# Patient Record
Sex: FEMALE | Race: ASIAN | NOT HISPANIC OR LATINO | URBAN - METROPOLITAN AREA
[De-identification: names, ages, dates, MRNs, and addresses within clinical notes are randomized per-mention and may not be internally consistent; named-entity substitution may affect disease eponyms.]

---

## 2022-03-09 ENCOUNTER — INPATIENT (INPATIENT)
Facility: HOSPITAL | Age: 67
LOS: 8 days | Discharge: HOME | End: 2022-03-18
Attending: INTERNAL MEDICINE | Admitting: INTERNAL MEDICINE
Payer: MEDICAID

## 2022-03-09 VITALS
DIASTOLIC BLOOD PRESSURE: 51 MMHG | RESPIRATION RATE: 16 BRPM | OXYGEN SATURATION: 100 % | HEART RATE: 61 BPM | TEMPERATURE: 98 F | SYSTOLIC BLOOD PRESSURE: 138 MMHG

## 2022-03-09 DIAGNOSIS — N17.9 ACUTE KIDNEY FAILURE, UNSPECIFIED: ICD-10-CM

## 2022-03-09 LAB
ALBUMIN SERPL ELPH-MCNC: 4.1 G/DL — SIGNIFICANT CHANGE UP (ref 3.3–5)
ALP SERPL-CCNC: 132 U/L — HIGH (ref 40–120)
ALT FLD-CCNC: 93 U/L — HIGH (ref 4–33)
ANION GAP SERPL CALC-SCNC: 16 MMOL/L — HIGH (ref 7–14)
APPEARANCE UR: CLEAR — SIGNIFICANT CHANGE UP
APTT BLD: 28 SEC — SIGNIFICANT CHANGE UP (ref 27–36.3)
AST SERPL-CCNC: 52 U/L — HIGH (ref 4–32)
BACTERIA # UR AUTO: ABNORMAL
BASE EXCESS BLDV CALC-SCNC: -2.5 MMOL/L — LOW (ref -2–3)
BASOPHILS # BLD AUTO: 0.01 K/UL — SIGNIFICANT CHANGE UP (ref 0–0.2)
BASOPHILS NFR BLD AUTO: 0.2 % — SIGNIFICANT CHANGE UP (ref 0–2)
BILIRUB SERPL-MCNC: 0.3 MG/DL — SIGNIFICANT CHANGE UP (ref 0.2–1.2)
BILIRUB UR-MCNC: NEGATIVE — SIGNIFICANT CHANGE UP
BLD GP AB SCN SERPL QL: NEGATIVE — SIGNIFICANT CHANGE UP
BLOOD GAS VENOUS COMPREHENSIVE RESULT: SIGNIFICANT CHANGE UP
BUN SERPL-MCNC: 79 MG/DL — HIGH (ref 7–23)
CALCIUM SERPL-MCNC: 8.5 MG/DL — SIGNIFICANT CHANGE UP (ref 8.4–10.5)
CHLORIDE BLDV-SCNC: 105 MMOL/L — SIGNIFICANT CHANGE UP (ref 96–108)
CHLORIDE SERPL-SCNC: 104 MMOL/L — SIGNIFICANT CHANGE UP (ref 98–107)
CO2 BLDV-SCNC: 24.5 MMOL/L — SIGNIFICANT CHANGE UP (ref 22–26)
CO2 SERPL-SCNC: 20 MMOL/L — LOW (ref 22–31)
COLOR SPEC: SIGNIFICANT CHANGE UP
CREAT SERPL-MCNC: 3.82 MG/DL — HIGH (ref 0.5–1.3)
DIFF PNL FLD: NEGATIVE — SIGNIFICANT CHANGE UP
EGFR: 12 ML/MIN/1.73M2 — LOW
EOSINOPHIL # BLD AUTO: 0.05 K/UL — SIGNIFICANT CHANGE UP (ref 0–0.5)
EOSINOPHIL NFR BLD AUTO: 0.8 % — SIGNIFICANT CHANGE UP (ref 0–6)
EPI CELLS # UR: 1 /HPF — SIGNIFICANT CHANGE UP (ref 0–5)
GAS PNL BLDV: 139 MMOL/L — SIGNIFICANT CHANGE UP (ref 136–145)
GLUCOSE BLDC GLUCOMTR-MCNC: 120 MG/DL — HIGH (ref 70–99)
GLUCOSE BLDV-MCNC: 143 MG/DL — HIGH (ref 70–99)
GLUCOSE SERPL-MCNC: 151 MG/DL — HIGH (ref 70–99)
GLUCOSE UR QL: NEGATIVE — SIGNIFICANT CHANGE UP
HCO3 BLDV-SCNC: 23 MMOL/L — SIGNIFICANT CHANGE UP (ref 22–29)
HCT VFR BLD CALC: 28.5 % — LOW (ref 34.5–45)
HCT VFR BLDA CALC: 27 % — LOW (ref 34.5–46.5)
HGB BLD CALC-MCNC: 9 G/DL — LOW (ref 11.5–15.5)
HGB BLD-MCNC: 8.7 G/DL — LOW (ref 11.5–15.5)
HYALINE CASTS # UR AUTO: 0 /LPF — SIGNIFICANT CHANGE UP (ref 0–7)
IANC: 5.33 K/UL — SIGNIFICANT CHANGE UP (ref 1.5–8.5)
IMM GRANULOCYTES NFR BLD AUTO: 1.4 % — SIGNIFICANT CHANGE UP (ref 0–1.5)
INR BLD: 1.21 RATIO — HIGH (ref 0.88–1.16)
KETONES UR-MCNC: NEGATIVE — SIGNIFICANT CHANGE UP
LACTATE BLDV-MCNC: 1 MMOL/L — SIGNIFICANT CHANGE UP (ref 0.5–2)
LEUKOCYTE ESTERASE UR-ACNC: ABNORMAL
LYMPHOCYTES # BLD AUTO: 0.8 K/UL — LOW (ref 1–3.3)
LYMPHOCYTES # BLD AUTO: 12 % — LOW (ref 13–44)
MAGNESIUM SERPL-MCNC: 3.8 MG/DL — HIGH (ref 1.6–2.6)
MCHC RBC-ENTMCNC: 28.4 PG — SIGNIFICANT CHANGE UP (ref 27–34)
MCHC RBC-ENTMCNC: 30.5 GM/DL — LOW (ref 32–36)
MCV RBC AUTO: 93.1 FL — SIGNIFICANT CHANGE UP (ref 80–100)
MONOCYTES # BLD AUTO: 0.37 K/UL — SIGNIFICANT CHANGE UP (ref 0–0.9)
MONOCYTES NFR BLD AUTO: 5.6 % — SIGNIFICANT CHANGE UP (ref 2–14)
NEUTROPHILS # BLD AUTO: 5.33 K/UL — SIGNIFICANT CHANGE UP (ref 1.8–7.4)
NEUTROPHILS NFR BLD AUTO: 80 % — HIGH (ref 43–77)
NITRITE UR-MCNC: NEGATIVE — SIGNIFICANT CHANGE UP
NRBC # BLD: 0 /100 WBCS — SIGNIFICANT CHANGE UP
NRBC # FLD: 0 K/UL — SIGNIFICANT CHANGE UP
PCO2 BLDV: 43 MMHG — HIGH (ref 39–42)
PH BLDV: 7.34 — SIGNIFICANT CHANGE UP (ref 7.32–7.43)
PH UR: 6.5 — SIGNIFICANT CHANGE UP (ref 5–8)
PHOSPHATE SERPL-MCNC: 6.2 MG/DL — HIGH (ref 2.5–4.5)
PLATELET # BLD AUTO: 187 K/UL — SIGNIFICANT CHANGE UP (ref 150–400)
PO2 BLDV: 36 MMHG — SIGNIFICANT CHANGE UP
POTASSIUM BLDV-SCNC: 5 MMOL/L — SIGNIFICANT CHANGE UP (ref 3.5–5.1)
POTASSIUM SERPL-MCNC: 5 MMOL/L — SIGNIFICANT CHANGE UP (ref 3.5–5.3)
POTASSIUM SERPL-SCNC: 5 MMOL/L — SIGNIFICANT CHANGE UP (ref 3.5–5.3)
PROT SERPL-MCNC: 8.1 G/DL — SIGNIFICANT CHANGE UP (ref 6–8.3)
PROT UR-MCNC: ABNORMAL
PROTHROM AB SERPL-ACNC: 14.1 SEC — HIGH (ref 10.5–13.4)
RBC # BLD: 3.06 M/UL — LOW (ref 3.8–5.2)
RBC # FLD: 14.1 % — SIGNIFICANT CHANGE UP (ref 10.3–14.5)
RBC CASTS # UR COMP ASSIST: 1 /HPF — SIGNIFICANT CHANGE UP (ref 0–4)
RH IG SCN BLD-IMP: POSITIVE — SIGNIFICANT CHANGE UP
SAO2 % BLDV: 56.2 % — SIGNIFICANT CHANGE UP
SARS-COV-2 RNA SPEC QL NAA+PROBE: SIGNIFICANT CHANGE UP
SODIUM SERPL-SCNC: 140 MMOL/L — SIGNIFICANT CHANGE UP (ref 135–145)
SP GR SPEC: 1.01 — SIGNIFICANT CHANGE UP (ref 1–1.05)
UROBILINOGEN FLD QL: SIGNIFICANT CHANGE UP
WBC # BLD: 6.65 K/UL — SIGNIFICANT CHANGE UP (ref 3.8–10.5)
WBC # FLD AUTO: 6.65 K/UL — SIGNIFICANT CHANGE UP (ref 3.8–10.5)
WBC UR QL: 5 /HPF — SIGNIFICANT CHANGE UP (ref 0–5)

## 2022-03-09 PROCEDURE — 99285 EMERGENCY DEPT VISIT HI MDM: CPT

## 2022-03-09 PROCEDURE — 76770 US EXAM ABDO BACK WALL COMP: CPT | Mod: 26

## 2022-03-09 PROCEDURE — 71045 X-RAY EXAM CHEST 1 VIEW: CPT | Mod: 26

## 2022-03-09 PROCEDURE — 99223 1ST HOSP IP/OBS HIGH 75: CPT

## 2022-03-09 RX ORDER — GLUCAGON INJECTION, SOLUTION 0.5 MG/.1ML
1 INJECTION, SOLUTION SUBCUTANEOUS ONCE
Refills: 0 | Status: DISCONTINUED | OUTPATIENT
Start: 2022-03-09 | End: 2022-03-18

## 2022-03-09 RX ORDER — DEXTROSE 50 % IN WATER 50 %
25 SYRINGE (ML) INTRAVENOUS ONCE
Refills: 0 | Status: DISCONTINUED | OUTPATIENT
Start: 2022-03-09 | End: 2022-03-18

## 2022-03-09 RX ORDER — INSULIN LISPRO 100/ML
VIAL (ML) SUBCUTANEOUS
Refills: 0 | Status: DISCONTINUED | OUTPATIENT
Start: 2022-03-09 | End: 2022-03-18

## 2022-03-09 RX ORDER — ACETAMINOPHEN 500 MG
650 TABLET ORAL EVERY 6 HOURS
Refills: 0 | Status: DISCONTINUED | OUTPATIENT
Start: 2022-03-09 | End: 2022-03-18

## 2022-03-09 RX ORDER — SODIUM CHLORIDE 9 MG/ML
1000 INJECTION, SOLUTION INTRAVENOUS
Refills: 0 | Status: DISCONTINUED | OUTPATIENT
Start: 2022-03-09 | End: 2022-03-18

## 2022-03-09 RX ORDER — SODIUM BICARBONATE 1 MEQ/ML
650 SYRINGE (ML) INTRAVENOUS THREE TIMES A DAY
Refills: 0 | Status: DISCONTINUED | OUTPATIENT
Start: 2022-03-09 | End: 2022-03-18

## 2022-03-09 RX ORDER — FOLIC ACID 0.8 MG
1 TABLET ORAL DAILY
Refills: 0 | Status: DISCONTINUED | OUTPATIENT
Start: 2022-03-09 | End: 2022-03-18

## 2022-03-09 RX ORDER — HEPARIN SODIUM 5000 [USP'U]/ML
5000 INJECTION INTRAVENOUS; SUBCUTANEOUS THREE TIMES A DAY
Refills: 0 | Status: DISCONTINUED | OUTPATIENT
Start: 2022-03-09 | End: 2022-03-17

## 2022-03-09 RX ORDER — ASPIRIN/CALCIUM CARB/MAGNESIUM 324 MG
81 TABLET ORAL DAILY
Refills: 0 | Status: DISCONTINUED | OUTPATIENT
Start: 2022-03-09 | End: 2022-03-18

## 2022-03-09 RX ORDER — HYDRALAZINE HCL 50 MG
10 TABLET ORAL THREE TIMES A DAY
Refills: 0 | Status: DISCONTINUED | OUTPATIENT
Start: 2022-03-09 | End: 2022-03-18

## 2022-03-09 RX ORDER — INSULIN GLARGINE 100 [IU]/ML
10 INJECTION, SOLUTION SUBCUTANEOUS AT BEDTIME
Refills: 0 | Status: DISCONTINUED | OUTPATIENT
Start: 2022-03-09 | End: 2022-03-18

## 2022-03-09 RX ORDER — DEXTROSE 50 % IN WATER 50 %
12.5 SYRINGE (ML) INTRAVENOUS ONCE
Refills: 0 | Status: DISCONTINUED | OUTPATIENT
Start: 2022-03-09 | End: 2022-03-18

## 2022-03-09 RX ORDER — INSULIN LISPRO 100/ML
VIAL (ML) SUBCUTANEOUS AT BEDTIME
Refills: 0 | Status: DISCONTINUED | OUTPATIENT
Start: 2022-03-09 | End: 2022-03-18

## 2022-03-09 RX ORDER — FUROSEMIDE 40 MG
40 TABLET ORAL DAILY
Refills: 0 | Status: DISCONTINUED | OUTPATIENT
Start: 2022-03-09 | End: 2022-03-10

## 2022-03-09 RX ORDER — DEXTROSE 50 % IN WATER 50 %
15 SYRINGE (ML) INTRAVENOUS ONCE
Refills: 0 | Status: DISCONTINUED | OUTPATIENT
Start: 2022-03-09 | End: 2022-03-18

## 2022-03-09 RX ORDER — CARVEDILOL PHOSPHATE 80 MG/1
6.25 CAPSULE, EXTENDED RELEASE ORAL EVERY 12 HOURS
Refills: 0 | Status: DISCONTINUED | OUTPATIENT
Start: 2022-03-09 | End: 2022-03-18

## 2022-03-09 RX ORDER — ATORVASTATIN CALCIUM 80 MG/1
20 TABLET, FILM COATED ORAL AT BEDTIME
Refills: 0 | Status: DISCONTINUED | OUTPATIENT
Start: 2022-03-09 | End: 2022-03-18

## 2022-03-09 RX ORDER — PREGABALIN 225 MG/1
1000 CAPSULE ORAL DAILY
Refills: 0 | Status: DISCONTINUED | OUTPATIENT
Start: 2022-03-09 | End: 2022-03-18

## 2022-03-09 RX ORDER — GABAPENTIN 400 MG/1
300 CAPSULE ORAL DAILY
Refills: 0 | Status: DISCONTINUED | OUTPATIENT
Start: 2022-03-09 | End: 2022-03-18

## 2022-03-09 RX ORDER — LEVOTHYROXINE SODIUM 125 MCG
75 TABLET ORAL DAILY
Refills: 0 | Status: DISCONTINUED | OUTPATIENT
Start: 2022-03-09 | End: 2022-03-18

## 2022-03-09 RX ORDER — NIFEDIPINE 30 MG
90 TABLET, EXTENDED RELEASE 24 HR ORAL DAILY
Refills: 0 | Status: DISCONTINUED | OUTPATIENT
Start: 2022-03-09 | End: 2022-03-18

## 2022-03-09 NOTE — H&P ADULT - NSHPPHYSICALEXAM_GEN_ALL_CORE
Vital Signs Last 24 Hrs  T(C): 36.7 (09 Mar 2022 22:14), Max: 36.7 (09 Mar 2022 16:27)  T(F): 98.1 (09 Mar 2022 22:14), Max: 98.1 (09 Mar 2022 22:14)  HR: 58 (09 Mar 2022 22:14) (57 - 61)  BP: 142/61 (09 Mar 2022 22:14) (138/51 - 156/62)  BP(mean): --  RR: 18 (09 Mar 2022 22:14) (16 - 18)  SpO2: 97% (09 Mar 2022 22:14) (97% - 100%)    PHYSICAL EXAM:  GENERAL: No Acute Distress  EYES: conjunctiva and sclera clear  ENMT: Moist mucous membranes   NECK: Supple  PULMONARY: Clear to auscultation bilaterally  CARDIAC: Regular rate and rhythm; No murmurs, rubs, or gallops  GASTROINTESTINAL: Abdomen soft, Nontender, Nondistended; Bowel sounds normal  EXTREMITIES:   No clubbing, cyanosis, or pedal edema  PSYCH: Normal Affect, Normal Behavior  NEUROLOGY:   - Mental status A&O x 3,  SKIN: No rashes or lesions  MUSCULOSKELETAL: No joint swelling

## 2022-03-09 NOTE — ED ADULT NURSE NOTE - NSIMPLEMENTINTERV_GEN_ALL_ED
Implemented All Fall Risk Interventions:  [place] to call system. Call bell, personal items and telephone within reach. Instruct patient to call for assistance. Room bathroom lighting operational. Non-slip footwear when patient is off stretcher. Physically safe environment: no spills, clutter or unnecessary equipment. Stretcher in lowest position, wheels locked, appropriate side rails in place. Provide visual cue, wrist band, yellow gown, etc. Monitor gait and stability. Monitor for mental status changes and reorient to person, place, and time. Review medications for side effects contributing to fall risk. Reinforce activity limits and safety measures with patient and family.

## 2022-03-09 NOTE — H&P ADULT - PROBLEM SELECTOR PLAN 1
hyperkalemia resolved  - trend creatinine  - reviewed renal US  - f/u nephrology recs  - will check urine protein/cr ratio to eval for nephrotic syndrome as cause of facial swelling  - continue bicarb  - hold lasix for now

## 2022-03-09 NOTE — ED ADULT NURSE NOTE - OBJECTIVE STATEMENT
Pt A+OX3, Faroese speaking, requesting daughter to translate, told by MD today to go to the ER for elevated potassium level that was drawn this past Friday.  C/O intermittent CP and SOB.  Also sent for NISH.  EKG done.  CM placed.  Labs obtained and sent as ordered.  #18g SL R arm placed.  PA at bedside.

## 2022-03-09 NOTE — H&P ADULT - PROBLEM SELECTOR PLAN 2
Burning, non-exertional.  Appears c/w GERD, low suspicion for angina  - will give trial of Protonix, Maalox

## 2022-03-09 NOTE — ED ADULT TRIAGE NOTE - CHIEF COMPLAINT QUOTE
pt primarily Khmer speaking (daughter translating) sent in by PCP for elevated potassium and NISH. Pt endorsing SOB and epigastric pain. Denies fever, urinary symptoms, CP. PMH HTN, DM. pt primarily Telugu speaking (daughter translating) sent in by PCP for elevated potassium and NISH. Pt endorsing SOB and epigastric pain. Denies fever, urinary symptoms, CP. PMH HTN, DM. fs glu 147

## 2022-03-09 NOTE — ED PROVIDER NOTE - CLINICAL SUMMARY MEDICAL DECISION MAKING FREE TEXT BOX
65 y/o female sent from nephro for worsening CKD and hyperkalemia  -concern for worsening CKD/NISH and hyperkalemia  -cbc cmp vbg pre-ops  -cxr  -[facility]

## 2022-03-09 NOTE — ED PROVIDER NOTE - OBJECTIVE STATEMENT
65 y/o female hx HLD HTN CKD presents to ER sent by PMD/nephro for worsening CKD and hyperkalemia. Pt. saw her nephro - Dr. Mcghee last week for check up - was called today and told that she has elevated cr. and K+ and told to come to ER for futher evaluation. Pt. c/o some mild chest pressure and sob x last few weeks and some mild weakness as well. Pt. dneies fever chills abdominal pain numbness tingling loc. 67 y/o female hx HLD HTN CKD presents to ER sent by PMD/nephro for worsening CKD and hyperkalemia. Pt. saw her nephro - Dr. Mcghee last week for check up - was called today and told that she has elevated cr. and K+ and told to come to ER for further evaluation. Pt. c/o some mild chest pressure and sob x last few weeks and some mild weakness as well. Pt. dneies fever chills abdominal pain numbness tingling loc.

## 2022-03-09 NOTE — ED PROVIDER NOTE - PROGRESS NOTE DETAILS
Patient signed out to me - no hydro on US. US comments on gallbladder wall edema. Pt denies any abdominal pain, nausea. No recent f/c. On exam no ruq tenderness, Damico negative. No need for further imaging at this time. Wall edema not clinically significant. [facility] hospitalist paged for admission. eBbeto Eldridge, AYESHA PGY3

## 2022-03-09 NOTE — H&P ADULT - NSHPLABSRESULTS_GEN_ALL_CORE
140  |  104  |  79<H>  ----------------------------<  151<H>  5.0   |  20<L>  |  3.82<H>    Ca    8.5      09 Mar 2022 17:45  Phos  6.2       Mg     3.80         TPro  8.1  /  Alb  4.1  /  TBili  0.3  /  DBili  x   /  AST  52<H>  /  ALT  93<H>  /  AlkPhos  132<H>                              8.7    6.65  )-----------( 187      ( 09 Mar 2022 17:45 )             28.5               PT/INR - ( 09 Mar 2022 17:45 )   PT: 14.1 sec;   INR: 1.21 ratio         PTT - ( 09 Mar 2022 17:45 )  PTT:28.0 sec    Urinalysis Basic - ( 09 Mar 2022 18:16 )    Color: Light Yellow / Appearance: Clear / S.010 / pH: x  Gluc: x / Ketone: Negative  / Bili: Negative / Urobili: <2 mg/dL   Blood: x / Protein: 100 mg/dL / Nitrite: Negative   Leuk Esterase: Small / RBC: 1 /HPF / WBC 5 /HPF   Sq Epi: x / Non Sq Epi: 1 /HPF / Bacteria: Moderate    < from: US Kidney and Bladder (22 @ 19:03) >    No hydronephrosis.    Gallbladder wall edema.    < end of copied text >    EKG tracing reviewed: AMANDA

## 2022-03-09 NOTE — H&P ADULT - ASSESSMENT
67 y/o F with HTN, HLD, DM, CKD, hypothyroidism presents from nephrology office for worsening creatinine and hyperkalemia on labs.

## 2022-03-09 NOTE — ED PROVIDER NOTE - ATTENDING CONTRIBUTION TO CARE
Agree with above- pt with worsening creatinine, sent in by nephro for eval and admission. Pt overall well appearing, plan for labs and renal US.

## 2022-03-09 NOTE — H&P ADULT - HISTORY OF PRESENT ILLNESS
67 y/o F with HTN, HLD, DM, CKD, hypothyroidism presents from nephrology office for worsening creatinine and hyperkalemia on labs.  Pt and daughter report that pt has had facial swelling, and dyspnea for the past few days.  Pt also has had mild burning midline chest pain for a few days that is non exertional.   She also reports some upper back pain that is not related to chest pain.  Pt reports no palpitations, leg swelling, fever, chills cough or other new symptoms.

## 2022-03-09 NOTE — H&P ADULT - NSHPREVIEWOFSYSTEMS_GEN_ALL_CORE
Review of Systems:   CONSTITUTIONAL: No fever or chills  EYES: No eye pain, visual disturbances, or discharge  ENMT:  No difficulty hearing, no throat pain  NECK: No pain or stiffness  RESPIRATORY: No cough, + shortness of breath  CARDIOVASCULAR: mild chest pain, no palpitations, dizziness, or leg swelling  GASTROINTESTINAL: No abdominal pain, nausea, vomiting or diarrhea  GENITOURINARY: No dysuria, or hematuria  NEUROLOGICAL: No headaches, weakness, or numbness  SKIN: No rashes, or lesions   LYMPH NODES: No enlarged glands  ENDOCRINE: No heat or cold intolerance  MUSCULOSKELETAL: No joint pain or swelling  PSYCHIATRIC: No depression or anxiety  HEME/LYMPH: No easy bruising, or bleeding  ALLERGY AND IMMUNOLOGIC: No hives or eczema

## 2022-03-09 NOTE — ED ADULT NURSE NOTE - CHIEF COMPLAINT QUOTE
pt primarily Icelandic speaking (daughter translating) sent in by PCP for elevated potassium and NISH. Pt endorsing SOB and epigastric pain. Denies fever, urinary symptoms, CP. PMH HTN, DM. fs glu 147

## 2022-03-10 DIAGNOSIS — E11.9 TYPE 2 DIABETES MELLITUS WITHOUT COMPLICATIONS: ICD-10-CM

## 2022-03-10 DIAGNOSIS — E03.9 HYPOTHYROIDISM, UNSPECIFIED: ICD-10-CM

## 2022-03-10 DIAGNOSIS — N17.9 ACUTE KIDNEY FAILURE, UNSPECIFIED: ICD-10-CM

## 2022-03-10 DIAGNOSIS — I10 ESSENTIAL (PRIMARY) HYPERTENSION: ICD-10-CM

## 2022-03-10 DIAGNOSIS — R07.9 CHEST PAIN, UNSPECIFIED: ICD-10-CM

## 2022-03-10 LAB
ANION GAP SERPL CALC-SCNC: 15 MMOL/L — HIGH (ref 7–14)
BUN SERPL-MCNC: 76 MG/DL — HIGH (ref 7–23)
CALCIUM SERPL-MCNC: 7.9 MG/DL — LOW (ref 8.4–10.5)
CHLORIDE SERPL-SCNC: 103 MMOL/L — SIGNIFICANT CHANGE UP (ref 98–107)
CO2 SERPL-SCNC: 20 MMOL/L — LOW (ref 22–31)
CREAT SERPL-MCNC: 3.76 MG/DL — HIGH (ref 0.5–1.3)
EGFR: 13 ML/MIN/1.73M2 — LOW
GLUCOSE BLDC GLUCOMTR-MCNC: 102 MG/DL — HIGH (ref 70–99)
GLUCOSE BLDC GLUCOMTR-MCNC: 139 MG/DL — HIGH (ref 70–99)
GLUCOSE BLDC GLUCOMTR-MCNC: 148 MG/DL — HIGH (ref 70–99)
GLUCOSE BLDC GLUCOMTR-MCNC: 163 MG/DL — HIGH (ref 70–99)
GLUCOSE BLDC GLUCOMTR-MCNC: 179 MG/DL — HIGH (ref 70–99)
GLUCOSE BLDC GLUCOMTR-MCNC: 79 MG/DL — SIGNIFICANT CHANGE UP (ref 70–99)
GLUCOSE SERPL-MCNC: 160 MG/DL — HIGH (ref 70–99)
MAGNESIUM SERPL-MCNC: 3.5 MG/DL — HIGH (ref 1.6–2.6)
PHOSPHATE SERPL-MCNC: 6.3 MG/DL — HIGH (ref 2.5–4.5)
POTASSIUM SERPL-MCNC: 4.7 MMOL/L — SIGNIFICANT CHANGE UP (ref 3.5–5.3)
POTASSIUM SERPL-SCNC: 4.7 MMOL/L — SIGNIFICANT CHANGE UP (ref 3.5–5.3)
SODIUM SERPL-SCNC: 138 MMOL/L — SIGNIFICANT CHANGE UP (ref 135–145)

## 2022-03-10 RX ORDER — CALCIUM ACETATE 667 MG
667 TABLET ORAL
Refills: 0 | Status: DISCONTINUED | OUTPATIENT
Start: 2022-03-10 | End: 2022-03-15

## 2022-03-10 RX ORDER — PANTOPRAZOLE SODIUM 20 MG/1
40 TABLET, DELAYED RELEASE ORAL
Refills: 0 | Status: DISCONTINUED | OUTPATIENT
Start: 2022-03-10 | End: 2022-03-18

## 2022-03-10 RX ORDER — INSULIN GLARGINE 100 [IU]/ML
10 INJECTION, SOLUTION SUBCUTANEOUS ONCE
Refills: 0 | Status: COMPLETED | OUTPATIENT
Start: 2022-03-10 | End: 2022-03-10

## 2022-03-10 RX ADMIN — ATORVASTATIN CALCIUM 20 MILLIGRAM(S): 80 TABLET, FILM COATED ORAL at 21:29

## 2022-03-10 RX ADMIN — Medication 1 MILLIGRAM(S): at 11:51

## 2022-03-10 RX ADMIN — Medication 667 MILLIGRAM(S): at 16:53

## 2022-03-10 RX ADMIN — Medication 1: at 17:49

## 2022-03-10 RX ADMIN — Medication 81 MILLIGRAM(S): at 11:51

## 2022-03-10 RX ADMIN — GABAPENTIN 300 MILLIGRAM(S): 400 CAPSULE ORAL at 11:51

## 2022-03-10 RX ADMIN — HEPARIN SODIUM 5000 UNIT(S): 5000 INJECTION INTRAVENOUS; SUBCUTANEOUS at 05:25

## 2022-03-10 RX ADMIN — PREGABALIN 1000 MICROGRAM(S): 225 CAPSULE ORAL at 11:52

## 2022-03-10 RX ADMIN — Medication 650 MILLIGRAM(S): at 16:17

## 2022-03-10 RX ADMIN — HEPARIN SODIUM 5000 UNIT(S): 5000 INJECTION INTRAVENOUS; SUBCUTANEOUS at 11:52

## 2022-03-10 RX ADMIN — Medication 90 MILLIGRAM(S): at 08:50

## 2022-03-10 RX ADMIN — HEPARIN SODIUM 5000 UNIT(S): 5000 INJECTION INTRAVENOUS; SUBCUTANEOUS at 21:29

## 2022-03-10 RX ADMIN — INSULIN GLARGINE 10 UNIT(S): 100 INJECTION, SOLUTION SUBCUTANEOUS at 22:37

## 2022-03-10 RX ADMIN — Medication 75 MICROGRAM(S): at 05:22

## 2022-03-10 RX ADMIN — Medication 650 MILLIGRAM(S): at 05:22

## 2022-03-10 RX ADMIN — Medication 10 MILLIGRAM(S): at 21:29

## 2022-03-10 RX ADMIN — Medication 650 MILLIGRAM(S): at 21:29

## 2022-03-10 RX ADMIN — Medication 10 MILLIGRAM(S): at 11:52

## 2022-03-10 RX ADMIN — Medication 10 MILLIGRAM(S): at 05:22

## 2022-03-10 RX ADMIN — PANTOPRAZOLE SODIUM 40 MILLIGRAM(S): 20 TABLET, DELAYED RELEASE ORAL at 05:22

## 2022-03-10 RX ADMIN — INSULIN GLARGINE 10 UNIT(S): 100 INJECTION, SOLUTION SUBCUTANEOUS at 04:09

## 2022-03-10 NOTE — PATIENT PROFILE ADULT - FALL HARM RISK - RISK INTERVENTIONS

## 2022-03-10 NOTE — PROGRESS NOTE ADULT - SUBJECTIVE AND OBJECTIVE BOX
Sitting on chair  Breathing comfortably    Vital Signs Last 24 Hrs  T(C): 36.7 (10 Mar 2022 08:45), Max: 36.7 (09 Mar 2022 16:27)  T(F): 98 (10 Mar 2022 08:45), Max: 98.1 (09 Mar 2022 22:14)  HR: 62 (10 Mar 2022 08:45) (57 - 62)  BP: 138/71 (10 Mar 2022 08:45) (131/65 - 156/62)  BP(mean): --  RR: 18 (10 Mar 2022 08:45) (16 - 18)  SpO2: 97% (10 Mar 2022 08:45) (96% - 100%)    I&O's Summary    Gen: NAD  Head: NCAT, EOMI  Lungs: rales rt base > lt base; no wheezes  Heart: RRR, nl S1/S2, no murmurs  Abd: soft, NTND, NABS  Neuro: A+O x 3  Exts: no LE edema b/l    LABS:                        8.7    6.65  )-----------( 187      ( 09 Mar 2022 17:45 )             28.5     03-09    140  |  104  |  79<H>  ----------------------------<  151<H>  5.0   |  20<L>  |  3.82<H>    Ca    8.5      09 Mar 2022 17:45  Phos  6.2     03-09  Mg     3.80     03-09    TPro  8.1  /  Alb  4.1  /  TBili  0.3  /  DBili  x   /  AST  52<H>  /  ALT  93<H>  /  AlkPhos  132<H>  03-09    PT/INR - ( 09 Mar 2022 17:45 )   PT: 14.1 sec;   INR: 1.21 ratio         PTT - ( 09 Mar 2022 17:45 )  PTT:28.0 sec  CAPILLARY BLOOD GLUCOSE      POCT Blood Glucose.: 102 mg/dL (10 Mar 2022 08:28)  POCT Blood Glucose.: 148 mg/dL (10 Mar 2022 03:31)  POCT Blood Glucose.: 79 mg/dL (10 Mar 2022 02:43)  POCT Blood Glucose.: 120 mg/dL (09 Mar 2022 20:57)  POCT Blood Glucose.: 147 mg/dL (09 Mar 2022 16:53)        Urinalysis Basic - ( 09 Mar 2022 18:16 )    Color: Light Yellow / Appearance: Clear / S.010 / pH: x  Gluc: x / Ketone: Negative  / Bili: Negative / Urobili: <2 mg/dL   Blood: x / Protein: 100 mg/dL / Nitrite: Negative   Leuk Esterase: Small / RBC: 1 /HPF / WBC 5 /HPF   Sq Epi: x / Non Sq Epi: 1 /HPF / Bacteria: Moderate        RADIOLOGY & ADDITIONAL TESTS:    Imaging Personally Reviewed:  [x] YES  [ ] NO    Will obtain old records:  [ ] YES  [x] NO

## 2022-03-10 NOTE — CONSULT NOTE ADULT - SUBJECTIVE AND OBJECTIVE BOX
Gary Morrison MD  Interventional Cardiology / Endovascular Specialist  [place] Office : [address] [place] [address] [place] [address]  Tel:   [place] Office : [address] [facility] [place] [address]  Tel: [phone]      HISTORY OF PRESENTING ILLNESS:   ID # [id number]  65 y/o F with HTN, HLD, DM, CKD, hypothyroidism presents from nephrology office for worsening creatinine and hyperkalemia on labs.  Pt and daughter report that pt has had facial swelling, and dyspnea for the past few days.  Pt also has had mild burning midline chest pain for a few days that is non exertional. She also reports some upper back pain that is not related to chest pain.  Pt reports no palpitations, leg swelling, fever, chills cough or other new symptoms. Currently no CP. SOB improving.   	  MEDICATIONS:  aspirin enteric coated 81 milliGRAM(s) Oral daily  carvedilol 6.25 milliGRAM(s) Oral every 12 hours  heparin   Injectable 5000 Unit(s) SubCutaneous three times a day  hydrALAZINE 10 milliGRAM(s) Oral three times a day  NIFEdipine XL 90 milliGRAM(s) Oral daily        acetaminophen     Tablet .. 650 milliGRAM(s) Oral every 6 hours PRN  gabapentin 300 milliGRAM(s) Oral daily    pantoprazole    Tablet 40 milliGRAM(s) Oral before breakfast    atorvastatin 20 milliGRAM(s) Oral at bedtime  dextrose 40% Gel 15 Gram(s) Oral once  dextrose 50% Injectable 25 Gram(s) IV Push once  dextrose 50% Injectable 12.5 Gram(s) IV Push once  dextrose 50% Injectable 25 Gram(s) IV Push once  glucagon  Injectable 1 milliGRAM(s) IntraMuscular once  insulin glargine Injectable (LANTUS) 10 Unit(s) SubCutaneous at bedtime  insulin lispro (ADMELOG) corrective regimen sliding scale   SubCutaneous three times a day before meals  insulin lispro (ADMELOG) corrective regimen sliding scale   SubCutaneous at bedtime  levothyroxine 75 MICROGram(s) Oral daily    calcium acetate 667 milliGRAM(s) Oral three times a day with meals  cyanocobalamin 1000 MICROGram(s) Oral daily  dextrose 5%. 1000 milliLiter(s) IV Continuous <Continuous>  dextrose 5%. 1000 milliLiter(s) IV Continuous <Continuous>  folic acid 1 milliGRAM(s) Oral daily  sodium bicarbonate 650 milliGRAM(s) Oral three times a day      PAST MEDICAL/SURGICAL HISTORY  PAST MEDICAL & SURGICAL HISTORY:  HTN (hypertension)    HLD (hyperlipidemia)        SOCIAL HISTORY: Substance Use (street drugs): ( x ) never used  (  ) other:    FAMILY HISTORY:  No pertinent family history in first degree relatives        REVIEW OF SYSTEMS:  CONSTITUTIONAL: No fever, weight loss, or fatigue  EYES: No eye pain, visual disturbances, or discharge  ENMT:  No difficulty hearing, tinnitus, vertigo; No sinus or throat pain  BREASTS: No pain, masses, or nipple discharge  GASTROINTESTINAL: No abdominal or epigastric pain. No nausea, vomiting, or hematemesis; No diarrhea or constipation. No melena or hematochezia.  GENITOURINARY: No dysuria, frequency, hematuria, or incontinence  NEUROLOGICAL: No headaches, memory loss, loss of strength, numbness, or tremors  ENDOCRINE: No heat or cold intolerance; No hair loss  MUSCULOSKELETAL: No joint pain or swelling; No muscle, back, or extremity pain  PSYCHIATRIC: No depression, anxiety, mood swings, or difficulty sleeping  HEME/LYMPH: No easy bruising, or bleeding gums  All others negative    PHYSICAL EXAM:  T(C): 36.7 (03-10-22 @ 11:50), Max: 36.7 (03-09-22 @ 16:27)  HR: 70 (03-10-22 @ 11:50) (57 - 70)  BP: 128/65 (03-10-22 @ 11:50) (128/65 - 156/62)  RR: 18 (03-10-22 @ 11:50) (16 - 18)  SpO2: 97% (03-10-22 @ 11:50) (96% - 100%)  Wt(kg): --  I&O's Summary        GENERAL: NAD  EYES: EOMI, PERRLA, conjunctiva and sclera clear  ENMT: No tonsillar erythema, exudates, or enlargement  Cardiovascular: Normal S1 S2, No JVD, No murmurs, No edema  Respiratory: Lungs clear to auscultation	  Gastrointestinal:  Soft, Non-tender, + BS	  Extremities: mild LE edema                                       8.7    6.65  )-----------( 187      ( 09 Mar 2022 17:45 )             28.5     03-09    140  |  104  |  79<H>  ----------------------------<  151<H>  5.0   |  20<L>  |  3.82<H>    Ca    8.5      09 Mar 2022 17:45  Phos  6.2     03-09  Mg     3.80     03-09    TPro  8.1  /  Alb  4.1  /  TBili  0.3  /  DBili  x   /  AST  52<H>  /  ALT  93<H>  /  AlkPhos  132<H>  03-09    proBNP:   Lipid Profile:   HgA1c:   TSH:     Consultant(s) Notes Reviewed:  [x ] YES  [ ] NO    Care Discussed with Consultants/Other Providers [ x] YES  [ ] NO    Imaging Personally Reviewed independently:  [x] YES  [ ] NO    All labs, radiologic studies, vitals, orders and medications list reviewed. Patient is seen and examined at bedside. Case discussed with medical team.              
[facility]   MD ANA KAPADIA MD RUORU WONG, PA    TEL:  FROM 9 AM to 5 PM--OFFICE: [phone]    FROM 5 PM- 9 AM PLEASE CALL ANSWERING SERVICE AT [phone]    -- INITIAL RENAL CONSULT NOTE --- Date Of service 03-10-22 @ 12:38  --------------------------------------------------------------------------------  HPI:    65 y/o F with HTN, HLD, DM, CKD, hypothyroidism presents from nephrology office for worsening creatinine and hyperkalemia on labs.  Pt and daughter report that pt has had facial swelling, and dyspnea for the past few days.  Pt also has had mild burning midline chest pain for a few days that is non exertional.          PAST HISTORY  --------------------------------------------------------------------------------  PAST MEDICAL & SURGICAL HISTORY:  HTN (hypertension)    HLD (hyperlipidemia)      FAMILY HISTORY:  No pertinent family history in first degree relatives      PAST SOCIAL HISTORY:    ALLERGIES & MEDICATIONS  --------------------------------------------------------------------------------  Allergies    No Known Allergies    Intolerances      Standing Inpatient Medications  aspirin enteric coated 81 milliGRAM(s) Oral daily  atorvastatin 20 milliGRAM(s) Oral at bedtime  carvedilol 6.25 milliGRAM(s) Oral every 12 hours  cyanocobalamin 1000 MICROGram(s) Oral daily  dextrose 40% Gel 15 Gram(s) Oral once  dextrose 5%. 1000 milliLiter(s) IV Continuous <Continuous>  dextrose 5%. 1000 milliLiter(s) IV Continuous <Continuous>  dextrose 50% Injectable 25 Gram(s) IV Push once  dextrose 50% Injectable 12.5 Gram(s) IV Push once  dextrose 50% Injectable 25 Gram(s) IV Push once  folic acid 1 milliGRAM(s) Oral daily  gabapentin 300 milliGRAM(s) Oral daily  glucagon  Injectable 1 milliGRAM(s) IntraMuscular once  heparin   Injectable 5000 Unit(s) SubCutaneous three times a day  hydrALAZINE 10 milliGRAM(s) Oral three times a day  insulin glargine Injectable (LANTUS) 10 Unit(s) SubCutaneous at bedtime  insulin lispro (ADMELOG) corrective regimen sliding scale   SubCutaneous three times a day before meals  insulin lispro (ADMELOG) corrective regimen sliding scale   SubCutaneous at bedtime  levothyroxine 75 MICROGram(s) Oral daily  NIFEdipine XL 90 milliGRAM(s) Oral daily  pantoprazole    Tablet 40 milliGRAM(s) Oral before breakfast  sodium bicarbonate 650 milliGRAM(s) Oral three times a day    PRN Inpatient Medications  acetaminophen     Tablet .. 650 milliGRAM(s) Oral every 6 hours PRN      REVIEW OF SYSTEMS  --------------------------------------------------------------------------------  Gen: No fevers/chills  Skin: No rashes  Head/Eyes/Ears: Normal hearing,  Normal vision   Respiratory: + dyspnea, cough  CV: No chest pain  GI: No abdominal pain, diarrhea, constipation, nausea, vomiting  : No dysuria, hematuria  MSK: + edema  Heme: No easy bruising or bleeding  Psych: No significant depression    All other systems were reviewed and are negative, except as noted.    VITALS/PHYSICAL EXAM  --------------------------------------------------------------------------------  T(C): 36.7 (03-10-22 @ 11:50), Max: 36.7 (03-09-22 @ 16:27)  HR: 70 (03-10-22 @ 11:50) (57 - 70)  BP: 128/65 (03-10-22 @ 11:50) (128/65 - 156/62)  RR: 18 (03-10-22 @ 11:50) (16 - 18)  SpO2: 97% (03-10-22 @ 11:50) (96% - 100%)  Wt(kg): --        Physical Exam:  	Gen: NAD  	HEENT: MMM  	Pulm: Crackles  B/L  	CV: S1S2  	Abd: Soft, +BS   	Ext: + LE edema B/L  	Neuro: Awake, alert  	Skin: Warm and dry  	Vascular access: No HD catheter           : no  sharath  LABS/STUDIES  --------------------------------------------------------------------------------              8.7    6.65  >-----------<  187      [03-09-22 @ 17:45]              28.5     140  |  104  |  79  ----------------------------<  151      [03-09-22 @ 17:45]  5.0   |  20  |  3.82        Ca     8.5     [03-09-22 @ 17:45]      Mg     3.80     [03-09-22 @ 17:45]      Phos  6.2     [03-09-22 @ 17:45]    TPro  8.1  /  Alb  4.1  /  TBili  0.3  /  DBili  x   /  AST  52  /  ALT  93  /  AlkPhos  132  [03-09-22 @ 17:45]    PT/INR: PT 14.1 , INR 1.21       [03-09-22 @ 17:45]  PTT: 28.0       [03-09-22 @ 17:45]      Creatinine Trend:  SCr 3.82 [03-09 @ 17:45]    Urinalysis - [03-09-22 @ 18:16]      Color Light Yellow / Appearance Clear / SG 1.010 / pH 6.5      Gluc Negative / Ketone Negative  / Bili Negative / Urobili <2 mg/dL       Blood Negative / Protein 100 mg/dL / Leuk Est Small / Nitrite Negative      RBC 1 / WBC 5 / Hyaline 0 / Gran  / Sq Epi  / Non Sq Epi 1 / Bacteria Moderate

## 2022-03-10 NOTE — CONSULT NOTE ADULT - ASSESSMENT
65 y/o F with HTN, HLD, DM, CKD, hypothyroidism presents from nephrology office for worsening creatinine and hyperkalemia on labs.  Pt and daughter report that pt has had facial swelling, and dyspnea for the past few days.  Pt also has had mild burning midline chest pain for a few days that is non exertional.      NISH on CKD  NISH possibly cardio renal   Diuretics per cardiology  MOnitor BMP   CHeck URien lytes   Avoid nephrotoxics, NSIADS RCA      FLuid overload  likely CHF  Cardiology evaluation   MOnitor daily weights     Acidosis  on oral bicarb  monitor serum co2    Hyperphosphatemia  start binders  low PO4 diet  
65 y/o F with HTN, HLD, DM, CKD, hypothyroidism presents from nephrology office for worsening creatinine and hyperkalemia on labs    EKG: NSR no acute changes    1. SOB  -CXR with hazy RLL opacity  -improving  -likely 2/2 CKD. denies CP  -EKG with no acute changes  -obtain echo    2. CKD  -creat worsening   -hyperkalemia on admission now resolved   -f/u renal recs    3. HTN  -controlled  -c/w coreg, hydralazine and nifedipine  -continue to monitor BP    4. DVT prophylaxis  -hep subq

## 2022-03-11 LAB
A1C WITH ESTIMATED AVERAGE GLUCOSE RESULT: 5.7 % — HIGH (ref 4–5.6)
ANION GAP SERPL CALC-SCNC: 13 MMOL/L — SIGNIFICANT CHANGE UP (ref 7–14)
BUN SERPL-MCNC: 71 MG/DL — HIGH (ref 7–23)
CALCIUM SERPL-MCNC: 7.7 MG/DL — LOW (ref 8.4–10.5)
CHLORIDE SERPL-SCNC: 106 MMOL/L — SIGNIFICANT CHANGE UP (ref 98–107)
CO2 SERPL-SCNC: 21 MMOL/L — LOW (ref 22–31)
CREAT ?TM UR-MCNC: 51 MG/DL — SIGNIFICANT CHANGE UP
CREAT SERPL-MCNC: 3.78 MG/DL — HIGH (ref 0.5–1.3)
EGFR: 12 ML/MIN/1.73M2 — LOW
ESTIMATED AVERAGE GLUCOSE: 117 — SIGNIFICANT CHANGE UP
GLUCOSE BLDC GLUCOMTR-MCNC: 124 MG/DL — HIGH (ref 70–99)
GLUCOSE BLDC GLUCOMTR-MCNC: 146 MG/DL — HIGH (ref 70–99)
GLUCOSE BLDC GLUCOMTR-MCNC: 196 MG/DL — HIGH (ref 70–99)
GLUCOSE BLDC GLUCOMTR-MCNC: 97 MG/DL — SIGNIFICANT CHANGE UP (ref 70–99)
GLUCOSE SERPL-MCNC: 80 MG/DL — SIGNIFICANT CHANGE UP (ref 70–99)
HCT VFR BLD CALC: 26.3 % — LOW (ref 34.5–45)
HGB BLD-MCNC: 8.1 G/DL — LOW (ref 11.5–15.5)
MAGNESIUM SERPL-MCNC: 3.3 MG/DL — HIGH (ref 1.6–2.6)
MCHC RBC-ENTMCNC: 28.7 PG — SIGNIFICANT CHANGE UP (ref 27–34)
MCHC RBC-ENTMCNC: 30.8 GM/DL — LOW (ref 32–36)
MCV RBC AUTO: 93.3 FL — SIGNIFICANT CHANGE UP (ref 80–100)
NRBC # BLD: 0 /100 WBCS — SIGNIFICANT CHANGE UP
NRBC # FLD: 0 K/UL — SIGNIFICANT CHANGE UP
NT-PROBNP SERPL-SCNC: 3164 PG/ML — HIGH
OSMOLALITY UR: 371 MOSM/KG — SIGNIFICANT CHANGE UP (ref 50–1200)
PHOSPHATE SERPL-MCNC: 6.5 MG/DL — HIGH (ref 2.5–4.5)
PLATELET # BLD AUTO: 170 K/UL — SIGNIFICANT CHANGE UP (ref 150–400)
POTASSIUM SERPL-MCNC: 4.6 MMOL/L — SIGNIFICANT CHANGE UP (ref 3.5–5.3)
POTASSIUM SERPL-SCNC: 4.6 MMOL/L — SIGNIFICANT CHANGE UP (ref 3.5–5.3)
POTASSIUM UR-SCNC: 22.6 MMOL/L — SIGNIFICANT CHANGE UP
RBC # BLD: 2.82 M/UL — LOW (ref 3.8–5.2)
RBC # FLD: 14.1 % — SIGNIFICANT CHANGE UP (ref 10.3–14.5)
SODIUM SERPL-SCNC: 140 MMOL/L — SIGNIFICANT CHANGE UP (ref 135–145)
SODIUM UR-SCNC: 76 MMOL/L — SIGNIFICANT CHANGE UP
WBC # BLD: 5.83 K/UL — SIGNIFICANT CHANGE UP (ref 3.8–10.5)
WBC # FLD AUTO: 5.83 K/UL — SIGNIFICANT CHANGE UP (ref 3.8–10.5)

## 2022-03-11 PROCEDURE — 93306 TTE W/DOPPLER COMPLETE: CPT | Mod: 26

## 2022-03-11 RX ORDER — FUROSEMIDE 40 MG
40 TABLET ORAL
Refills: 0 | Status: DISCONTINUED | OUTPATIENT
Start: 2022-03-11 | End: 2022-03-15

## 2022-03-11 RX ADMIN — Medication 1: at 17:11

## 2022-03-11 RX ADMIN — HEPARIN SODIUM 5000 UNIT(S): 5000 INJECTION INTRAVENOUS; SUBCUTANEOUS at 22:25

## 2022-03-11 RX ADMIN — GABAPENTIN 300 MILLIGRAM(S): 400 CAPSULE ORAL at 12:58

## 2022-03-11 RX ADMIN — Medication 667 MILLIGRAM(S): at 10:36

## 2022-03-11 RX ADMIN — PREGABALIN 1000 MICROGRAM(S): 225 CAPSULE ORAL at 12:58

## 2022-03-11 RX ADMIN — Medication 10 MILLIGRAM(S): at 12:58

## 2022-03-11 RX ADMIN — PANTOPRAZOLE SODIUM 40 MILLIGRAM(S): 20 TABLET, DELAYED RELEASE ORAL at 05:12

## 2022-03-11 RX ADMIN — CARVEDILOL PHOSPHATE 6.25 MILLIGRAM(S): 80 CAPSULE, EXTENDED RELEASE ORAL at 17:12

## 2022-03-11 RX ADMIN — Medication 81 MILLIGRAM(S): at 12:58

## 2022-03-11 RX ADMIN — Medication 650 MILLIGRAM(S): at 22:25

## 2022-03-11 RX ADMIN — HEPARIN SODIUM 5000 UNIT(S): 5000 INJECTION INTRAVENOUS; SUBCUTANEOUS at 12:59

## 2022-03-11 RX ADMIN — Medication 10 MILLIGRAM(S): at 22:25

## 2022-03-11 RX ADMIN — CARVEDILOL PHOSPHATE 6.25 MILLIGRAM(S): 80 CAPSULE, EXTENDED RELEASE ORAL at 05:13

## 2022-03-11 RX ADMIN — Medication 90 MILLIGRAM(S): at 05:12

## 2022-03-11 RX ADMIN — INSULIN GLARGINE 10 UNIT(S): 100 INJECTION, SOLUTION SUBCUTANEOUS at 22:25

## 2022-03-11 RX ADMIN — Medication 40 MILLIGRAM(S): at 17:10

## 2022-03-11 RX ADMIN — Medication 667 MILLIGRAM(S): at 17:12

## 2022-03-11 RX ADMIN — Medication 667 MILLIGRAM(S): at 12:58

## 2022-03-11 RX ADMIN — Medication 75 MICROGRAM(S): at 05:13

## 2022-03-11 RX ADMIN — HEPARIN SODIUM 5000 UNIT(S): 5000 INJECTION INTRAVENOUS; SUBCUTANEOUS at 05:15

## 2022-03-11 RX ADMIN — Medication 650 MILLIGRAM(S): at 05:13

## 2022-03-11 RX ADMIN — Medication 650 MILLIGRAM(S): at 12:58

## 2022-03-11 RX ADMIN — Medication 1 MILLIGRAM(S): at 12:58

## 2022-03-11 RX ADMIN — ATORVASTATIN CALCIUM 20 MILLIGRAM(S): 80 TABLET, FILM COATED ORAL at 22:25

## 2022-03-11 RX ADMIN — Medication 10 MILLIGRAM(S): at 05:13

## 2022-03-11 NOTE — PROGRESS NOTE ADULT - SUBJECTIVE AND OBJECTIVE BOX
Sitting on chair  Breathing comfortably  Transport here this morning to take her to [facility]    Vital Signs Last 24 Hrs  T(C): 36.8 (11 Mar 2022 05:00), Max: 37 (10 Mar 2022 21:00)  T(F): 98.3 (11 Mar 2022 05:00), Max: 98.6 (10 Mar 2022 21:00)  HR: 68 (11 Mar 2022 05:00) (58 - 70)  BP: 138/68 (11 Mar 2022 05:00) (125/70 - 138/68)  BP(mean): --  RR: 18 (11 Mar 2022 05:00) (18 - 18)  SpO2: 99% (11 Mar 2022 05:00) (97% - 99%)    I&O's Summary      Gen: NAD  Head: NCAT, EOMI  Lungs: rales rt base > lt base; no wheezes  Heart: RRR, nl S1/S2, no murmurs  Abd: soft, NTND, NABS  Neuro: A+O x 3  Exts: no LE edema b/l    LABS:                        8.1    5.83  )-----------( 170      ( 11 Mar 2022 07:51 )             26.3     03-11    140  |  106  |  71<H>  ----------------------------<  80  4.6   |  21<L>  |  3.78<H>    Ca    7.7<L>      11 Mar 2022 07:51  Phos  6.5     03-11  Mg     3.30     03-11    TPro  8.1  /  Alb  4.1  /  TBili  0.3  /  DBili  x   /  AST  52<H>  /  ALT  93<H>  /  AlkPhos  132<H>  03-09    PT/INR - ( 09 Mar 2022 17:45 )   PT: 14.1 sec;   INR: 1.21 ratio         PTT - ( 09 Mar 2022 17:45 )  PTT:28.0 sec  CAPILLARY BLOOD GLUCOSE      POCT Blood Glucose.: 97 mg/dL (11 Mar 2022 08:28)  POCT Blood Glucose.: 163 mg/dL (10 Mar 2022 22:05)  POCT Blood Glucose.: 179 mg/dL (10 Mar 2022 17:38)  POCT Blood Glucose.: 139 mg/dL (10 Mar 2022 12:21)        Urinalysis Basic - ( 09 Mar 2022 18:16 )    Color: Light Yellow / Appearance: Clear / S.010 / pH: x  Gluc: x / Ketone: Negative  / Bili: Negative / Urobili: <2 mg/dL   Blood: x / Protein: 100 mg/dL / Nitrite: Negative   Leuk Esterase: Small / RBC: 1 /HPF / WBC 5 /HPF   Sq Epi: x / Non Sq Epi: 1 /HPF / Bacteria: Moderate        RADIOLOGY & ADDITIONAL TESTS:    Imaging Personally Reviewed:  [x] YES  [ ] NO    Will obtain old records:  [ ] YES  [x] NO

## 2022-03-11 NOTE — PROGRESS NOTE ADULT - SUBJECTIVE AND OBJECTIVE BOX
[facility]   MD ANA KAPADIA MD RUORU WONG, PA    TEL:  FROM 9 AM to 5 PM ---OFFICE: [phone]    FROM 5 PM - 9 AM PLEASE CALL ANSWERING SERVICE: [phone]    RENAL FOLLOW UP NOTE--Date of Service 03-11-22 @ 12:10  --------------------------------------------------------------------------------  HPI:      Pt seen and examined at bedside.       PAST HISTORY  --------------------------------------------------------------------------------  No significant changes to PMH, PSH, FHx, SHx, unless otherwise noted    ALLERGIES & MEDICATIONS  --------------------------------------------------------------------------------  Allergies    No Known Allergies    Intolerances      Standing Inpatient Medications  aspirin enteric coated 81 milliGRAM(s) Oral daily  atorvastatin 20 milliGRAM(s) Oral at bedtime  calcium acetate 667 milliGRAM(s) Oral three times a day with meals  carvedilol 6.25 milliGRAM(s) Oral every 12 hours  cyanocobalamin 1000 MICROGram(s) Oral daily  dextrose 40% Gel 15 Gram(s) Oral once  dextrose 5%. 1000 milliLiter(s) IV Continuous <Continuous>  dextrose 5%. 1000 milliLiter(s) IV Continuous <Continuous>  dextrose 50% Injectable 25 Gram(s) IV Push once  dextrose 50% Injectable 12.5 Gram(s) IV Push once  dextrose 50% Injectable 25 Gram(s) IV Push once  folic acid 1 milliGRAM(s) Oral daily  furosemide   Injectable 40 milliGRAM(s) IV Push two times a day  gabapentin 300 milliGRAM(s) Oral daily  glucagon  Injectable 1 milliGRAM(s) IntraMuscular once  heparin   Injectable 5000 Unit(s) SubCutaneous three times a day  hydrALAZINE 10 milliGRAM(s) Oral three times a day  insulin glargine Injectable (LANTUS) 10 Unit(s) SubCutaneous at bedtime  insulin lispro (ADMELOG) corrective regimen sliding scale   SubCutaneous three times a day before meals  insulin lispro (ADMELOG) corrective regimen sliding scale   SubCutaneous at bedtime  levothyroxine 75 MICROGram(s) Oral daily  NIFEdipine XL 90 milliGRAM(s) Oral daily  pantoprazole    Tablet 40 milliGRAM(s) Oral before breakfast  sodium bicarbonate 650 milliGRAM(s) Oral three times a day    PRN Inpatient Medications  acetaminophen     Tablet .. 650 milliGRAM(s) Oral every 6 hours PRN      REVIEW OF SYSTEMS  --------------------------------------------------------------------------------  General: no fever  CVS: no chest pain  RESP: + sob, no cough  ABD: no abdominal pain  : no dysuria,  MSK: + edema     VITALS/PHYSICAL EXAM  --------------------------------------------------------------------------------  T(C): 36.8 (03-11-22 @ 05:00), Max: 37 (03-10-22 @ 21:00)  HR: 68 (03-11-22 @ 05:00) (58 - 68)  BP: 138/68 (03-11-22 @ 05:00) (125/70 - 138/68)  RR: 18 (03-11-22 @ 05:00) (18 - 18)  SpO2: 99% (03-11-22 @ 05:00) (97% - 99%)  Wt(kg): --  Height (cm): 154.9 (03-10-22 @ 11:50)  Weight (kg): 71.7 (03-10-22 @ 11:50)  BMI (kg/m2): 29.9 (03-10-22 @ 11:50)  BSA (m2): 1.71 (03-10-22 @ 11:50)      Physical Exam:  	Gen: NAD  	HEENT: MMM  	Pulm: crackles  B/L  	CV: S1S2  	Abd: Soft, +BS  	Ext: + LE edema B/L                      Neuro: Awake   	Skin: Warm and Dry   	Vascular access: NO HD catheter            no early  LABS/STUDIES  --------------------------------------------------------------------------------              8.1    5.83  >-----------<  170      [03-11-22 @ 07:51]              26.3     140  |  106  |  71  ----------------------------<  80      [03-11-22 @ 07:51]  4.6   |  21  |  3.78        Ca     7.7     [03-11-22 @ 07:51]      Mg     3.30     [03-11-22 @ 07:51]      Phos  6.5     [03-11-22 @ 07:51]    TPro  8.1  /  Alb  4.1  /  TBili  0.3  /  DBili  x   /  AST  52  /  ALT  93  /  AlkPhos  132  [03-09-22 @ 17:45]    PT/INR: PT 14.1 , INR 1.21       [03-09-22 @ 17:45]  PTT: 28.0       [03-09-22 @ 17:45]      Creatinine Trend:  SCr 3.78 [03-11 @ 07:51]  SCr 3.76 [03-10 @ 21:25]  SCr 3.82 [03-09 @ 17:45]    Urinalysis - [03-09-22 @ 18:16]      Color Light Yellow / Appearance Clear / SG 1.010 / pH 6.5      Gluc Negative / Ketone Negative  / Bili Negative / Urobili <2 mg/dL       Blood Negative / Protein 100 mg/dL / Leuk Est Small / Nitrite Negative      RBC 1 / WBC 5 / Hyaline 0 / Gran  / Sq Epi  / Non Sq Epi 1 / Bacteria Moderate    Urine Creatinine 51      [03-11-22 @ 05:52]  Urine Sodium 76      [03-11-22 @ 05:52]  Urine Potassium 22.6      [03-11-22 @ 05:52]  Urine Osmolality 371      [03-11-22 @ 05:52]

## 2022-03-11 NOTE — PROGRESS NOTE ADULT - SUBJECTIVE AND OBJECTIVE BOX
Gary Morrison MD  Interventional Cardiology / Endovascular Specialist  [place] Office : [address] [place] [address] [place] [address]  Tel:   [place] Office : [address] [facility] [place] [address]  Tel: [phone]      Pt lying in bed in NAD denies CP, some SOB in exertion     MEDICATIONS:  aspirin enteric coated 81 milliGRAM(s) Oral daily  carvedilol 6.25 milliGRAM(s) Oral every 12 hours  furosemide   Injectable 40 milliGRAM(s) IV Push two times a day  heparin   Injectable 5000 Unit(s) SubCutaneous three times a day  hydrALAZINE 10 milliGRAM(s) Oral three times a day  NIFEdipine XL 90 milliGRAM(s) Oral daily        acetaminophen     Tablet .. 650 milliGRAM(s) Oral every 6 hours PRN  gabapentin 300 milliGRAM(s) Oral daily    pantoprazole    Tablet 40 milliGRAM(s) Oral before breakfast    atorvastatin 20 milliGRAM(s) Oral at bedtime  dextrose 40% Gel 15 Gram(s) Oral once  dextrose 50% Injectable 25 Gram(s) IV Push once  dextrose 50% Injectable 12.5 Gram(s) IV Push once  dextrose 50% Injectable 25 Gram(s) IV Push once  glucagon  Injectable 1 milliGRAM(s) IntraMuscular once  insulin glargine Injectable (LANTUS) 10 Unit(s) SubCutaneous at bedtime  insulin lispro (ADMELOG) corrective regimen sliding scale   SubCutaneous three times a day before meals  insulin lispro (ADMELOG) corrective regimen sliding scale   SubCutaneous at bedtime  levothyroxine 75 MICROGram(s) Oral daily    calcium acetate 667 milliGRAM(s) Oral three times a day with meals  cyanocobalamin 1000 MICROGram(s) Oral daily  dextrose 5%. 1000 milliLiter(s) IV Continuous <Continuous>  dextrose 5%. 1000 milliLiter(s) IV Continuous <Continuous>  folic acid 1 milliGRAM(s) Oral daily  sodium bicarbonate 650 milliGRAM(s) Oral three times a day      PAST MEDICAL/SURGICAL HISTORY  PAST MEDICAL & SURGICAL HISTORY:  HTN (hypertension)    HLD (hyperlipidemia)        SOCIAL HISTORY: Substance Use (street drugs): ( x ) never used  (  ) other:    FAMILY HISTORY:  No pertinent family history in first degree relatives        REVIEW OF SYSTEMS:  CONSTITUTIONAL: No fever, weight loss, or fatigue  EYES: No eye pain, visual disturbances, or discharge  ENMT:  No difficulty hearing, tinnitus, vertigo; No sinus or throat pain  BREASTS: No pain, masses, or nipple discharge  GASTROINTESTINAL: No abdominal or epigastric pain. No nausea, vomiting, or hematemesis; No diarrhea or constipation. No melena or hematochezia.  GENITOURINARY: No dysuria, frequency, hematuria, or incontinence  NEUROLOGICAL: No headaches, memory loss, loss of strength, numbness, or tremors  ENDOCRINE: No heat or cold intolerance; No hair loss  MUSCULOSKELETAL: No joint pain or swelling; No muscle, back, or extremity pain  PSYCHIATRIC: No depression, anxiety, mood swings, or difficulty sleeping  HEME/LYMPH: No easy bruising, or bleeding gums  All others negative    PHYSICAL EXAM:  T(C): 36.7 (03-11-22 @ 12:55), Max: 37 (03-10-22 @ 21:00)  HR: 62 (03-11-22 @ 12:55) (58 - 68)  BP: 119/55 (03-11-22 @ 12:55) (119/55 - 138/68)  RR: 18 (03-11-22 @ 12:55) (18 - 18)  SpO2: 98% (03-11-22 @ 12:55) (97% - 99%)  Wt(kg): --  I&O's Summary      GENERAL: NAD  EYES: EOMI, PERRLA, conjunctiva and sclera clear  ENMT: No tonsillar erythema, exudates, or enlargement  Cardiovascular: Normal S1 S2, No JVD, No murmurs, No edema  Respiratory: b/l basal creps 	  Gastrointestinal:  Soft, Non-tender, + BS	  Extremities: mild LE edema                                 8.1    5.83  )-----------( 170      ( 11 Mar 2022 07:51 )             26.3     03-11    140  |  106  |  71<H>  ----------------------------<  80  4.6   |  21<L>  |  3.78<H>    Ca    7.7<L>      11 Mar 2022 07:51  Phos  6.5     03-11  Mg     3.30     03-11    TPro  8.1  /  Alb  4.1  /  TBili  0.3  /  DBili  x   /  AST  52<H>  /  ALT  93<H>  /  AlkPhos  132<H>  03-09    proBNP: Serum Pro-Brain Natriuretic Peptide: 3164 pg/mL (03-11 @ 07:51)    Lipid Profile:   HgA1c:   TSH:     Consultant(s) Notes Reviewed:  [x ] YES  [ ] NO    Care Discussed with Consultants/Other Providers [ x] YES  [ ] NO    Imaging Personally Reviewed independently:  [x] YES  [ ] NO    All labs, radiologic studies, vitals, orders and medications list reviewed. Patient is seen and examined at bedside. Case discussed with medical team.

## 2022-03-12 LAB
-  AMIKACIN: SIGNIFICANT CHANGE UP
-  AMOXICILLIN/CLAVULANIC ACID: SIGNIFICANT CHANGE UP
-  AMPICILLIN/SULBACTAM: SIGNIFICANT CHANGE UP
-  AMPICILLIN: SIGNIFICANT CHANGE UP
-  AZTREONAM: SIGNIFICANT CHANGE UP
-  CEFAZOLIN: SIGNIFICANT CHANGE UP
-  CEFEPIME: SIGNIFICANT CHANGE UP
-  CEFTRIAXONE: SIGNIFICANT CHANGE UP
-  CIPROFLOXACIN: SIGNIFICANT CHANGE UP
-  ERTAPENEM: SIGNIFICANT CHANGE UP
-  GENTAMICIN: SIGNIFICANT CHANGE UP
-  IMIPENEM: SIGNIFICANT CHANGE UP
-  LEVOFLOXACIN: SIGNIFICANT CHANGE UP
-  MEROPENEM: SIGNIFICANT CHANGE UP
-  NITROFURANTOIN: SIGNIFICANT CHANGE UP
-  PIPERACILLIN/TAZOBACTAM: SIGNIFICANT CHANGE UP
-  TIGECYCLINE: SIGNIFICANT CHANGE UP
-  TOBRAMYCIN: SIGNIFICANT CHANGE UP
-  TRIMETHOPRIM/SULFAMETHOXAZOLE: SIGNIFICANT CHANGE UP
ANION GAP SERPL CALC-SCNC: 17 MMOL/L — HIGH (ref 7–14)
BUN SERPL-MCNC: 78 MG/DL — HIGH (ref 7–23)
CALCIUM SERPL-MCNC: 8.1 MG/DL — LOW (ref 8.4–10.5)
CHLORIDE SERPL-SCNC: 105 MMOL/L — SIGNIFICANT CHANGE UP (ref 98–107)
CO2 SERPL-SCNC: 19 MMOL/L — LOW (ref 22–31)
CREAT SERPL-MCNC: 3.97 MG/DL — HIGH (ref 0.5–1.3)
CULTURE RESULTS: SIGNIFICANT CHANGE UP
EGFR: 12 ML/MIN/1.73M2 — LOW
GLUCOSE BLDC GLUCOMTR-MCNC: 125 MG/DL — HIGH (ref 70–99)
GLUCOSE BLDC GLUCOMTR-MCNC: 140 MG/DL — HIGH (ref 70–99)
GLUCOSE BLDC GLUCOMTR-MCNC: 143 MG/DL — HIGH (ref 70–99)
GLUCOSE BLDC GLUCOMTR-MCNC: 75 MG/DL — SIGNIFICANT CHANGE UP (ref 70–99)
GLUCOSE SERPL-MCNC: 91 MG/DL — SIGNIFICANT CHANGE UP (ref 70–99)
HCT VFR BLD CALC: 27.4 % — LOW (ref 34.5–45)
HGB BLD-MCNC: 8.8 G/DL — LOW (ref 11.5–15.5)
MAGNESIUM SERPL-MCNC: 3.1 MG/DL — HIGH (ref 1.6–2.6)
MCHC RBC-ENTMCNC: 29.4 PG — SIGNIFICANT CHANGE UP (ref 27–34)
MCHC RBC-ENTMCNC: 32.1 GM/DL — SIGNIFICANT CHANGE UP (ref 32–36)
MCV RBC AUTO: 91.6 FL — SIGNIFICANT CHANGE UP (ref 80–100)
METHOD TYPE: SIGNIFICANT CHANGE UP
NRBC # BLD: 0 /100 WBCS — SIGNIFICANT CHANGE UP
NRBC # FLD: 0 K/UL — SIGNIFICANT CHANGE UP
ORGANISM # SPEC MICROSCOPIC CNT: SIGNIFICANT CHANGE UP
ORGANISM # SPEC MICROSCOPIC CNT: SIGNIFICANT CHANGE UP
PHOSPHATE SERPL-MCNC: 6.3 MG/DL — HIGH (ref 2.5–4.5)
PLATELET # BLD AUTO: 172 K/UL — SIGNIFICANT CHANGE UP (ref 150–400)
POTASSIUM SERPL-MCNC: 4.3 MMOL/L — SIGNIFICANT CHANGE UP (ref 3.5–5.3)
POTASSIUM SERPL-SCNC: 4.3 MMOL/L — SIGNIFICANT CHANGE UP (ref 3.5–5.3)
RBC # BLD: 2.99 M/UL — LOW (ref 3.8–5.2)
RBC # FLD: 14.3 % — SIGNIFICANT CHANGE UP (ref 10.3–14.5)
SODIUM SERPL-SCNC: 141 MMOL/L — SIGNIFICANT CHANGE UP (ref 135–145)
SPECIMEN SOURCE: SIGNIFICANT CHANGE UP
WBC # BLD: 6.77 K/UL — SIGNIFICANT CHANGE UP (ref 3.8–10.5)
WBC # FLD AUTO: 6.77 K/UL — SIGNIFICANT CHANGE UP (ref 3.8–10.5)

## 2022-03-12 RX ADMIN — Medication 650 MILLIGRAM(S): at 13:06

## 2022-03-12 RX ADMIN — Medication 1 MILLIGRAM(S): at 13:06

## 2022-03-12 RX ADMIN — Medication 650 MILLIGRAM(S): at 04:47

## 2022-03-12 RX ADMIN — Medication 40 MILLIGRAM(S): at 17:08

## 2022-03-12 RX ADMIN — ATORVASTATIN CALCIUM 20 MILLIGRAM(S): 80 TABLET, FILM COATED ORAL at 22:19

## 2022-03-12 RX ADMIN — PREGABALIN 1000 MICROGRAM(S): 225 CAPSULE ORAL at 13:06

## 2022-03-12 RX ADMIN — CARVEDILOL PHOSPHATE 6.25 MILLIGRAM(S): 80 CAPSULE, EXTENDED RELEASE ORAL at 17:08

## 2022-03-12 RX ADMIN — INSULIN GLARGINE 10 UNIT(S): 100 INJECTION, SOLUTION SUBCUTANEOUS at 22:17

## 2022-03-12 RX ADMIN — PANTOPRAZOLE SODIUM 40 MILLIGRAM(S): 20 TABLET, DELAYED RELEASE ORAL at 04:47

## 2022-03-12 RX ADMIN — Medication 650 MILLIGRAM(S): at 22:19

## 2022-03-12 RX ADMIN — Medication 667 MILLIGRAM(S): at 17:08

## 2022-03-12 RX ADMIN — Medication 90 MILLIGRAM(S): at 04:46

## 2022-03-12 RX ADMIN — HEPARIN SODIUM 5000 UNIT(S): 5000 INJECTION INTRAVENOUS; SUBCUTANEOUS at 22:17

## 2022-03-12 RX ADMIN — HEPARIN SODIUM 5000 UNIT(S): 5000 INJECTION INTRAVENOUS; SUBCUTANEOUS at 13:06

## 2022-03-12 RX ADMIN — Medication 40 MILLIGRAM(S): at 04:46

## 2022-03-12 RX ADMIN — Medication 10 MILLIGRAM(S): at 13:06

## 2022-03-12 RX ADMIN — HEPARIN SODIUM 5000 UNIT(S): 5000 INJECTION INTRAVENOUS; SUBCUTANEOUS at 04:46

## 2022-03-12 RX ADMIN — Medication 667 MILLIGRAM(S): at 09:10

## 2022-03-12 RX ADMIN — Medication 10 MILLIGRAM(S): at 04:47

## 2022-03-12 RX ADMIN — Medication 10 MILLIGRAM(S): at 22:19

## 2022-03-12 RX ADMIN — Medication 667 MILLIGRAM(S): at 13:05

## 2022-03-12 RX ADMIN — Medication 75 MICROGRAM(S): at 04:47

## 2022-03-12 RX ADMIN — GABAPENTIN 300 MILLIGRAM(S): 400 CAPSULE ORAL at 13:06

## 2022-03-12 RX ADMIN — Medication 81 MILLIGRAM(S): at 13:06

## 2022-03-12 RX ADMIN — CARVEDILOL PHOSPHATE 6.25 MILLIGRAM(S): 80 CAPSULE, EXTENDED RELEASE ORAL at 04:46

## 2022-03-12 NOTE — PROGRESS NOTE ADULT - SUBJECTIVE AND OBJECTIVE BOX
Gary Morrison MD  Interventional Cardiology / Endovascular Specialist  [place] Office : [address] [place] [address] [place] [address]  Tel:   [place] Office : [address] [facility] [place] [address]  Tel: [phone]    Pt lying in bed in NAD denies CP, some SOB in exertion   	  MEDICATIONS:  aspirin enteric coated 81 milliGRAM(s) Oral daily  carvedilol 6.25 milliGRAM(s) Oral every 12 hours  furosemide   Injectable 40 milliGRAM(s) IV Push two times a day  heparin   Injectable 5000 Unit(s) SubCutaneous three times a day  hydrALAZINE 10 milliGRAM(s) Oral three times a day  NIFEdipine XL 90 milliGRAM(s) Oral daily        acetaminophen     Tablet .. 650 milliGRAM(s) Oral every 6 hours PRN  gabapentin 300 milliGRAM(s) Oral daily    pantoprazole    Tablet 40 milliGRAM(s) Oral before breakfast    atorvastatin 20 milliGRAM(s) Oral at bedtime  dextrose 40% Gel 15 Gram(s) Oral once  dextrose 50% Injectable 25 Gram(s) IV Push once  dextrose 50% Injectable 12.5 Gram(s) IV Push once  dextrose 50% Injectable 25 Gram(s) IV Push once  glucagon  Injectable 1 milliGRAM(s) IntraMuscular once  insulin glargine Injectable (LANTUS) 10 Unit(s) SubCutaneous at bedtime  insulin lispro (ADMELOG) corrective regimen sliding scale   SubCutaneous three times a day before meals  insulin lispro (ADMELOG) corrective regimen sliding scale   SubCutaneous at bedtime  levothyroxine 75 MICROGram(s) Oral daily    calcium acetate 667 milliGRAM(s) Oral three times a day with meals  cyanocobalamin 1000 MICROGram(s) Oral daily  dextrose 5%. 1000 milliLiter(s) IV Continuous <Continuous>  dextrose 5%. 1000 milliLiter(s) IV Continuous <Continuous>  folic acid 1 milliGRAM(s) Oral daily  sodium bicarbonate 650 milliGRAM(s) Oral three times a day      PAST MEDICAL/SURGICAL HISTORY  PAST MEDICAL & SURGICAL HISTORY:  HTN (hypertension)    HLD (hyperlipidemia)        SOCIAL HISTORY: Substance Use (street drugs): ( x ) never used  (  ) other:    FAMILY HISTORY:  No pertinent family history in first degree relatives        REVIEW OF SYSTEMS:  CONSTITUTIONAL: No fever, weight loss, or fatigue  EYES: No eye pain, visual disturbances, or discharge  ENMT:  No difficulty hearing, tinnitus, vertigo; No sinus or throat pain  BREASTS: No pain, masses, or nipple discharge  GASTROINTESTINAL: No abdominal or epigastric pain. No nausea, vomiting, or hematemesis; No diarrhea or constipation. No melena or hematochezia.  GENITOURINARY: No dysuria, frequency, hematuria, or incontinence  NEUROLOGICAL: No headaches, memory loss, loss of strength, numbness, or tremors  ENDOCRINE: No heat or cold intolerance; No hair loss  MUSCULOSKELETAL: No joint pain or swelling; No muscle, back, or extremity pain  PSYCHIATRIC: No depression, anxiety, mood swings, or difficulty sleeping  HEME/LYMPH: No easy bruising, or bleeding gums  All others negative    PHYSICAL EXAM:  T(C): 36.7 (03-12-22 @ 04:45), Max: 36.8 (03-11-22 @ 22:15)  HR: 63 (03-12-22 @ 04:45) (62 - 70)  BP: 158/58 (03-12-22 @ 04:45) (119/55 - 158/58)  RR: 18 (03-12-22 @ 04:45) (18 - 18)  SpO2: 97% (03-12-22 @ 04:45) (93% - 98%)  Wt(kg): --  I&O's Summary    GENERAL: NAD  EYES: EOMI, PERRLA, conjunctiva and sclera clear  ENMT: No tonsillar erythema, exudates, or enlargement  Cardiovascular: Normal S1 S2, No JVD, No murmurs, No edema  Respiratory: b/l basal creps 	  Gastrointestinal:  Soft, Non-tender, + BS	  Extremities: mild LE edema                               8.8    6.77  )-----------( 172      ( 12 Mar 2022 07:42 )             27.4     03-12    141  |  105  |  78<H>  ----------------------------<  91  4.3   |  19<L>  |  3.97<H>    Ca    8.1<L>      12 Mar 2022 07:42  Phos  6.3     03-12  Mg     3.10     03-12      proBNP:   Lipid Profile:   HgA1c:   TSH:     Consultant(s) Notes Reviewed:  [x ] YES  [ ] NO    Care Discussed with Consultants/Other Providers [ x] YES  [ ] NO    Imaging Personally Reviewed independently:  [x] YES  [ ] NO    All labs, radiologic studies, vitals, orders and medications list reviewed. Patient is seen and examined at bedside. Case discussed with medical team.

## 2022-03-12 NOTE — PROGRESS NOTE ADULT - SUBJECTIVE AND OBJECTIVE BOX
LA TRAN  66y  Patient is a 66y old  Female who presents with a chief complaint of abnormal labs (12 Mar 2022 08:33)    HPI:  Followed for NISH on CKD.      HEALTH ISSUES - PROBLEM Dx:  Acute on chronic kidney failure    Essential hypertension    Type 2 diabetes mellitus treated with insulin    Hypothyroidism    Chest pain          MEDICATIONS  (STANDING):  aspirin enteric coated 81 milliGRAM(s) Oral daily  atorvastatin 20 milliGRAM(s) Oral at bedtime  calcium acetate 667 milliGRAM(s) Oral three times a day with meals  carvedilol 6.25 milliGRAM(s) Oral every 12 hours  cyanocobalamin 1000 MICROGram(s) Oral daily  dextrose 40% Gel 15 Gram(s) Oral once  dextrose 5%. 1000 milliLiter(s) (50 mL/Hr) IV Continuous <Continuous>  dextrose 5%. 1000 milliLiter(s) (100 mL/Hr) IV Continuous <Continuous>  dextrose 50% Injectable 25 Gram(s) IV Push once  dextrose 50% Injectable 12.5 Gram(s) IV Push once  dextrose 50% Injectable 25 Gram(s) IV Push once  folic acid 1 milliGRAM(s) Oral daily  furosemide   Injectable 40 milliGRAM(s) IV Push two times a day  gabapentin 300 milliGRAM(s) Oral daily  glucagon  Injectable 1 milliGRAM(s) IntraMuscular once  heparin   Injectable 5000 Unit(s) SubCutaneous three times a day  hydrALAZINE 10 milliGRAM(s) Oral three times a day  insulin glargine Injectable (LANTUS) 10 Unit(s) SubCutaneous at bedtime  insulin lispro (ADMELOG) corrective regimen sliding scale   SubCutaneous three times a day before meals  insulin lispro (ADMELOG) corrective regimen sliding scale   SubCutaneous at bedtime  levothyroxine 75 MICROGram(s) Oral daily  NIFEdipine XL 90 milliGRAM(s) Oral daily  pantoprazole    Tablet 40 milliGRAM(s) Oral before breakfast  sodium bicarbonate 650 milliGRAM(s) Oral three times a day    MEDICATIONS  (PRN):  acetaminophen     Tablet .. 650 milliGRAM(s) Oral every 6 hours PRN Temp greater or equal to 38C (100.4F), Mild Pain (1 - 3), Moderate Pain (4 - 6), Severe Pain (7 - 10)    Vital Signs Last 24 Hrs  T(C): 36.8 (12 Mar 2022 11:58), Max: 36.8 (11 Mar 2022 22:15)  T(F): 98.3 (12 Mar 2022 11:58), Max: 98.3 (12 Mar 2022 11:58)  HR: 65 (12 Mar 2022 13:05) (63 - 70)  BP: 126/54 (12 Mar 2022 13:05) (113/55 - 158/58)  BP(mean): --  RR: 18 (12 Mar 2022 13:05) (18 - 20)  SpO2: 98% (12 Mar 2022 13:05) (97% - 99%)  Daily     Daily     PHYSICAL EXAM:  Constitutional: She  appears comfortable and not distressed. Not diaphoretic.    Neck:  The thyroid is normal. Trachea is midline.     Respiratory: The lungs are clear to auscultation. No dullness and expansion is normal.    Cardiovascular: S1 and S2 are normal. No mummurs, rubs or gallops are present.    Gastrointestinal: The abdomen is soft. No tenderness is present.     Genitourinary: The bladder is not distended. No CVA tenderness is present.    Extremities: No edema is noted. No deformities are present.    Neurological: Cognition is normal. Tone, power and sensation are normal.     Skin: No lesions are seen  or palpated.    Lymph Nodes: No lymphadenopathy is present.    Psychiatric: Mood is appropriate. No hallucinations or flight of ideas are noted.                              8.8    6.77  )-----------( 172      ( 12 Mar 2022 07:42 )             27.4     03-12    141  |  105  |  78<H>  ----------------------------<  91  4.3   |  19<L>  |  3.97<H>    Ca    8.1<L>      12 Mar 2022 07:42  Phos  6.3     03-12  Mg     3.10     03-12    < from: US Kidney and Bladder (03.09.22 @ 19:03) >  Right kidney: 8.7 cm. No renal mass, hydronephrosis or calculi.    Left kidney: 7.5 cm. No renal mass, hydronephrosis or calculi.      < end of copied text >

## 2022-03-12 NOTE — PROGRESS NOTE ADULT - SUBJECTIVE AND OBJECTIVE BOX
TTE 3/11/21 revealed severe pulm HTN, Stage I diastolic dysfunction, nl LV systolic function  Started on lasix 40 mg IV bid 3/11/21    Vital Signs Last 24 Hrs  T(C): 36.7 (12 Mar 2022 04:45), Max: 36.8 (11 Mar 2022 22:15)  T(F): 98.1 (12 Mar 2022 04:45), Max: 98.2 (11 Mar 2022 22:15)  HR: 63 (12 Mar 2022 04:45) (62 - 70)  BP: 158/58 (12 Mar 2022 04:45) (119/55 - 158/58)  BP(mean): --  RR: 18 (12 Mar 2022 04:45) (18 - 18)  SpO2: 97% (12 Mar 2022 04:45) (93% - 98%)    I&O's Summary      Gen: NAD  Head: NCAT, EOMI  Lungs: rales rt base > lt base; no wheezes  Heart: RRR, nl S1/S2, no murmurs  Abd: soft, NTND, NABS  Neuro: A+O x 3  Exts: no LE edema b/l    LABS:                        8.8    6.77  )-----------( 172      ( 12 Mar 2022 07:42 )             27.4     03-11    140  |  106  |  71<H>  ----------------------------<  80  4.6   |  21<L>  |  3.78<H>    Ca    7.7<L>      11 Mar 2022 07:51  Phos  6.5     03-11  Mg     3.30     03-11        CAPILLARY BLOOD GLUCOSE      POCT Blood Glucose.: 75 mg/dL (12 Mar 2022 08:17)  POCT Blood Glucose.: 124 mg/dL (11 Mar 2022 22:02)  POCT Blood Glucose.: 196 mg/dL (11 Mar 2022 17:09)  POCT Blood Glucose.: 146 mg/dL (11 Mar 2022 12:15)  POCT Blood Glucose.: 97 mg/dL (11 Mar 2022 08:28)            RADIOLOGY & ADDITIONAL TESTS:    Imaging Personally Reviewed:  [x] YES  [ ] NO    Will obtain old records:  [ ] YES  [x] NO

## 2022-03-13 LAB
ANION GAP SERPL CALC-SCNC: 15 MMOL/L — HIGH (ref 7–14)
BUN SERPL-MCNC: 75 MG/DL — HIGH (ref 7–23)
CALCIUM SERPL-MCNC: 8.3 MG/DL — LOW (ref 8.4–10.5)
CHLORIDE SERPL-SCNC: 102 MMOL/L — SIGNIFICANT CHANGE UP (ref 98–107)
CO2 SERPL-SCNC: 20 MMOL/L — LOW (ref 22–31)
CREAT SERPL-MCNC: 3.75 MG/DL — HIGH (ref 0.5–1.3)
EGFR: 13 ML/MIN/1.73M2 — LOW
GLUCOSE BLDC GLUCOMTR-MCNC: 108 MG/DL — HIGH (ref 70–99)
GLUCOSE BLDC GLUCOMTR-MCNC: 130 MG/DL — HIGH (ref 70–99)
GLUCOSE BLDC GLUCOMTR-MCNC: 156 MG/DL — HIGH (ref 70–99)
GLUCOSE BLDC GLUCOMTR-MCNC: 171 MG/DL — HIGH (ref 70–99)
GLUCOSE SERPL-MCNC: 108 MG/DL — HIGH (ref 70–99)
HCT VFR BLD CALC: 27.1 % — LOW (ref 34.5–45)
HGB BLD-MCNC: 8.7 G/DL — LOW (ref 11.5–15.5)
MAGNESIUM SERPL-MCNC: 2.7 MG/DL — HIGH (ref 1.6–2.6)
MCHC RBC-ENTMCNC: 28.8 PG — SIGNIFICANT CHANGE UP (ref 27–34)
MCHC RBC-ENTMCNC: 32.1 GM/DL — SIGNIFICANT CHANGE UP (ref 32–36)
MCV RBC AUTO: 89.7 FL — SIGNIFICANT CHANGE UP (ref 80–100)
NRBC # BLD: 0 /100 WBCS — SIGNIFICANT CHANGE UP
NRBC # FLD: 0.02 K/UL — HIGH
PHOSPHATE SERPL-MCNC: 6.3 MG/DL — HIGH (ref 2.5–4.5)
PLATELET # BLD AUTO: 149 K/UL — LOW (ref 150–400)
POTASSIUM SERPL-MCNC: 4.2 MMOL/L — SIGNIFICANT CHANGE UP (ref 3.5–5.3)
POTASSIUM SERPL-SCNC: 4.2 MMOL/L — SIGNIFICANT CHANGE UP (ref 3.5–5.3)
RBC # BLD: 3.02 M/UL — LOW (ref 3.8–5.2)
RBC # FLD: 14.3 % — SIGNIFICANT CHANGE UP (ref 10.3–14.5)
SODIUM SERPL-SCNC: 137 MMOL/L — SIGNIFICANT CHANGE UP (ref 135–145)
WBC # BLD: 5.87 K/UL — SIGNIFICANT CHANGE UP (ref 3.8–10.5)
WBC # FLD AUTO: 5.87 K/UL — SIGNIFICANT CHANGE UP (ref 3.8–10.5)

## 2022-03-13 RX ADMIN — CARVEDILOL PHOSPHATE 6.25 MILLIGRAM(S): 80 CAPSULE, EXTENDED RELEASE ORAL at 05:06

## 2022-03-13 RX ADMIN — PANTOPRAZOLE SODIUM 40 MILLIGRAM(S): 20 TABLET, DELAYED RELEASE ORAL at 05:06

## 2022-03-13 RX ADMIN — Medication 40 MILLIGRAM(S): at 17:48

## 2022-03-13 RX ADMIN — Medication 75 MICROGRAM(S): at 05:06

## 2022-03-13 RX ADMIN — Medication 90 MILLIGRAM(S): at 05:06

## 2022-03-13 RX ADMIN — Medication 81 MILLIGRAM(S): at 13:13

## 2022-03-13 RX ADMIN — Medication 667 MILLIGRAM(S): at 10:31

## 2022-03-13 RX ADMIN — CARVEDILOL PHOSPHATE 6.25 MILLIGRAM(S): 80 CAPSULE, EXTENDED RELEASE ORAL at 17:48

## 2022-03-13 RX ADMIN — Medication 40 MILLIGRAM(S): at 05:07

## 2022-03-13 RX ADMIN — HEPARIN SODIUM 5000 UNIT(S): 5000 INJECTION INTRAVENOUS; SUBCUTANEOUS at 21:16

## 2022-03-13 RX ADMIN — Medication 1: at 13:12

## 2022-03-13 RX ADMIN — Medication 1: at 17:44

## 2022-03-13 RX ADMIN — INSULIN GLARGINE 10 UNIT(S): 100 INJECTION, SOLUTION SUBCUTANEOUS at 21:15

## 2022-03-13 RX ADMIN — Medication 10 MILLIGRAM(S): at 13:13

## 2022-03-13 RX ADMIN — Medication 650 MILLIGRAM(S): at 05:06

## 2022-03-13 RX ADMIN — HEPARIN SODIUM 5000 UNIT(S): 5000 INJECTION INTRAVENOUS; SUBCUTANEOUS at 13:14

## 2022-03-13 RX ADMIN — PREGABALIN 1000 MICROGRAM(S): 225 CAPSULE ORAL at 13:13

## 2022-03-13 RX ADMIN — GABAPENTIN 300 MILLIGRAM(S): 400 CAPSULE ORAL at 13:13

## 2022-03-13 RX ADMIN — Medication 10 MILLIGRAM(S): at 21:17

## 2022-03-13 RX ADMIN — Medication 667 MILLIGRAM(S): at 17:48

## 2022-03-13 RX ADMIN — Medication 650 MILLIGRAM(S): at 13:13

## 2022-03-13 RX ADMIN — Medication 650 MILLIGRAM(S): at 21:17

## 2022-03-13 RX ADMIN — Medication 10 MILLIGRAM(S): at 05:06

## 2022-03-13 RX ADMIN — Medication 1 MILLIGRAM(S): at 13:13

## 2022-03-13 RX ADMIN — Medication 667 MILLIGRAM(S): at 13:24

## 2022-03-13 RX ADMIN — HEPARIN SODIUM 5000 UNIT(S): 5000 INJECTION INTRAVENOUS; SUBCUTANEOUS at 05:06

## 2022-03-13 RX ADMIN — ATORVASTATIN CALCIUM 20 MILLIGRAM(S): 80 TABLET, FILM COATED ORAL at 21:17

## 2022-03-13 NOTE — PROGRESS NOTE ADULT - SUBJECTIVE AND OBJECTIVE BOX
No dizziness   Breathing comfortably @ rest    Vital Signs Last 24 Hrs  T(C): 36.6 (13 Mar 2022 05:08), Max: 36.8 (12 Mar 2022 11:58)  T(F): 97.8 (13 Mar 2022 05:08), Max: 98.3 (12 Mar 2022 11:58)  HR: 60 (13 Mar 2022 05:08) (60 - 65)  BP: 136/60 (13 Mar 2022 05:08) (113/55 - 142/63)  BP(mean): --  RR: 18 (13 Mar 2022 05:08) (18 - 20)  SpO2: 98% (13 Mar 2022 05:08) (98% - 100%)    I&O's Summary      Gen: NAD  Head: NCAT, EOMI  Lungs: rales rt base > lt base; no wheezes  Heart: RRR, nl S1/S2, no murmurs  Abd: soft, NTND, NABS  Neuro: A+O x 3  Exts: no LE edema b/l    LABS:                        8.7    5.87  )-----------( 149      ( 13 Mar 2022 08:14 )             27.1     03-12    141  |  105  |  78<H>  ----------------------------<  91  4.3   |  19<L>  |  3.97<H>    Ca    8.1<L>      12 Mar 2022 07:42  Phos  6.3     03-12  Mg     3.10     03-12        CAPILLARY BLOOD GLUCOSE      POCT Blood Glucose.: 143 mg/dL (12 Mar 2022 21:41)  POCT Blood Glucose.: 125 mg/dL (12 Mar 2022 17:12)  POCT Blood Glucose.: 140 mg/dL (12 Mar 2022 12:15)            RADIOLOGY & ADDITIONAL TESTS:    Imaging Personally Reviewed:  [x] YES  [ ] NO    Will obtain old records:  [ ] YES  [x] NO

## 2022-03-13 NOTE — PROGRESS NOTE ADULT - SUBJECTIVE AND OBJECTIVE BOX
Gary Morrison MD  Interventional Cardiology / Endovascular Specialist  [place] Office : [address] [place] [address] [place] [address]  Tel:   [place] Office : [address] [facility] [place] [address]  Tel: [phone]    Pt lying in bed in NAD denies CP, some SOB in exertion   	  MEDICATIONS:  aspirin enteric coated 81 milliGRAM(s) Oral daily  carvedilol 6.25 milliGRAM(s) Oral every 12 hours  furosemide   Injectable 40 milliGRAM(s) IV Push two times a day  heparin   Injectable 5000 Unit(s) SubCutaneous three times a day  hydrALAZINE 10 milliGRAM(s) Oral three times a day  NIFEdipine XL 90 milliGRAM(s) Oral daily        acetaminophen     Tablet .. 650 milliGRAM(s) Oral every 6 hours PRN  gabapentin 300 milliGRAM(s) Oral daily    pantoprazole    Tablet 40 milliGRAM(s) Oral before breakfast    atorvastatin 20 milliGRAM(s) Oral at bedtime  dextrose 40% Gel 15 Gram(s) Oral once  dextrose 50% Injectable 25 Gram(s) IV Push once  dextrose 50% Injectable 12.5 Gram(s) IV Push once  dextrose 50% Injectable 25 Gram(s) IV Push once  glucagon  Injectable 1 milliGRAM(s) IntraMuscular once  insulin glargine Injectable (LANTUS) 10 Unit(s) SubCutaneous at bedtime  insulin lispro (ADMELOG) corrective regimen sliding scale   SubCutaneous three times a day before meals  insulin lispro (ADMELOG) corrective regimen sliding scale   SubCutaneous at bedtime  levothyroxine 75 MICROGram(s) Oral daily    calcium acetate 667 milliGRAM(s) Oral three times a day with meals  cyanocobalamin 1000 MICROGram(s) Oral daily  dextrose 5%. 1000 milliLiter(s) IV Continuous <Continuous>  dextrose 5%. 1000 milliLiter(s) IV Continuous <Continuous>  folic acid 1 milliGRAM(s) Oral daily  sodium bicarbonate 650 milliGRAM(s) Oral three times a day      PAST MEDICAL/SURGICAL HISTORY  PAST MEDICAL & SURGICAL HISTORY:  HTN (hypertension)    HLD (hyperlipidemia)        SOCIAL HISTORY: Substance Use (street drugs): ( x ) never used  (  ) other:    FAMILY HISTORY:  No pertinent family history in first degree relatives        REVIEW OF SYSTEMS:  CONSTITUTIONAL: No fever, weight loss, or fatigue  EYES: No eye pain, visual disturbances, or discharge  ENMT:  No difficulty hearing, tinnitus, vertigo; No sinus or throat pain  BREASTS: No pain, masses, or nipple discharge  GASTROINTESTINAL: No abdominal or epigastric pain. No nausea, vomiting, or hematemesis; No diarrhea or constipation. No melena or hematochezia.  GENITOURINARY: No dysuria, frequency, hematuria, or incontinence  NEUROLOGICAL: No headaches, memory loss, loss of strength, numbness, or tremors  ENDOCRINE: No heat or cold intolerance; No hair loss  MUSCULOSKELETAL: No joint pain or swelling; No muscle, back, or extremity pain  PSYCHIATRIC: No depression, anxiety, mood swings, or difficulty sleeping  HEME/LYMPH: No easy bruising, or bleeding gums  All others negative    PHYSICAL EXAM:  T(C): 36.1 (03-13-22 @ 13:10), Max: 36.7 (03-12-22 @ 22:20)  HR: 61 (03-13-22 @ 13:10) (60 - 62)  BP: 116/62 (03-13-22 @ 13:10) (116/62 - 142/63)  RR: 18 (03-13-22 @ 13:10) (18 - 18)  SpO2: 98% (03-13-22 @ 13:10) (98% - 100%)  Wt(kg): --  I&O's Summary      GENERAL: NAD  EYES: EOMI, PERRLA, conjunctiva and sclera clear  ENMT: No tonsillar erythema, exudates, or enlargement  Cardiovascular: Normal S1 S2, No JVD, No murmurs, No edema  Respiratory: b/l basal creps 	  Gastrointestinal:  Soft, Non-tender, + BS	  Extremities: mild LE edema                               8.7    5.87  )-----------( 149      ( 13 Mar 2022 08:14 )             27.1     03-13    137  |  102  |  75<H>  ----------------------------<  108<H>  4.2   |  20<L>  |  3.75<H>    Ca    8.3<L>      13 Mar 2022 08:14  Phos  6.3     03-13  Mg     2.70     03-13      proBNP:   Lipid Profile:   HgA1c:   TSH:     Consultant(s) Notes Reviewed:  [x ] YES  [ ] NO    Care Discussed with Consultants/Other Providers [ x] YES  [ ] NO    Imaging Personally Reviewed independently:  [x] YES  [ ] NO    All labs, radiologic studies, vitals, orders and medications list reviewed. Patient is seen and examined at bedside. Case discussed with medical team.

## 2022-03-13 NOTE — PROGRESS NOTE ADULT - SUBJECTIVE AND OBJECTIVE BOX
LA TRAN  66y  Patient is a 66y old  Female who presents with a chief complaint of abnormal labs (13 Mar 2022 15:30)    HPI:  Followed for CKD.      HEALTH ISSUES - PROBLEM Dx:  Acute on chronic kidney failure    Essential hypertension    Type 2 diabetes mellitus treated with insulin    Hypothyroidism    Chest pain          MEDICATIONS  (STANDING):  aspirin enteric coated 81 milliGRAM(s) Oral daily  atorvastatin 20 milliGRAM(s) Oral at bedtime  calcium acetate 667 milliGRAM(s) Oral three times a day with meals  carvedilol 6.25 milliGRAM(s) Oral every 12 hours  cyanocobalamin 1000 MICROGram(s) Oral daily  dextrose 40% Gel 15 Gram(s) Oral once  dextrose 5%. 1000 milliLiter(s) (50 mL/Hr) IV Continuous <Continuous>  dextrose 5%. 1000 milliLiter(s) (100 mL/Hr) IV Continuous <Continuous>  dextrose 50% Injectable 25 Gram(s) IV Push once  dextrose 50% Injectable 12.5 Gram(s) IV Push once  dextrose 50% Injectable 25 Gram(s) IV Push once  folic acid 1 milliGRAM(s) Oral daily  furosemide   Injectable 40 milliGRAM(s) IV Push two times a day  gabapentin 300 milliGRAM(s) Oral daily  glucagon  Injectable 1 milliGRAM(s) IntraMuscular once  heparin   Injectable 5000 Unit(s) SubCutaneous three times a day  hydrALAZINE 10 milliGRAM(s) Oral three times a day  insulin glargine Injectable (LANTUS) 10 Unit(s) SubCutaneous at bedtime  insulin lispro (ADMELOG) corrective regimen sliding scale   SubCutaneous three times a day before meals  insulin lispro (ADMELOG) corrective regimen sliding scale   SubCutaneous at bedtime  levothyroxine 75 MICROGram(s) Oral daily  NIFEdipine XL 90 milliGRAM(s) Oral daily  pantoprazole    Tablet 40 milliGRAM(s) Oral before breakfast  sodium bicarbonate 650 milliGRAM(s) Oral three times a day    MEDICATIONS  (PRN):  acetaminophen     Tablet .. 650 milliGRAM(s) Oral every 6 hours PRN Temp greater or equal to 38C (100.4F), Mild Pain (1 - 3), Moderate Pain (4 - 6), Severe Pain (7 - 10)    Vital Signs Last 24 Hrs  T(C): 36.7 (13 Mar 2022 17:45), Max: 36.7 (12 Mar 2022 22:20)  T(F): 98 (13 Mar 2022 17:45), Max: 98 (12 Mar 2022 22:20)  HR: 65 (13 Mar 2022 17:45) (60 - 65)  BP: 120/59 (13 Mar 2022 17:45) (116/62 - 142/63)  BP(mean): --  RR: 18 (13 Mar 2022 17:45) (18 - 18)  SpO2: 98% (13 Mar 2022 17:45) (98% - 100%)  Daily     Daily     PHYSICAL EXAM:  Constitutional:  She appears comfortable and not distressed. Not diaphoretic.    Neck:  The thyroid is normal. Trachea is midline.     Respiratory: The lungs are clear to auscultation. No dullness and expansion is normal.    Cardiovascular: S1 and S2 are normal. No mummurs, rubs or gallops are present.    Gastrointestinal: The abdomen is soft. No tenderness is present. No masses are present. Bowel sounds are normal.    Genitourinary: The bladder is not distended. No CVA tenderness is present.    Extremities: No edema is noted. No deformities are present.    Neurological: Cognition is normal. Tone, power and sensation are normal. Gait is steady.    Skin: No lesions are seen  or palpated.    Lymph Nodes: No lymphadenopathy is present.                            8.7    5.87  )-----------( 149      ( 13 Mar 2022 08:14 )             27.1     03-13    137  |  102  |  75<H>  ----------------------------<  108<H>  4.2   |  20<L>  |  3.75<H>    Ca    8.3<L>      13 Mar 2022 08:14  Phos  6.3     03-13  Mg     2.70     03-13

## 2022-03-14 LAB
ANION GAP SERPL CALC-SCNC: 15 MMOL/L — HIGH (ref 7–14)
ANION GAP SERPL CALC-SCNC: 18 MMOL/L — HIGH (ref 7–14)
BUN SERPL-MCNC: 84 MG/DL — HIGH (ref 7–23)
BUN SERPL-MCNC: 88 MG/DL — HIGH (ref 7–23)
CALCIUM SERPL-MCNC: 8.2 MG/DL — LOW (ref 8.4–10.5)
CALCIUM SERPL-MCNC: 8.6 MG/DL — SIGNIFICANT CHANGE UP (ref 8.4–10.5)
CHLORIDE SERPL-SCNC: 98 MMOL/L — SIGNIFICANT CHANGE UP (ref 98–107)
CHLORIDE SERPL-SCNC: 99 MMOL/L — SIGNIFICANT CHANGE UP (ref 98–107)
CO2 SERPL-SCNC: 19 MMOL/L — LOW (ref 22–31)
CO2 SERPL-SCNC: 19 MMOL/L — LOW (ref 22–31)
CREAT SERPL-MCNC: 3.88 MG/DL — HIGH (ref 0.5–1.3)
CREAT SERPL-MCNC: 3.9 MG/DL — HIGH (ref 0.5–1.3)
EGFR: 12 ML/MIN/1.73M2 — LOW
EGFR: 12 ML/MIN/1.73M2 — LOW
GLUCOSE BLDC GLUCOMTR-MCNC: 163 MG/DL — HIGH (ref 70–99)
GLUCOSE BLDC GLUCOMTR-MCNC: 168 MG/DL — HIGH (ref 70–99)
GLUCOSE BLDC GLUCOMTR-MCNC: 178 MG/DL — HIGH (ref 70–99)
GLUCOSE BLDC GLUCOMTR-MCNC: 193 MG/DL — HIGH (ref 70–99)
GLUCOSE SERPL-MCNC: 168 MG/DL — HIGH (ref 70–99)
GLUCOSE SERPL-MCNC: 94 MG/DL — SIGNIFICANT CHANGE UP (ref 70–99)
HCT VFR BLD CALC: 26.6 % — LOW (ref 34.5–45)
HGB BLD-MCNC: 8.6 G/DL — LOW (ref 11.5–15.5)
MAGNESIUM SERPL-MCNC: 2.4 MG/DL — SIGNIFICANT CHANGE UP (ref 1.6–2.6)
MAGNESIUM SERPL-MCNC: 2.6 MG/DL — SIGNIFICANT CHANGE UP (ref 1.6–2.6)
MCHC RBC-ENTMCNC: 29.2 PG — SIGNIFICANT CHANGE UP (ref 27–34)
MCHC RBC-ENTMCNC: 32.3 GM/DL — SIGNIFICANT CHANGE UP (ref 32–36)
MCV RBC AUTO: 90.2 FL — SIGNIFICANT CHANGE UP (ref 80–100)
NRBC # BLD: 0 /100 WBCS — SIGNIFICANT CHANGE UP
NRBC # FLD: 0 K/UL — SIGNIFICANT CHANGE UP
PHOSPHATE SERPL-MCNC: 5.7 MG/DL — HIGH (ref 2.5–4.5)
PHOSPHATE SERPL-MCNC: 5.8 MG/DL — HIGH (ref 2.5–4.5)
PLATELET # BLD AUTO: 158 K/UL — SIGNIFICANT CHANGE UP (ref 150–400)
POTASSIUM SERPL-MCNC: 4.3 MMOL/L — SIGNIFICANT CHANGE UP (ref 3.5–5.3)
POTASSIUM SERPL-MCNC: 4.3 MMOL/L — SIGNIFICANT CHANGE UP (ref 3.5–5.3)
POTASSIUM SERPL-SCNC: 4.3 MMOL/L — SIGNIFICANT CHANGE UP (ref 3.5–5.3)
POTASSIUM SERPL-SCNC: 4.3 MMOL/L — SIGNIFICANT CHANGE UP (ref 3.5–5.3)
RBC # BLD: 2.95 M/UL — LOW (ref 3.8–5.2)
RBC # FLD: 14.3 % — SIGNIFICANT CHANGE UP (ref 10.3–14.5)
SODIUM SERPL-SCNC: 132 MMOL/L — LOW (ref 135–145)
SODIUM SERPL-SCNC: 136 MMOL/L — SIGNIFICANT CHANGE UP (ref 135–145)
WBC # BLD: 6.11 K/UL — SIGNIFICANT CHANGE UP (ref 3.8–10.5)
WBC # FLD AUTO: 6.11 K/UL — SIGNIFICANT CHANGE UP (ref 3.8–10.5)

## 2022-03-14 PROCEDURE — 93016 CV STRESS TEST SUPVJ ONLY: CPT | Mod: GC

## 2022-03-14 PROCEDURE — 93018 CV STRESS TEST I&R ONLY: CPT | Mod: GC

## 2022-03-14 PROCEDURE — 78452 HT MUSCLE IMAGE SPECT MULT: CPT | Mod: 26

## 2022-03-14 RX ADMIN — PANTOPRAZOLE SODIUM 40 MILLIGRAM(S): 20 TABLET, DELAYED RELEASE ORAL at 05:46

## 2022-03-14 RX ADMIN — HEPARIN SODIUM 5000 UNIT(S): 5000 INJECTION INTRAVENOUS; SUBCUTANEOUS at 13:29

## 2022-03-14 RX ADMIN — Medication 667 MILLIGRAM(S): at 17:34

## 2022-03-14 RX ADMIN — Medication 10 MILLIGRAM(S): at 05:45

## 2022-03-14 RX ADMIN — Medication 650 MILLIGRAM(S): at 13:29

## 2022-03-14 RX ADMIN — Medication 81 MILLIGRAM(S): at 11:29

## 2022-03-14 RX ADMIN — Medication 650 MILLIGRAM(S): at 05:45

## 2022-03-14 RX ADMIN — PREGABALIN 1000 MICROGRAM(S): 225 CAPSULE ORAL at 11:30

## 2022-03-14 RX ADMIN — Medication 10 MILLIGRAM(S): at 13:29

## 2022-03-14 RX ADMIN — Medication 10 MILLIGRAM(S): at 21:17

## 2022-03-14 RX ADMIN — Medication 40 MILLIGRAM(S): at 17:34

## 2022-03-14 RX ADMIN — HEPARIN SODIUM 5000 UNIT(S): 5000 INJECTION INTRAVENOUS; SUBCUTANEOUS at 05:44

## 2022-03-14 RX ADMIN — CARVEDILOL PHOSPHATE 6.25 MILLIGRAM(S): 80 CAPSULE, EXTENDED RELEASE ORAL at 17:35

## 2022-03-14 RX ADMIN — Medication 75 MICROGRAM(S): at 05:45

## 2022-03-14 RX ADMIN — CARVEDILOL PHOSPHATE 6.25 MILLIGRAM(S): 80 CAPSULE, EXTENDED RELEASE ORAL at 05:45

## 2022-03-14 RX ADMIN — GABAPENTIN 300 MILLIGRAM(S): 400 CAPSULE ORAL at 11:29

## 2022-03-14 RX ADMIN — HEPARIN SODIUM 5000 UNIT(S): 5000 INJECTION INTRAVENOUS; SUBCUTANEOUS at 22:19

## 2022-03-14 RX ADMIN — Medication 667 MILLIGRAM(S): at 12:42

## 2022-03-14 RX ADMIN — Medication 1: at 09:50

## 2022-03-14 RX ADMIN — Medication 40 MILLIGRAM(S): at 05:46

## 2022-03-14 RX ADMIN — ATORVASTATIN CALCIUM 20 MILLIGRAM(S): 80 TABLET, FILM COATED ORAL at 21:17

## 2022-03-14 RX ADMIN — Medication 1 MILLIGRAM(S): at 11:30

## 2022-03-14 RX ADMIN — INSULIN GLARGINE 10 UNIT(S): 100 INJECTION, SOLUTION SUBCUTANEOUS at 22:19

## 2022-03-14 RX ADMIN — Medication 1: at 17:33

## 2022-03-14 RX ADMIN — Medication 90 MILLIGRAM(S): at 05:46

## 2022-03-14 RX ADMIN — Medication 650 MILLIGRAM(S): at 21:17

## 2022-03-14 RX ADMIN — Medication 1: at 12:43

## 2022-03-14 NOTE — PROGRESS NOTE ADULT - SUBJECTIVE AND OBJECTIVE BOX
Family reporting pt felt intermittent CP prior to coming to hospital  Saturating 97-99% on RA    Vital Signs Last 24 Hrs  T(C): 36.6 (14 Mar 2022 05:00), Max: 36.7 (13 Mar 2022 17:45)  T(F): 97.9 (14 Mar 2022 05:00), Max: 98.1 (13 Mar 2022 21:00)  HR: 60 (14 Mar 2022 05:00) (60 - 65)  BP: 138/51 (14 Mar 2022 05:00) (116/62 - 138/51)  BP(mean): --  RR: 18 (14 Mar 2022 05:00) (18 - 18)  SpO2: 99% (14 Mar 2022 05:00) (97% - 99%)    I&O's Summary      Gen: NAD  Head: NCAT, EOMI  Lungs: rales rt base > lt base; no wheezes  Heart: RRR, nl S1/S2, no murmurs  Abd: soft, NTND, NABS  Neuro: A+O x 3  Exts: no LE edema b/l      LABS:                        8.6    6.11  )-----------( 158      ( 14 Mar 2022 08:07 )             26.6     03-14    136  |  99  |  84<H>  ----------------------------<  94  4.3   |  19<L>  |  3.88<H>    Ca    8.2<L>      14 Mar 2022 08:07  Phos  5.8     03-14  Mg     2.60     03-14        CAPILLARY BLOOD GLUCOSE      POCT Blood Glucose.: 193 mg/dL (14 Mar 2022 09:47)  POCT Blood Glucose.: 130 mg/dL (13 Mar 2022 21:03)  POCT Blood Glucose.: 171 mg/dL (13 Mar 2022 17:23)  POCT Blood Glucose.: 156 mg/dL (13 Mar 2022 12:38)            RADIOLOGY & ADDITIONAL TESTS:    Imaging Personally Reviewed:  [x] YES  [ ] NO    Will obtain old records:  [ ] YES  [x] NO

## 2022-03-14 NOTE — PROGRESS NOTE ADULT - SUBJECTIVE AND OBJECTIVE BOX
[facility] [place] [facility]   MD ANA KAPADIA MD RUORU WONG, PA    TEL:  FROM 9 AM to 5 PM ---OFFICE: [phone]    FROM 5 PM - 9 AM PLEASE CALL ANSWERING SERVICE: [phone]    RENAL FOLLOW UP NOTE--Date of Service 03-14-22 @ 12:04  --------------------------------------------------------------------------------  HPI:  Pt seen and examined at bedside.       PAST HISTORY  --------------------------------------------------------------------------------  No significant changes to PMH, PSH, FHx, SHx, unless otherwise noted    ALLERGIES & MEDICATIONS  --------------------------------------------------------------------------------  Allergies    No Known Allergies    Intolerances      Standing Inpatient Medications  aspirin enteric coated 81 milliGRAM(s) Oral daily  atorvastatin 20 milliGRAM(s) Oral at bedtime  calcium acetate 667 milliGRAM(s) Oral three times a day with meals  carvedilol 6.25 milliGRAM(s) Oral every 12 hours  cyanocobalamin 1000 MICROGram(s) Oral daily  dextrose 40% Gel 15 Gram(s) Oral once  dextrose 5%. 1000 milliLiter(s) IV Continuous <Continuous>  dextrose 5%. 1000 milliLiter(s) IV Continuous <Continuous>  dextrose 50% Injectable 25 Gram(s) IV Push once  dextrose 50% Injectable 12.5 Gram(s) IV Push once  dextrose 50% Injectable 25 Gram(s) IV Push once  folic acid 1 milliGRAM(s) Oral daily  furosemide   Injectable 40 milliGRAM(s) IV Push two times a day  gabapentin 300 milliGRAM(s) Oral daily  glucagon  Injectable 1 milliGRAM(s) IntraMuscular once  heparin   Injectable 5000 Unit(s) SubCutaneous three times a day  hydrALAZINE 10 milliGRAM(s) Oral three times a day  insulin glargine Injectable (LANTUS) 10 Unit(s) SubCutaneous at bedtime  insulin lispro (ADMELOG) corrective regimen sliding scale   SubCutaneous three times a day before meals  insulin lispro (ADMELOG) corrective regimen sliding scale   SubCutaneous at bedtime  levothyroxine 75 MICROGram(s) Oral daily  NIFEdipine XL 90 milliGRAM(s) Oral daily  pantoprazole    Tablet 40 milliGRAM(s) Oral before breakfast  sodium bicarbonate 650 milliGRAM(s) Oral three times a day    PRN Inpatient Medications  acetaminophen     Tablet .. 650 milliGRAM(s) Oral every 6 hours PRN      REVIEW OF SYSTEMS  --------------------------------------------------------------------------------  General: no fever  MSK: + edema     VITALS/PHYSICAL EXAM  --------------------------------------------------------------------------------  T(C): 36.6 (03-14-22 @ 05:00), Max: 36.7 (03-13-22 @ 17:45)  HR: 60 (03-14-22 @ 05:00) (60 - 65)  BP: 138/51 (03-14-22 @ 05:00) (116/62 - 138/51)  RR: 18 (03-14-22 @ 05:00) (18 - 18)  SpO2: 99% (03-14-22 @ 05:00) (97% - 99%)  Wt(kg): --        Physical Exam:  	Gen: NAD  	HEENT: MMM  	Pulm: CTA B/L  	CV: S1S2  	Abd: Soft, +BS  	Ext: + LE edema B/L                      Neuro: Awake   	Skin: Warm and Dry   	Vascular access: NO HD catheter            no  early  LABS/STUDIES  --------------------------------------------------------------------------------              8.6    6.11  >-----------<  158      [03-14-22 @ 08:07]              26.6     136  |  99  |  84  ----------------------------<  94      [03-14-22 @ 08:07]  4.3   |  19  |  3.88        Ca     8.2     [03-14-22 @ 08:07]      Mg     2.60     [03-14-22 @ 08:07]      Phos  5.8     [03-14-22 @ 08:07]            Creatinine Trend:  SCr 3.88 [03-14 @ 08:07]  SCr 3.75 [03-13 @ 08:14]  SCr 3.97 [03-12 @ 07:42]  SCr 3.78 [03-11 @ 07:51]  SCr 3.76 [03-10 @ 21:25]    Urinalysis - [03-09-22 @ 18:16]      Color Light Yellow / Appearance Clear / SG 1.010 / pH 6.5      Gluc Negative / Ketone Negative  / Bili Negative / Urobili <2 mg/dL       Blood Negative / Protein 100 mg/dL / Leuk Est Small / Nitrite Negative      RBC 1 / WBC 5 / Hyaline 0 / Gran  / Sq Epi  / Non Sq Epi 1 / Bacteria Moderate    Urine Creatinine 51      [03-11-22 @ 05:52]  Urine Sodium 76      [03-11-22 @ 05:52]  Urine Potassium 22.6      [03-11-22 @ 05:52]  Urine Osmolality 371      [03-11-22 @ 05:52]

## 2022-03-14 NOTE — PROGRESS NOTE ADULT - SUBJECTIVE AND OBJECTIVE BOX
Gary Morrison MD  Interventional Cardiology / Advance Heart Failure and Cardiac Transplant Specialist  [place] Office : [phone] [place] [address] [place]. [address]  Tel:   [place] Office : [address] [facility] [place] [address]  Tel: [phone]      Subjective/Overnight events: Pt is lying in bed comfortable not in distress, no chest pains no SOB no palpitations  	  MEDICATIONS:  aspirin enteric coated 81 milliGRAM(s) Oral daily  carvedilol 6.25 milliGRAM(s) Oral every 12 hours  furosemide   Injectable 40 milliGRAM(s) IV Push two times a day  heparin   Injectable 5000 Unit(s) SubCutaneous three times a day  hydrALAZINE 10 milliGRAM(s) Oral three times a day  NIFEdipine XL 90 milliGRAM(s) Oral daily        acetaminophen     Tablet .. 650 milliGRAM(s) Oral every 6 hours PRN  gabapentin 300 milliGRAM(s) Oral daily    pantoprazole    Tablet 40 milliGRAM(s) Oral before breakfast    atorvastatin 20 milliGRAM(s) Oral at bedtime  dextrose 40% Gel 15 Gram(s) Oral once  dextrose 50% Injectable 12.5 Gram(s) IV Push once  dextrose 50% Injectable 25 Gram(s) IV Push once  dextrose 50% Injectable 25 Gram(s) IV Push once  glucagon  Injectable 1 milliGRAM(s) IntraMuscular once  insulin glargine Injectable (LANTUS) 10 Unit(s) SubCutaneous at bedtime  insulin lispro (ADMELOG) corrective regimen sliding scale   SubCutaneous three times a day before meals  insulin lispro (ADMELOG) corrective regimen sliding scale   SubCutaneous at bedtime  levothyroxine 75 MICROGram(s) Oral daily    calcium acetate 667 milliGRAM(s) Oral three times a day with meals  cyanocobalamin 1000 MICROGram(s) Oral daily  dextrose 5%. 1000 milliLiter(s) IV Continuous <Continuous>  dextrose 5%. 1000 milliLiter(s) IV Continuous <Continuous>  folic acid 1 milliGRAM(s) Oral daily  sodium bicarbonate 650 milliGRAM(s) Oral three times a day      PAST MEDICAL/SURGICAL HISTORY  PAST MEDICAL & SURGICAL HISTORY:  HTN (hypertension)    HLD (hyperlipidemia)        SOCIAL HISTORY: Substance Use (street drugs): ( x ) never used  (  ) other:    FAMILY HISTORY:  No pertinent family history in first degree relatives          PHYSICAL EXAM:  T(C): 36.6 (03-14-22 @ 05:00), Max: 36.7 (03-13-22 @ 17:45)  HR: 60 (03-14-22 @ 05:00) (60 - 65)  BP: 138/51 (03-14-22 @ 05:00) (116/62 - 138/51)  RR: 18 (03-14-22 @ 05:00) (18 - 18)  SpO2: 99% (03-14-22 @ 05:00) (97% - 99%)  Wt(kg): --  I&O's Summary        GENERAL: NAD  EYES: EOMI, PERRLA, conjunctiva and sclera clear  ENMT: No tonsillar erythema, exudates, or enlargement  Cardiovascular: Normal S1 S2, No JVD, No murmurs, No edema  Respiratory: b/l basal creps 	  Gastrointestinal:  Soft, Non-tender, + BS	  Extremities: mild LE edema                                   8.6    6.11  )-----------( 158      ( 14 Mar 2022 08:07 )             26.6     03-14    136  |  99  |  84<H>  ----------------------------<  94  4.3   |  19<L>  |  3.88<H>    Ca    8.2<L>      14 Mar 2022 08:07  Phos  5.8     03-14  Mg     2.60     03-14      proBNP:   Lipid Profile:   HgA1c:   TSH:     Consultant(s) Notes Reviewed:  [x ] YES  [ ] NO    Care Discussed with Consultants/Other Providers [ x] YES  [ ] NO    Imaging Personally Reviewed independently:  [x] YES  [ ] NO    All labs, radiologic studies, vitals, orders and medications list reviewed. Patient is seen and examined at bedside. Case discussed with medical team.

## 2022-03-15 ENCOUNTER — TRANSCRIPTION ENCOUNTER (OUTPATIENT)
Age: 67
End: 2022-03-15

## 2022-03-15 LAB
ALBUMIN SERPL ELPH-MCNC: 3.4 G/DL — SIGNIFICANT CHANGE UP (ref 3.3–5)
ALP SERPL-CCNC: 103 U/L — SIGNIFICANT CHANGE UP (ref 40–120)
ALT FLD-CCNC: 82 U/L — HIGH (ref 4–33)
ANION GAP SERPL CALC-SCNC: 16 MMOL/L — HIGH (ref 7–14)
AST SERPL-CCNC: 81 U/L — HIGH (ref 4–32)
BILIRUB SERPL-MCNC: 0.3 MG/DL — SIGNIFICANT CHANGE UP (ref 0.2–1.2)
BUN SERPL-MCNC: 84 MG/DL — HIGH (ref 7–23)
CALCIUM SERPL-MCNC: 8 MG/DL — LOW (ref 8.4–10.5)
CHLORIDE SERPL-SCNC: 101 MMOL/L — SIGNIFICANT CHANGE UP (ref 98–107)
CO2 SERPL-SCNC: 19 MMOL/L — LOW (ref 22–31)
CREAT SERPL-MCNC: 3.99 MG/DL — HIGH (ref 0.5–1.3)
EGFR: 12 ML/MIN/1.73M2 — LOW
GLUCOSE BLDC GLUCOMTR-MCNC: 124 MG/DL — HIGH (ref 70–99)
GLUCOSE BLDC GLUCOMTR-MCNC: 137 MG/DL — HIGH (ref 70–99)
GLUCOSE BLDC GLUCOMTR-MCNC: 142 MG/DL — HIGH (ref 70–99)
GLUCOSE BLDC GLUCOMTR-MCNC: 170 MG/DL — HIGH (ref 70–99)
GLUCOSE SERPL-MCNC: 131 MG/DL — HIGH (ref 70–99)
HCT VFR BLD CALC: 24.1 % — LOW (ref 34.5–45)
HGB BLD-MCNC: 7.8 G/DL — LOW (ref 11.5–15.5)
MAGNESIUM SERPL-MCNC: 2.3 MG/DL — SIGNIFICANT CHANGE UP (ref 1.6–2.6)
MCHC RBC-ENTMCNC: 29.1 PG — SIGNIFICANT CHANGE UP (ref 27–34)
MCHC RBC-ENTMCNC: 32.4 GM/DL — SIGNIFICANT CHANGE UP (ref 32–36)
MCV RBC AUTO: 89.9 FL — SIGNIFICANT CHANGE UP (ref 80–100)
NRBC # BLD: 0 /100 WBCS — SIGNIFICANT CHANGE UP
NRBC # FLD: 0 K/UL — SIGNIFICANT CHANGE UP
PHOSPHATE SERPL-MCNC: 6.1 MG/DL — HIGH (ref 2.5–4.5)
PLATELET # BLD AUTO: 131 K/UL — LOW (ref 150–400)
POTASSIUM SERPL-MCNC: 4.1 MMOL/L — SIGNIFICANT CHANGE UP (ref 3.5–5.3)
POTASSIUM SERPL-SCNC: 4.1 MMOL/L — SIGNIFICANT CHANGE UP (ref 3.5–5.3)
PROT SERPL-MCNC: 6.8 G/DL — SIGNIFICANT CHANGE UP (ref 6–8.3)
RBC # BLD: 2.68 M/UL — LOW (ref 3.8–5.2)
RBC # FLD: 14.4 % — SIGNIFICANT CHANGE UP (ref 10.3–14.5)
SODIUM SERPL-SCNC: 136 MMOL/L — SIGNIFICANT CHANGE UP (ref 135–145)
WBC # BLD: 5.45 K/UL — SIGNIFICANT CHANGE UP (ref 3.8–10.5)
WBC # FLD AUTO: 5.45 K/UL — SIGNIFICANT CHANGE UP (ref 3.8–10.5)

## 2022-03-15 RX ORDER — FUROSEMIDE 40 MG
40 TABLET ORAL
Refills: 0 | Status: DISCONTINUED | OUTPATIENT
Start: 2022-03-16 | End: 2022-03-16

## 2022-03-15 RX ORDER — FUROSEMIDE 40 MG
40 TABLET ORAL ONCE
Refills: 0 | Status: COMPLETED | OUTPATIENT
Start: 2022-03-15 | End: 2022-03-15

## 2022-03-15 RX ORDER — CALCIUM ACETATE 667 MG
1334 TABLET ORAL
Refills: 0 | Status: DISCONTINUED | OUTPATIENT
Start: 2022-03-15 | End: 2022-03-18

## 2022-03-15 RX ADMIN — Medication 40 MILLIGRAM(S): at 05:11

## 2022-03-15 RX ADMIN — ATORVASTATIN CALCIUM 20 MILLIGRAM(S): 80 TABLET, FILM COATED ORAL at 21:39

## 2022-03-15 RX ADMIN — Medication 10 MILLIGRAM(S): at 05:11

## 2022-03-15 RX ADMIN — HEPARIN SODIUM 5000 UNIT(S): 5000 INJECTION INTRAVENOUS; SUBCUTANEOUS at 14:09

## 2022-03-15 RX ADMIN — CARVEDILOL PHOSPHATE 6.25 MILLIGRAM(S): 80 CAPSULE, EXTENDED RELEASE ORAL at 05:11

## 2022-03-15 RX ADMIN — Medication 650 MILLIGRAM(S): at 05:11

## 2022-03-15 RX ADMIN — Medication 1334 MILLIGRAM(S): at 17:45

## 2022-03-15 RX ADMIN — Medication 75 MICROGRAM(S): at 05:12

## 2022-03-15 RX ADMIN — Medication 650 MILLIGRAM(S): at 21:39

## 2022-03-15 RX ADMIN — PANTOPRAZOLE SODIUM 40 MILLIGRAM(S): 20 TABLET, DELAYED RELEASE ORAL at 05:11

## 2022-03-15 RX ADMIN — INSULIN GLARGINE 10 UNIT(S): 100 INJECTION, SOLUTION SUBCUTANEOUS at 22:41

## 2022-03-15 RX ADMIN — Medication 1 MILLIGRAM(S): at 11:54

## 2022-03-15 RX ADMIN — Medication 81 MILLIGRAM(S): at 11:53

## 2022-03-15 RX ADMIN — Medication 1: at 12:55

## 2022-03-15 RX ADMIN — CARVEDILOL PHOSPHATE 6.25 MILLIGRAM(S): 80 CAPSULE, EXTENDED RELEASE ORAL at 17:46

## 2022-03-15 RX ADMIN — HEPARIN SODIUM 5000 UNIT(S): 5000 INJECTION INTRAVENOUS; SUBCUTANEOUS at 05:11

## 2022-03-15 RX ADMIN — HEPARIN SODIUM 5000 UNIT(S): 5000 INJECTION INTRAVENOUS; SUBCUTANEOUS at 22:41

## 2022-03-15 RX ADMIN — PREGABALIN 1000 MICROGRAM(S): 225 CAPSULE ORAL at 11:54

## 2022-03-15 RX ADMIN — Medication 90 MILLIGRAM(S): at 05:11

## 2022-03-15 RX ADMIN — Medication 40 MILLIGRAM(S): at 17:46

## 2022-03-15 RX ADMIN — Medication 650 MILLIGRAM(S): at 14:10

## 2022-03-15 RX ADMIN — Medication 10 MILLIGRAM(S): at 14:09

## 2022-03-15 RX ADMIN — Medication 667 MILLIGRAM(S): at 08:46

## 2022-03-15 RX ADMIN — Medication 10 MILLIGRAM(S): at 21:39

## 2022-03-15 RX ADMIN — Medication 667 MILLIGRAM(S): at 11:54

## 2022-03-15 RX ADMIN — GABAPENTIN 300 MILLIGRAM(S): 400 CAPSULE ORAL at 11:54

## 2022-03-15 NOTE — PROGRESS NOTE ADULT - SUBJECTIVE AND OBJECTIVE BOX
[facility] [place] [facility]   MD ANA KAPADIA MD RUORU WONG, PA    TEL:  FROM 9 AM to 5 PM ---OFFICE: [phone]    FROM 5 PM - 9 AM PLEASE CALL ANSWERING SERVICE: [phone]    RENAL FOLLOW UP NOTE--Date of Service 03-15-22 @ 12:32  --------------------------------------------------------------------------------  HPI:      Pt seen and examined at bedside.        PAST HISTORY  --------------------------------------------------------------------------------  No significant changes to PMH, PSH, FHx, SHx, unless otherwise noted    ALLERGIES & MEDICATIONS  --------------------------------------------------------------------------------  Allergies    No Known Allergies    Intolerances      Standing Inpatient Medications  aspirin enteric coated 81 milliGRAM(s) Oral daily  atorvastatin 20 milliGRAM(s) Oral at bedtime  calcium acetate 667 milliGRAM(s) Oral three times a day with meals  carvedilol 6.25 milliGRAM(s) Oral every 12 hours  cyanocobalamin 1000 MICROGram(s) Oral daily  dextrose 40% Gel 15 Gram(s) Oral once  dextrose 5%. 1000 milliLiter(s) IV Continuous <Continuous>  dextrose 5%. 1000 milliLiter(s) IV Continuous <Continuous>  dextrose 50% Injectable 25 Gram(s) IV Push once  dextrose 50% Injectable 12.5 Gram(s) IV Push once  dextrose 50% Injectable 25 Gram(s) IV Push once  folic acid 1 milliGRAM(s) Oral daily  furosemide   Injectable 40 milliGRAM(s) IV Push two times a day  gabapentin 300 milliGRAM(s) Oral daily  glucagon  Injectable 1 milliGRAM(s) IntraMuscular once  heparin   Injectable 5000 Unit(s) SubCutaneous three times a day  hydrALAZINE 10 milliGRAM(s) Oral three times a day  insulin glargine Injectable (LANTUS) 10 Unit(s) SubCutaneous at bedtime  insulin lispro (ADMELOG) corrective regimen sliding scale   SubCutaneous three times a day before meals  insulin lispro (ADMELOG) corrective regimen sliding scale   SubCutaneous at bedtime  levothyroxine 75 MICROGram(s) Oral daily  NIFEdipine XL 90 milliGRAM(s) Oral daily  pantoprazole    Tablet 40 milliGRAM(s) Oral before breakfast  sodium bicarbonate 650 milliGRAM(s) Oral three times a day    PRN Inpatient Medications  acetaminophen     Tablet .. 650 milliGRAM(s) Oral every 6 hours PRN      REVIEW OF SYSTEMS  --------------------------------------------------------------------------------  General: no fever  MSK: improving edema     VITALS/PHYSICAL EXAM  --------------------------------------------------------------------------------  T(C): 36.7 (03-15-22 @ 05:05), Max: 36.7 (03-14-22 @ 21:15)  HR: 67 (03-15-22 @ 05:05) (62 - 68)  BP: 137/60 (03-15-22 @ 05:05) (103/60 - 137/60)  RR: 18 (03-15-22 @ 05:05) (18 - 18)  SpO2: 98% (03-15-22 @ 05:05) (98% - 100%)  Wt(kg): --        Physical Exam:  	Gen: NAD  	HEENT: MMM  	Pulm: CTA B/L  	CV: S1S2  	Abd: Soft, +BS  	Ext: improving LE edema B/L                      Neuro: Awake   	Skin: Warm and Dry   	Vascular access: NO HD catheter            no early  LABS/STUDIES  --------------------------------------------------------------------------------              7.8    5.45  >-----------<  131      [03-15-22 @ 08:16]              24.1     136  |  101  |  84  ----------------------------<  131      [03-15-22 @ 08:16]  4.1   |  19  |  3.99        Ca     8.0     [03-15-22 @ 08:16]      Mg     2.30     [03-15-22 @ 08:25]      Phos  6.1     [03-15-22 @ 08:25]    TPro  6.8  /  Alb  3.4  /  TBili  0.3  /  DBili  x   /  AST  81  /  ALT  82  /  AlkPhos  103  [03-15-22 @ 08:16]          Creatinine Trend:  SCr 3.99 [03-15 @ 08:16]  SCr 3.90 [03-14 @ 18:16]  SCr 3.88 [03-14 @ 08:07]  SCr 3.75 [03-13 @ 08:14]  SCr 3.97 [03-12 @ 07:42]    Urinalysis - [03-09-22 @ 18:16]      Color Light Yellow / Appearance Clear / SG 1.010 / pH 6.5      Gluc Negative / Ketone Negative  / Bili Negative / Urobili <2 mg/dL       Blood Negative / Protein 100 mg/dL / Leuk Est Small / Nitrite Negative      RBC 1 / WBC 5 / Hyaline 0 / Gran  / Sq Epi  / Non Sq Epi 1 / Bacteria Moderate    Urine Creatinine 51      [03-11-22 @ 05:52]  Urine Sodium 76      [03-11-22 @ 05:52]  Urine Potassium 22.6      [03-11-22 @ 05:52]  Urine Osmolality 371      [03-11-22 @ 05:52]

## 2022-03-15 NOTE — DISCHARGE NOTE PROVIDER - HOSPITAL COURSE
67 y/o F with HTN, HLD, DM, CKD, hypothyroidism presented from nephrology office for worsening creatinine and hyperkalemia on labs.     Acute on chronic kidney failure w/hyperkalemia   - Hyperkalemia resolved via  - Hyperphosphatemia treated with PO4 binders, increased to ___  - On bicarb   - renal U/S without hydronephrosis  - avoid nephrotoxins  - ?progression of diabetic nephropathy?  - ?element of cardiorenal disease?  - outpt vasculitis workup (-)  - no evidence for nephrotic syndrome  - monitor BMP daily while on diuretics  - appreciate nephrology    Pleural effusion rt   - BNP - 3164  - TTE 3/11/22 - severe pulm HTN, Stage I diastolic dysfunction, nl LV systolic function, grossly nl RV systolic fxn  Started on lasix 40 mg IV bid 3/11/21 --> transitioned to PO     Chest pain.   - TTE 3/11/22 - severe pulm HTN, Stage I diastolic dysfunction, nl LV systolic function, grossly nl RV systolic fxn  - Nuclear stress test 3/14/22 - negative  - cardiology cleared patient   -Burning, non-exertional.  Appears c/w GERD, low suspicion for angina  - will give trial of Protonix, Maalox.      On_________, discussed with __________, patient is medically cleared and optimized for discharge today. All medications were reviewed with attending, and sent to mutually agreed upon pharmacy. 65 y/o F with HTN, HLD, DM, CKD, hypothyroidism presented from nephrology office for worsening creatinine and hyperkalemia on labs. Patient found to have acute on chronic kidney failure with hyperkalemia and hyperphosphatemia. Patient was placed on ___ which resolved the hyperkalemia. Placed on ___ which resolved the hyperphosphatemia. Additionally, the patient was acidotic, which resolved with administration of bicarb. Patient had a renal u/s which did not demonstrate hydronephrosis. Nephrology evaluated the patient and did not find any evidence for nephrotic syndrome, and an outpatient vasculitis workup was negative. Patient was also having some burning, non exertional chest pain during the hospitalization in the setting of increased BNP (3164), so an ECHO was ordered on 3/11/12, which demonstrated Severe Pulmonary HTN, Stage I diastolic dysfunction, nl LV systolic function, grossly nl RV systolic function. Protonic and Maalox were effective in resolving chest pain. In the setting of pulmonary HTN, elements of cardiorenal disease were suspected, and IV lasix was used to remove excess fluid. When Lasix was transitioned to PO, renal function began to worsen, so the lasix was restarted as IV. Renal injury resolution _______    On_________, discussed with __________, patient is medically cleared and optimized for discharge today. All medications were reviewed with attending, and sent to mutually agreed upon pharmacy. 65 y/o Female, with a PmHx of HTN, HLD, DM, CKD, Hypothyroidism, presents to the [facility] ED from nephrology office for worsening creatinine and hyperkalemia on labs.  Pt and daughter report that pt has had facial swelling, and dyspnea for the past few days.  Pt also has had mild burning midline chest pain for a few days that is non exertional.   She also reports some upper back pain that is not related to chest pain.  Pt reports no palpitations, leg swelling, fever, chills cough or other new symptoms. Admitted to medicine.    On admission:    1. Acute on Chronic CKD  Bun/Cr: 79/3.82          COVID neg          UA - mod bacteria, small leuks  UCX - ESBL            BNP: 3164  - hyperkalemia resolved   - trend creatinine  3/9 Renal US - no hydronephrosis, gallbladder wall edema  - Renal c/s Dr. Singleton following  - continue bicarb  - transition lasix to PO    2. Chest pain  EKG: NSR @ 76  - Burning, non-exertional.  Appears c/w GERD, low suspicion for angina  - will give trial of Protonix, Maalox.  3/11 Echo - EF 55%, Mitral annular calcification, otherwise normal mitral  valve. Minimal mitral regurgitation. Calcified trileaflet aortic valve with normal opening. Minimal aortic regurgitation. Normal left ventricular internal dimensions and wall thicknesses. Endocardium not well visualized; grossly normal left ventricular systolic function. Mild diastolic dysfunction (Stage I). The right ventricle is not well visualized; grossly normal right ventricular systolic function. Normal tricuspid valve.  Mild-moderate tricuspid regurgitation. Estimated pulmonary artery systolic pressure equals 82mm Hg, assuming right atrial pressure equals 10  mm Hg, consistent with severe pulmonary hypertension. *** No previous Echo exam.  3/14 Stress Test - Myocardial Perfusion SPECT results are normal.  Review of raw data shows: The study is of good technical quality.  The left ventricle was normal in size. Normal myocardial perfusion scan, with no evidence of infarction or inducible ischemia. Post-stress gated wall motion analysis was performed (LVEF > 70%;LVEDV = 63 ml.), revealing normal LV function. There were no segmental wall motion abnormalities. RV function appeared normal.  - Cardio c/s Dr. Morrison following    3. Essential Hypertension  - monitor bp  - continue hydralazine, nifedipine, coreg.    3. Type 2 diabetes mellitus treated with insulin.   - monitor fs, continue lantus & ISS  - hold Novolog 70/30   - gabapentin    4. Hypothyroidism  - monitor TFT's  - continue synthroid    Pt comfortable at this time and is now medically cleared for discharge home as per Dr. Quintanilla. Outpatient follow up with Dr. Todd (Nephrologist). Will need to check his electrolytes in 1 week.    Reviewed discharge medications with patient; All new medications requiring new prescription sent to pharmacy of patients choice. Reviewed need for prescription from previous home medications and new prescriptions sent if requested. Patient in agreement and understands. 67 y/o Female, with a PmHx of HTN, HLD, DM, CKD, Hypothyroidism, presents to the [facility] ED from nephrology office for worsening creatinine and hyperkalemia on labs.  Pt and daughter report that pt has had facial swelling, and dyspnea for the past few days.  Pt also has had mild burning midline chest pain for a few days that is non exertional.   She also reports some upper back pain that is not related to chest pain.  Pt reports no palpitations, leg swelling, fever, chills cough or other new symptoms. Admitted to medicine.    On admission:    1. Acute on Chronic CKD  Bun/Cr: 79/3.82          COVID neg          UA - mod bacteria, small leuks  UCX - ESBL            BNP: 3164  - hyperkalemia resolved   - trend creatinine  3/9 Renal US - no hydronephrosis, gallbladder wall edema  - Renal c/s Dr. Singleton following  - continue bicarb  - transition lasix to PO    2. Chest pain  EKG: NSR @ 76  - Burning, non-exertional.  Appears c/w GERD, low suspicion for angina  - will give trial of Protonix, Maalox.  3/11 Echo - EF 55%, Mitral annular calcification, otherwise normal mitral  valve. Minimal mitral regurgitation. Calcified trileaflet aortic valve with normal opening. Minimal aortic regurgitation. Normal left ventricular internal dimensions and wall thicknesses. Endocardium not well visualized; grossly normal left ventricular systolic function. Mild diastolic dysfunction (Stage I). The right ventricle is not well visualized; grossly normal right ventricular systolic function. Normal tricuspid valve.  Mild-moderate tricuspid regurgitation. Estimated pulmonary artery systolic pressure equals 82mm Hg, assuming right atrial pressure equals 10  mm Hg, consistent with severe pulmonary hypertension. *** No previous Echo exam.  3/14 Stress Test - Myocardial Perfusion SPECT results are normal.  Review of raw data shows: The study is of good technical quality.  The left ventricle was normal in size. Normal myocardial perfusion scan, with no evidence of infarction or inducible ischemia. Post-stress gated wall motion analysis was performed (LVEF > 70%;LVEDV = 63 ml.), revealing normal LV function. There were no segmental wall motion abnormalities. RV function appeared normal.  - Cardio c/s Dr. Morrison following    3. Essential Hypertension  - monitor bp  - continue hydralazine, nifedipine, coreg.    3. Type 2 diabetes mellitus treated with insulin.   - monitor fs, continue lantus & ISS  - hold Novolog 70/30   - gabapentin    4. Hypothyroidism  - monitor TFT's  - continue synthroid    Pt comfortable at this time and is now medically cleared for discharge home as per Dr. Quintanilla. Outpatient follow up with Dr. Todd (Nephrologist). Will need to check his electrolytes in 1 week.    Reviewed discharge medications with patient; All new medications requiring new prescription sent to pharmacy of patients choice. Reviewed need for prescription from previous home medications and new prescriptions sent if requested. Patient in agreement and understands.     Attending Addendum:  Patient seen and examined by me on the discharge day. Medications reviewed.  All questions answered in details. Follow up plan explained.  More than 30 mins were spent evaluating patient and coordinating care for discharge.  Discharge summary sent to pt's primary care physician at [facility].

## 2022-03-15 NOTE — PROGRESS NOTE ADULT - SUBJECTIVE AND OBJECTIVE BOX
NO acute SOB  Saturating 97-99% on RA  (-) nuclear stress yesterday    Vital Signs Last 24 Hrs  T(C): 36.7 (15 Mar 2022 05:05), Max: 36.7 (14 Mar 2022 21:15)  T(F): 98 (15 Mar 2022 05:05), Max: 98.1 (14 Mar 2022 21:15)  HR: 67 (15 Mar 2022 05:05) (62 - 68)  BP: 137/60 (15 Mar 2022 05:05) (103/60 - 137/60)  BP(mean): --  RR: 18 (15 Mar 2022 05:05) (18 - 18)  SpO2: 98% (15 Mar 2022 05:05) (98% - 100%)    I&O's Summary      Gen: NAD  Head: NCAT, EOMI  Lungs: decreased BS @ both bases; no wheezes  Heart: RRR, nl S1/S2, no murmurs  Abd: soft, NTND, NABS  Neuro: A+O x 3  Exts: no LE edema b/l    LABS:                        7.8    5.45  )-----------( 131      ( 15 Mar 2022 08:16 )             24.1     03-15    136  |  101  |  84<H>  ----------------------------<  131<H>  4.1   |  19<L>  |  3.99<H>    Ca    8.0<L>      15 Mar 2022 08:16  Phos  5.7     03-14  Mg     2.40     03-14    TPro  6.8  /  Alb  3.4  /  TBili  0.3  /  DBili  x   /  AST  81<H>  /  ALT  82<H>  /  AlkPhos  103  03-15      CAPILLARY BLOOD GLUCOSE      POCT Blood Glucose.: 124 mg/dL (15 Mar 2022 08:39)  POCT Blood Glucose.: 163 mg/dL (14 Mar 2022 22:08)  POCT Blood Glucose.: 168 mg/dL (14 Mar 2022 17:31)  POCT Blood Glucose.: 178 mg/dL (14 Mar 2022 12:25)            RADIOLOGY & ADDITIONAL TESTS:    Imaging Personally Reviewed:  [x] YES  [ ] NO    Will obtain old records:  [ ] YES  [x] NO

## 2022-03-15 NOTE — PROGRESS NOTE ADULT - SUBJECTIVE AND OBJECTIVE BOX
Gary Morrison MD  Interventional Cardiology / Advance Heart Failure and Cardiac Transplant Specialist  [place] Office : [address] [place] [address] [place]. [address]  Tel:   [place] Office : [address] [facility] [place] [address]  Tel: [phone]      Subjective/Overnight events: Pt is lying in bed comfortable not in distress, no chest pains no SOB no palpitations  	  MEDICATIONS:  aspirin enteric coated 81 milliGRAM(s) Oral daily  carvedilol 6.25 milliGRAM(s) Oral every 12 hours  furosemide   Injectable 40 milliGRAM(s) IV Push two times a day  heparin   Injectable 5000 Unit(s) SubCutaneous three times a day  hydrALAZINE 10 milliGRAM(s) Oral three times a day  NIFEdipine XL 90 milliGRAM(s) Oral daily        acetaminophen     Tablet .. 650 milliGRAM(s) Oral every 6 hours PRN  gabapentin 300 milliGRAM(s) Oral daily    pantoprazole    Tablet 40 milliGRAM(s) Oral before breakfast    atorvastatin 20 milliGRAM(s) Oral at bedtime  dextrose 40% Gel 15 Gram(s) Oral once  dextrose 50% Injectable 25 Gram(s) IV Push once  dextrose 50% Injectable 12.5 Gram(s) IV Push once  dextrose 50% Injectable 25 Gram(s) IV Push once  glucagon  Injectable 1 milliGRAM(s) IntraMuscular once  insulin glargine Injectable (LANTUS) 10 Unit(s) SubCutaneous at bedtime  insulin lispro (ADMELOG) corrective regimen sliding scale   SubCutaneous three times a day before meals  insulin lispro (ADMELOG) corrective regimen sliding scale   SubCutaneous at bedtime  levothyroxine 75 MICROGram(s) Oral daily    calcium acetate 667 milliGRAM(s) Oral three times a day with meals  cyanocobalamin 1000 MICROGram(s) Oral daily  dextrose 5%. 1000 milliLiter(s) IV Continuous <Continuous>  dextrose 5%. 1000 milliLiter(s) IV Continuous <Continuous>  folic acid 1 milliGRAM(s) Oral daily  sodium bicarbonate 650 milliGRAM(s) Oral three times a day      PAST MEDICAL/SURGICAL HISTORY  PAST MEDICAL & SURGICAL HISTORY:  HTN (hypertension)    HLD (hyperlipidemia)        SOCIAL HISTORY: Substance Use (street drugs): ( x ) never used  (  ) other:    FAMILY HISTORY:  No pertinent family history in first degree relatives          PHYSICAL EXAM:  T(C): 36.7 (03-15-22 @ 05:05), Max: 36.7 (03-14-22 @ 21:15)  HR: 67 (03-15-22 @ 05:05) (62 - 68)  BP: 137/60 (03-15-22 @ 05:05) (103/60 - 137/60)  RR: 18 (03-15-22 @ 05:05) (18 - 18)  SpO2: 98% (03-15-22 @ 05:05) (98% - 100%)  Wt(kg): --  I&O's Summary          GENERAL: NAD  EYES: EOMI, PERRLA, conjunctiva and sclera clear  ENMT: No tonsillar erythema, exudates, or enlargement  Cardiovascular: Normal S1 S2, No JVD, No murmurs, No edema  Respiratory: b/l clear	  Gastrointestinal:  Soft, Non-tender, + BS	  Extremities: mild LE edema                                     7.8    5.45  )-----------( 131      ( 15 Mar 2022 08:16 )             24.1     03-15    136  |  101  |  84<H>  ----------------------------<  131<H>  4.1   |  19<L>  |  3.99<H>    Ca    8.0<L>      15 Mar 2022 08:16  Phos  5.7     03-14  Mg     2.40     03-14    TPro  6.8  /  Alb  3.4  /  TBili  0.3  /  DBili  x   /  AST  81<H>  /  ALT  82<H>  /  AlkPhos  103  03-15    proBNP:   Lipid Profile:   HgA1c:   TSH:     Consultant(s) Notes Reviewed:  [x ] YES  [ ] NO    Care Discussed with Consultants/Other Providers [ x] YES  [ ] NO    Imaging Personally Reviewed independently:  [x] YES  [ ] NO    All labs, radiologic studies, vitals, orders and medications list reviewed. Patient is seen and examined at bedside. Case discussed with medical team.

## 2022-03-15 NOTE — DISCHARGE NOTE PROVIDER - NSDCMRMEDTOKEN_GEN_ALL_CORE_FT
aspirin 81 mg oral delayed release tablet: 1 tab(s) orally once a day  atorvastatin 20 mg oral tablet: 1 tab(s) orally once a day  carvedilol 6.25 mg oral tablet: 1 tab(s) orally 2 times a day  famotidine 20 mg oral tablet: 1 tab(s) orally once a day  folic acid 1 mg oral tablet: 1 tab(s) orally once a day  furosemide 40 mg oral tablet: 1 tab(s) orally once a day  gabapentin 300 mg oral capsule: 1 cap(s) orally once a day  hydrALAZINE 10 mg oral tablet: 1 tab(s) orally 3 times a day  Lantus Solostar Pen 100 units/mL subcutaneous solution: 10 unit(s) subcutaneous once a day (at bedtime)  levothyroxine 75 mcg (0.075 mg) oral tablet: 1 tab(s) orally once a day  loratadine 10 mg oral tablet: 1 tab(s) orally once a day  NIFEdipine 90 mg oral tablet, extended release: 1 tab(s) orally once a day  NovoLOG Mix 70/30 subcutaneous suspension: 20 unit(s) subcutaneous once a day  sodium bicarbonate 650 mg oral tablet: 1 tab(s) orally 3 times a day  Trulicity Pen: subcutaneous once a week  Vitamin B12 1000 mcg oral tablet: 1 tab(s) orally once a day   aspirin 81 mg oral delayed release tablet: 1 tab(s) orally once a day  atorvastatin 20 mg oral tablet: 1 tab(s) orally once a day  calcium acetate 667 mg oral tablet: 2 tab(s) orally 3 times a day (with meals)  carvedilol 6.25 mg oral tablet: 1 tab(s) orally 2 times a day  folic acid 1 mg oral tablet: 1 tab(s) orally once a day  furosemide 40 mg oral tablet: 1.5 tab(s) orally 2 times a day   gabapentin 300 mg oral capsule: 1 cap(s) orally once a day  hydrALAZINE 10 mg oral tablet: 1 tab(s) orally 3 times a day  Lantus Solostar Pen 100 units/mL subcutaneous solution: 10 unit(s) subcutaneous once a day (at bedtime)  levothyroxine 75 mcg (0.075 mg) oral tablet: 1 tab(s) orally once a day  loratadine 10 mg oral tablet: 1 tab(s) orally once a day  NIFEdipine 90 mg oral tablet, extended release: 1 tab(s) orally once a day  NovoLOG Mix 70/30 subcutaneous suspension: 20 unit(s) subcutaneous once a day  pantoprazole 40 mg oral delayed release tablet: 1 tab(s) orally once a day (before a meal)  sodium bicarbonate 650 mg oral tablet: 1 tab(s) orally 3 times a day  Trulicity Pen: subcutaneous once a week  Vitamin B12 1000 mcg oral tablet: 1 tab(s) orally once a day

## 2022-03-15 NOTE — DISCHARGE NOTE PROVIDER - CARE PROVIDER_API CALL
Avi Todd  INTERNAL MEDICINE  [address] [place] [address]  Phone: [phone]  Fax: [phone]  Follow Up Time:     Gary Morrison  CARDIOVASCULAR DISEASE  [address] [facility] [place] [address]  Phone: [phone]  Fax: [phone]  Follow Up Time:

## 2022-03-15 NOTE — DISCHARGE NOTE PROVIDER - NSDCCPCAREPLAN_GEN_ALL_CORE_FT
PRINCIPAL DISCHARGE DIAGNOSIS  Diagnosis: Acute renal failure  Assessment and Plan of Treatment:       SECONDARY DISCHARGE DIAGNOSES  Diagnosis: Essential hypertension  Assessment and Plan of Treatment:     Diagnosis: Type 2 diabetes mellitus treated with insulin  Assessment and Plan of Treatment:     Diagnosis: Hypothyroidism  Assessment and Plan of Treatment:      PRINCIPAL DISCHARGE DIAGNOSIS  Diagnosis: Acute renal failure  Assessment and Plan of Treatment:       SECONDARY DISCHARGE DIAGNOSES  Diagnosis: Essential hypertension  Assessment and Plan of Treatment:     Diagnosis: Type 2 diabetes mellitus treated with insulin  Assessment and Plan of Treatment:     Diagnosis: Hypothyroidism  Assessment and Plan of Treatment:     Diagnosis: Pulmonary HTN  Assessment and Plan of Treatment:      PRINCIPAL DISCHARGE DIAGNOSIS  Diagnosis: Acute renal failure  Assessment and Plan of Treatment: To prevent shortness of breath, fluid overload, and electrolytes imbalance, and to slow down worsening kidney disease.   Continue blood pressure, cholesterol and diabetic medications. Goal of hemoglobin A1C (HgbA1C) < 7%.  Avoid nephrotoxic drugs such as nonsteroidal anti-inflammatory agents (NSAIDs).   Please follow up with your nephrologist to monitor your kidney function, continue with low protein and potassium diet.   Follow up with Dr. Todd (Nephrologist) in 1 week to monitor your electrolytes.      SECONDARY DISCHARGE DIAGNOSES  Diagnosis: Essential hypertension  Assessment and Plan of Treatment: To maintain a normal blood pressure to prevent heart attack, stroke and renal failure.   Low sodium and fat diet, continue anti-hypertensive medications, and follow up with primary care physician.    Diagnosis: Type 2 diabetes mellitus treated with insulin  Assessment and Plan of Treatment: To maintain a normal blood glucose level and HgA1C level < 5.7 and to prevent diabetic complications such as uncontrolled diabetes, diabetic coma, blindness, renal failure, and amputations.   Monitor finger sticks pre-meal and bedtime, low salt, fat and carbohydrate diet, minimize glucose intake.  Exercise daily for at least 30 minutes and weight loss.  Follow up with primary care physician and endocrinologist for routine Hemoglobin A1C checks and management.  Follow up with your ophthalmologist for routine yearly vision exams.    Diagnosis: Hypothyroidism  Assessment and Plan of Treatment: Continue Synthroid as prescribed. Follow up with your Endocrinologist and Primary Care doctor.

## 2022-03-15 NOTE — DISCHARGE NOTE PROVIDER - NSDCFUADDAPPT_GEN_ALL_CORE_FT
Will need to schedule an appointment with Dr. Todd (Nephrologist) for 1 week to check your kidney function and electrolytes.

## 2022-03-16 LAB
ALBUMIN SERPL ELPH-MCNC: 3.7 G/DL — SIGNIFICANT CHANGE UP (ref 3.3–5)
ALP SERPL-CCNC: 110 U/L — SIGNIFICANT CHANGE UP (ref 40–120)
ALT FLD-CCNC: 102 U/L — HIGH (ref 4–33)
ANION GAP SERPL CALC-SCNC: 15 MMOL/L — HIGH (ref 7–14)
AST SERPL-CCNC: 87 U/L — HIGH (ref 4–32)
BILIRUB SERPL-MCNC: 0.3 MG/DL — SIGNIFICANT CHANGE UP (ref 0.2–1.2)
BUN SERPL-MCNC: 95 MG/DL — HIGH (ref 7–23)
CALCIUM SERPL-MCNC: 8.6 MG/DL — SIGNIFICANT CHANGE UP (ref 8.4–10.5)
CHLORIDE SERPL-SCNC: 104 MMOL/L — SIGNIFICANT CHANGE UP (ref 98–107)
CO2 SERPL-SCNC: 20 MMOL/L — LOW (ref 22–31)
CREAT ?TM UR-MCNC: 80 MG/DL — SIGNIFICANT CHANGE UP
CREAT SERPL-MCNC: 4.15 MG/DL — HIGH (ref 0.5–1.3)
EGFR: 11 ML/MIN/1.73M2 — LOW
GLUCOSE BLDC GLUCOMTR-MCNC: 118 MG/DL — HIGH (ref 70–99)
GLUCOSE BLDC GLUCOMTR-MCNC: 146 MG/DL — HIGH (ref 70–99)
GLUCOSE BLDC GLUCOMTR-MCNC: 174 MG/DL — HIGH (ref 70–99)
GLUCOSE BLDC GLUCOMTR-MCNC: 181 MG/DL — HIGH (ref 70–99)
GLUCOSE SERPL-MCNC: 130 MG/DL — HIGH (ref 70–99)
HCT VFR BLD CALC: 27 % — LOW (ref 34.5–45)
HGB BLD-MCNC: 8.5 G/DL — LOW (ref 11.5–15.5)
MAGNESIUM SERPL-MCNC: 2.3 MG/DL — SIGNIFICANT CHANGE UP (ref 1.6–2.6)
MCHC RBC-ENTMCNC: 28.4 PG — SIGNIFICANT CHANGE UP (ref 27–34)
MCHC RBC-ENTMCNC: 31.5 GM/DL — LOW (ref 32–36)
MCV RBC AUTO: 90.3 FL — SIGNIFICANT CHANGE UP (ref 80–100)
NRBC # BLD: 0 /100 WBCS — SIGNIFICANT CHANGE UP
NRBC # FLD: 0 K/UL — SIGNIFICANT CHANGE UP
PHOSPHATE SERPL-MCNC: 5.6 MG/DL — HIGH (ref 2.5–4.5)
PLATELET # BLD AUTO: 141 K/UL — LOW (ref 150–400)
POTASSIUM SERPL-MCNC: 4 MMOL/L — SIGNIFICANT CHANGE UP (ref 3.5–5.3)
POTASSIUM SERPL-SCNC: 4 MMOL/L — SIGNIFICANT CHANGE UP (ref 3.5–5.3)
PROT SERPL-MCNC: 7.7 G/DL — SIGNIFICANT CHANGE UP (ref 6–8.3)
RBC # BLD: 2.99 M/UL — LOW (ref 3.8–5.2)
RBC # FLD: 14.4 % — SIGNIFICANT CHANGE UP (ref 10.3–14.5)
SODIUM SERPL-SCNC: 139 MMOL/L — SIGNIFICANT CHANGE UP (ref 135–145)
UUN UR-MCNC: 667 MG/DL — SIGNIFICANT CHANGE UP
WBC # BLD: 5.9 K/UL — SIGNIFICANT CHANGE UP (ref 3.8–10.5)
WBC # FLD AUTO: 5.9 K/UL — SIGNIFICANT CHANGE UP (ref 3.8–10.5)

## 2022-03-16 RX ORDER — FUROSEMIDE 40 MG
40 TABLET ORAL
Refills: 0 | Status: DISCONTINUED | OUTPATIENT
Start: 2022-03-16 | End: 2022-03-18

## 2022-03-16 RX ADMIN — Medication 1 MILLIGRAM(S): at 13:06

## 2022-03-16 RX ADMIN — Medication 650 MILLIGRAM(S): at 12:59

## 2022-03-16 RX ADMIN — HEPARIN SODIUM 5000 UNIT(S): 5000 INJECTION INTRAVENOUS; SUBCUTANEOUS at 05:11

## 2022-03-16 RX ADMIN — Medication 1334 MILLIGRAM(S): at 17:36

## 2022-03-16 RX ADMIN — PREGABALIN 1000 MICROGRAM(S): 225 CAPSULE ORAL at 13:05

## 2022-03-16 RX ADMIN — ATORVASTATIN CALCIUM 20 MILLIGRAM(S): 80 TABLET, FILM COATED ORAL at 22:16

## 2022-03-16 RX ADMIN — INSULIN GLARGINE 10 UNIT(S): 100 INJECTION, SOLUTION SUBCUTANEOUS at 22:13

## 2022-03-16 RX ADMIN — GABAPENTIN 300 MILLIGRAM(S): 400 CAPSULE ORAL at 12:59

## 2022-03-16 RX ADMIN — Medication 40 MILLIGRAM(S): at 05:10

## 2022-03-16 RX ADMIN — Medication 1334 MILLIGRAM(S): at 08:56

## 2022-03-16 RX ADMIN — Medication 650 MILLIGRAM(S): at 22:16

## 2022-03-16 RX ADMIN — Medication 81 MILLIGRAM(S): at 13:05

## 2022-03-16 RX ADMIN — Medication 40 MILLIGRAM(S): at 17:36

## 2022-03-16 RX ADMIN — Medication 10 MILLIGRAM(S): at 05:10

## 2022-03-16 RX ADMIN — Medication 10 MILLIGRAM(S): at 13:00

## 2022-03-16 RX ADMIN — Medication 650 MILLIGRAM(S): at 05:10

## 2022-03-16 RX ADMIN — PANTOPRAZOLE SODIUM 40 MILLIGRAM(S): 20 TABLET, DELAYED RELEASE ORAL at 05:10

## 2022-03-16 RX ADMIN — HEPARIN SODIUM 5000 UNIT(S): 5000 INJECTION INTRAVENOUS; SUBCUTANEOUS at 22:16

## 2022-03-16 RX ADMIN — Medication 75 MICROGRAM(S): at 05:10

## 2022-03-16 RX ADMIN — Medication 90 MILLIGRAM(S): at 05:10

## 2022-03-16 RX ADMIN — CARVEDILOL PHOSPHATE 6.25 MILLIGRAM(S): 80 CAPSULE, EXTENDED RELEASE ORAL at 17:36

## 2022-03-16 RX ADMIN — Medication 1334 MILLIGRAM(S): at 12:59

## 2022-03-16 RX ADMIN — Medication 1: at 12:59

## 2022-03-16 RX ADMIN — CARVEDILOL PHOSPHATE 6.25 MILLIGRAM(S): 80 CAPSULE, EXTENDED RELEASE ORAL at 05:10

## 2022-03-16 RX ADMIN — HEPARIN SODIUM 5000 UNIT(S): 5000 INJECTION INTRAVENOUS; SUBCUTANEOUS at 13:06

## 2022-03-16 RX ADMIN — Medication 10 MILLIGRAM(S): at 22:16

## 2022-03-16 NOTE — PROGRESS NOTE ADULT - SUBJECTIVE AND OBJECTIVE BOX
[facility]   MD ANA KAPADIA MD, PA  MAN VON NP    TEL:  OFFICE: [phone]    From 5pm-7am Answering Service [phone]    -- RENAL FOLLOW UP NOTE ---Date of Service 03-16-22 @ 14:29    Patient is a 66y old  Female who presents with a chief complaint of abnormal labs (16 Mar 2022 11:41)      Patient seen and examined at bedside. No chest pain/sob    VITALS:  T(F): 98.8 (03-16-22 @ 13:00), Max: 99 (03-16-22 @ 05:01)  HR: 64 (03-16-22 @ 13:00)  BP: 142/60 (03-16-22 @ 13:00)  RR: 18 (03-16-22 @ 13:00)  SpO2: 97% (03-16-22 @ 13:00)  Wt(kg): --    03-16 @ 07:01  -  03-16 @ 14:29  --------------------------------------------------------  IN: 200 mL / OUT: 0 mL / NET: 200 mL          PHYSICAL EXAM:  Constitutional: NAD  Neck: No JVD  Respiratory: CTAB, no wheezes, rales or rhonchi  Cardiovascular: S1, S2, RRR  Gastrointestinal: BS+, soft, NT/ND  Extremities: + peripheral edema    Hospital Medications:   MEDICATIONS  (STANDING):  aspirin enteric coated 81 milliGRAM(s) Oral daily  atorvastatin 20 milliGRAM(s) Oral at bedtime  calcium acetate 1334 milliGRAM(s) Oral three times a day with meals  carvedilol 6.25 milliGRAM(s) Oral every 12 hours  cyanocobalamin 1000 MICROGram(s) Oral daily  dextrose 40% Gel 15 Gram(s) Oral once  dextrose 5%. 1000 milliLiter(s) (50 mL/Hr) IV Continuous <Continuous>  dextrose 5%. 1000 milliLiter(s) (100 mL/Hr) IV Continuous <Continuous>  dextrose 50% Injectable 25 Gram(s) IV Push once  dextrose 50% Injectable 12.5 Gram(s) IV Push once  dextrose 50% Injectable 25 Gram(s) IV Push once  folic acid 1 milliGRAM(s) Oral daily  furosemide    Tablet 40 milliGRAM(s) Oral two times a day  gabapentin 300 milliGRAM(s) Oral daily  glucagon  Injectable 1 milliGRAM(s) IntraMuscular once  heparin   Injectable 5000 Unit(s) SubCutaneous three times a day  hydrALAZINE 10 milliGRAM(s) Oral three times a day  insulin glargine Injectable (LANTUS) 10 Unit(s) SubCutaneous at bedtime  insulin lispro (ADMELOG) corrective regimen sliding scale   SubCutaneous three times a day before meals  insulin lispro (ADMELOG) corrective regimen sliding scale   SubCutaneous at bedtime  levothyroxine 75 MICROGram(s) Oral daily  NIFEdipine XL 90 milliGRAM(s) Oral daily  pantoprazole    Tablet 40 milliGRAM(s) Oral before breakfast  sodium bicarbonate 650 milliGRAM(s) Oral three times a day      LABS:  03-16    139  |  104  |  95<H>  ----------------------------<  130<H>  4.0   |  20<L>  |  4.15<H>    Ca    8.6      16 Mar 2022 08:04  Phos  5.6     03-16  Mg     2.30     03-16    TPro  7.7  /  Alb  3.7  /  TBili  0.3  /  DBili      /  AST  87<H>  /  ALT  102<H>  /  AlkPhos  110  03-16    Creatinine Trend: 4.15 <--, 3.99 <--, 3.90 <--, 3.88 <--, 3.75 <--, 3.97 <--, 3.78 <--, 3.76 <--, 3.82 <--    Albumin, Serum: 3.7 g/dL (03-16 @ 08:04)  Phosphorus Level, Serum: 5.6 mg/dL (03-16 @ 08:04)                              8.5    5.90  )-----------( 141      ( 16 Mar 2022 08:04 )             27.0     Urine Studies:  Urinalysis - [03-09-22 @ 18:16]      Color Light Yellow / Appearance Clear / SG 1.010 / pH 6.5      Gluc Negative / Ketone Negative  / Bili Negative / Urobili <2 mg/dL       Blood Negative / Protein 100 mg/dL / Leuk Est Small / Nitrite Negative      RBC 1 / WBC 5 / Hyaline 0 / Gran  / Sq Epi  / Non Sq Epi 1 / Bacteria Moderate    Urine Creatinine 51      [03-11-22 @ 05:52]  Urine Sodium 76      [03-11-22 @ 05:52]  Urine Potassium 22.6      [03-11-22 @ 05:52]  Urine Osmolality 371      [03-11-22 @ 05:52]          RADIOLOGY & ADDITIONAL STUDIES:

## 2022-03-16 NOTE — CHART NOTE - NSCHARTNOTEFT_GEN_A_CORE
E. coli ESBL in urine. Patient ASx and non-toxic. Monitor off ABX per Dr. Granados.
Patient Phos level increased while on phoslo i/s/o CKD V. Discussed with nephrology, who recommended increasing PO4 binders to two tablets with meals TID from current one tablet TID. Additionally, discussed with Dr. Granados and Cardiology, and will transition IV lasix to PO lasix starting tommorrow morning (3/16). Patient currently asymptomatic. Will continue to monitor the patient.     David Mayer PA-C  Department of Medicine  Pager #[phone]
Patient with elevated Cr this morning after conversion to PO Lasix. Discussed with Nephrology and Dr. Quintanilla. Switched patient back to IV Lasix and ordered urine electrolytes. Patient evaluated at bedside and was asymptomatic. Will continue to monitor the patient.    David Mayer PA-C  Department of Medicine  Pager #[phone]

## 2022-03-16 NOTE — PROGRESS NOTE ADULT - TIME BILLING
A/P  renal function worsening   check urine lytes   if stable tomorrow can be discharged  discussed with medical attending
A/P
A/P

## 2022-03-16 NOTE — PROGRESS NOTE ADULT - SUBJECTIVE AND OBJECTIVE BOX
Gary Morrison MD  Interventional Cardiology / Advance Heart Failure and Cardiac Transplant Specialist  [place] Office : [address] [place] [address] [place]. [address]  Tel:   [place] Office : [address] [facility] [place] [address]  Tel: [phone]      Subjective/Overnight events: Pt is ambulating in room. comfortable not in distress, no chest pains no SOB no palpitations  	  MEDICATIONS:  aspirin enteric coated 81 milliGRAM(s) Oral daily  carvedilol 6.25 milliGRAM(s) Oral every 12 hours  furosemide    Tablet 40 milliGRAM(s) Oral two times a day  heparin   Injectable 5000 Unit(s) SubCutaneous three times a day  hydrALAZINE 10 milliGRAM(s) Oral three times a day  NIFEdipine XL 90 milliGRAM(s) Oral daily        acetaminophen     Tablet .. 650 milliGRAM(s) Oral every 6 hours PRN  gabapentin 300 milliGRAM(s) Oral daily    pantoprazole    Tablet 40 milliGRAM(s) Oral before breakfast    atorvastatin 20 milliGRAM(s) Oral at bedtime  dextrose 40% Gel 15 Gram(s) Oral once  dextrose 50% Injectable 25 Gram(s) IV Push once  dextrose 50% Injectable 12.5 Gram(s) IV Push once  dextrose 50% Injectable 25 Gram(s) IV Push once  glucagon  Injectable 1 milliGRAM(s) IntraMuscular once  insulin glargine Injectable (LANTUS) 10 Unit(s) SubCutaneous at bedtime  insulin lispro (ADMELOG) corrective regimen sliding scale   SubCutaneous three times a day before meals  insulin lispro (ADMELOG) corrective regimen sliding scale   SubCutaneous at bedtime  levothyroxine 75 MICROGram(s) Oral daily    calcium acetate 1334 milliGRAM(s) Oral three times a day with meals  cyanocobalamin 1000 MICROGram(s) Oral daily  dextrose 5%. 1000 milliLiter(s) IV Continuous <Continuous>  dextrose 5%. 1000 milliLiter(s) IV Continuous <Continuous>  folic acid 1 milliGRAM(s) Oral daily  sodium bicarbonate 650 milliGRAM(s) Oral three times a day      PAST MEDICAL/SURGICAL HISTORY  PAST MEDICAL & SURGICAL HISTORY:  HTN (hypertension)    HLD (hyperlipidemia)        SOCIAL HISTORY: Substance Use (street drugs): ( x ) never used  (  ) other:    FAMILY HISTORY:  No pertinent family history in first degree relatives        REVIEW OF SYSTEMS:  CONSTITUTIONAL: No fever, weight loss, or fatigue  EYES: No eye pain, visual disturbances, or discharge  ENMT:  No difficulty hearing, tinnitus, vertigo; No sinus or throat pain  BREASTS: No pain, masses, or nipple discharge  GASTROINTESTINAL: No abdominal or epigastric pain. No nausea, vomiting, or hematemesis; No diarrhea or constipation. No melena or hematochezia.  GENITOURINARY: No dysuria, frequency, hematuria, or incontinence  NEUROLOGICAL: No headaches, memory loss, loss of strength, numbness, or tremors  ENDOCRINE: No heat or cold intolerance; No hair loss  MUSCULOSKELETAL: No joint pain or swelling; No muscle, back, or extremity pain  PSYCHIATRIC: No depression, anxiety, mood swings, or difficulty sleeping  HEME/LYMPH: No easy bruising, or bleeding gums  All others negative    PHYSICAL EXAM:  T(C): 37.2 (03-16-22 @ 05:01), Max: 37.2 (03-16-22 @ 05:01)  HR: 65 (03-16-22 @ 05:01) (65 - 69)  BP: 144/61 (03-16-22 @ 05:01) (122/60 - 144/61)  RR: 18 (03-16-22 @ 05:01) (18 - 18)  SpO2: 98% (03-16-22 @ 05:01) (98% - 100%)  Wt(kg): --  I&O's Summary        GENERAL: NAD  EYES: EOMI, PERRLA, conjunctiva and sclera clear  ENMT: No tonsillar erythema, exudates, or enlargement; Moist mucous membranes, Good dentition, No lesions  Cardiovascular: Normal S1 S2, No JVD, No murmurs, No edema  Respiratory: Lungs clear to auscultation	  Gastrointestinal:  Soft, Non-tender, + BS	  Extremities: Normal range of motion, No clubbing, cyanosis or edema  LYMPH: No lymphadenopathy noted  NERVOUS SYSTEM:  Alert & Oriented X3, Good concentration; Motor Strength 5/5 B/L upper and lower extremities; DTRs 2+ intact and symmetric                                    8.5    5.90  )-----------( 141      ( 16 Mar 2022 08:04 )             27.0     03-16    139  |  104  |  95<H>  ----------------------------<  130<H>  4.0   |  20<L>  |  4.15<H>    Ca    8.6      16 Mar 2022 08:04  Phos  5.6     03-16  Mg     2.30     03-16    TPro  7.7  /  Alb  3.7  /  TBili  0.3  /  DBili  x   /  AST  87<H>  /  ALT  102<H>  /  AlkPhos  110  03-16    proBNP:   Lipid Profile:   HgA1c:   TSH:     Consultant(s) Notes Reviewed:  [x ] YES  [ ] NO    Care Discussed with Consultants/Other Providers [ x] YES  [ ] NO    Imaging Personally Reviewed independently:  [x] YES  [ ] NO    All labs, radiologic studies, vitals, orders and medications list reviewed. Patient is seen and examined at bedside. Case discussed with medical team.         Gary Morrison MD  Interventional Cardiology / Advance Heart Failure and Cardiac Transplant Specialist  [place] Office : [address] [place] [address] [place]. [address]  Tel:   [place] Office : [address] [facility] [place] [address]  Tel: [phone]      Subjective/Overnight events: Pt is ambulating in room. comfortable not in distress, no chest pains no SOB no palpitations  	  MEDICATIONS:  aspirin enteric coated 81 milliGRAM(s) Oral daily  carvedilol 6.25 milliGRAM(s) Oral every 12 hours  furosemide    Tablet 40 milliGRAM(s) Oral two times a day  heparin   Injectable 5000 Unit(s) SubCutaneous three times a day  hydrALAZINE 10 milliGRAM(s) Oral three times a day  NIFEdipine XL 90 milliGRAM(s) Oral daily        acetaminophen     Tablet .. 650 milliGRAM(s) Oral every 6 hours PRN  gabapentin 300 milliGRAM(s) Oral daily    pantoprazole    Tablet 40 milliGRAM(s) Oral before breakfast    atorvastatin 20 milliGRAM(s) Oral at bedtime  dextrose 40% Gel 15 Gram(s) Oral once  dextrose 50% Injectable 25 Gram(s) IV Push once  dextrose 50% Injectable 12.5 Gram(s) IV Push once  dextrose 50% Injectable 25 Gram(s) IV Push once  glucagon  Injectable 1 milliGRAM(s) IntraMuscular once  insulin glargine Injectable (LANTUS) 10 Unit(s) SubCutaneous at bedtime  insulin lispro (ADMELOG) corrective regimen sliding scale   SubCutaneous three times a day before meals  insulin lispro (ADMELOG) corrective regimen sliding scale   SubCutaneous at bedtime  levothyroxine 75 MICROGram(s) Oral daily    calcium acetate 1334 milliGRAM(s) Oral three times a day with meals  cyanocobalamin 1000 MICROGram(s) Oral daily  dextrose 5%. 1000 milliLiter(s) IV Continuous <Continuous>  dextrose 5%. 1000 milliLiter(s) IV Continuous <Continuous>  folic acid 1 milliGRAM(s) Oral daily  sodium bicarbonate 650 milliGRAM(s) Oral three times a day      PAST MEDICAL/SURGICAL HISTORY  PAST MEDICAL & SURGICAL HISTORY:  HTN (hypertension)    HLD (hyperlipidemia)        SOCIAL HISTORY: Substance Use (street drugs): ( x ) never used  (  ) other:    FAMILY HISTORY:  No pertinent family history in first degree relatives          PHYSICAL EXAM:  T(C): 37.2 (03-16-22 @ 05:01), Max: 37.2 (03-16-22 @ 05:01)  HR: 65 (03-16-22 @ 05:01) (65 - 69)  BP: 144/61 (03-16-22 @ 05:01) (122/60 - 144/61)  RR: 18 (03-16-22 @ 05:01) (18 - 18)  SpO2: 98% (03-16-22 @ 05:01) (98% - 100%)  Wt(kg): --  I&O's Summary        GENERAL: NAD  EYES: EOMI, PERRLA, conjunctiva and sclera clear  ENMT: No tonsillar erythema, exudates, or enlargement  Cardiovascular: Normal S1 S2, No JVD, No murmurs, No edema  Respiratory: b/l clear	  Gastrointestinal:  Soft, Non-tender, + BS	  Extremities: mild LE edema                                   8.5    5.90  )-----------( 141      ( 16 Mar 2022 08:04 )             27.0     03-16    139  |  104  |  95<H>  ----------------------------<  130<H>  4.0   |  20<L>  |  4.15<H>    Ca    8.6      16 Mar 2022 08:04  Phos  5.6     03-16  Mg     2.30     03-16    TPro  7.7  /  Alb  3.7  /  TBili  0.3  /  DBili  x   /  AST  87<H>  /  ALT  102<H>  /  AlkPhos  110  03-16    proBNP:   Lipid Profile:   HgA1c:   TSH:     Consultant(s) Notes Reviewed:  [x ] YES  [ ] NO    Care Discussed with Consultants/Other Providers [ x] YES  [ ] NO    Imaging Personally Reviewed independently:  [x] YES  [ ] NO    All labs, radiologic studies, vitals, orders and medications list reviewed. Patient is seen and examined at bedside. Case discussed with medical team.

## 2022-03-16 NOTE — PROGRESS NOTE ADULT - SUBJECTIVE AND OBJECTIVE BOX
SUBJECTIVE / OVERNIGHT EVENTS:  No overnight events.  Pt feels well.  Denied cp, sob, n/v/d.  no abdominal pain. No HA/dizziness.   tele no events     --------------------------------------------------------------------------------------------  LABS:                        8.5    5.90  )-----------( 141      ( 16 Mar 2022 08:04 )             27.0     03-16    139  |  104  |  95<H>  ----------------------------<  130<H>  4.0   |  20<L>  |  4.15<H>    Ca    8.6      16 Mar 2022 08:04  Phos  5.6     03-16  Mg     2.30     03-16    TPro  7.7  /  Alb  3.7  /  TBili  0.3  /  DBili  x   /  AST  87<H>  /  ALT  102<H>  /  AlkPhos  110  03-16      CAPILLARY BLOOD GLUCOSE      POCT Blood Glucose.: 146 mg/dL (16 Mar 2022 08:55)  POCT Blood Glucose.: 137 mg/dL (15 Mar 2022 21:58)  POCT Blood Glucose.: 142 mg/dL (15 Mar 2022 17:37)  POCT Blood Glucose.: 170 mg/dL (15 Mar 2022 12:31)            RADIOLOGY & ADDITIONAL TESTS:    Imaging Personally Reviewed:  [x] YES  [ ] NO    Consultant(s) Notes Reviewed:  [x] YES  [ ] NO    MEDICATIONS  (STANDING):  aspirin enteric coated 81 milliGRAM(s) Oral daily  atorvastatin 20 milliGRAM(s) Oral at bedtime  calcium acetate 1334 milliGRAM(s) Oral three times a day with meals  carvedilol 6.25 milliGRAM(s) Oral every 12 hours  cyanocobalamin 1000 MICROGram(s) Oral daily  dextrose 40% Gel 15 Gram(s) Oral once  dextrose 5%. 1000 milliLiter(s) (50 mL/Hr) IV Continuous <Continuous>  dextrose 5%. 1000 milliLiter(s) (100 mL/Hr) IV Continuous <Continuous>  dextrose 50% Injectable 25 Gram(s) IV Push once  dextrose 50% Injectable 12.5 Gram(s) IV Push once  dextrose 50% Injectable 25 Gram(s) IV Push once  folic acid 1 milliGRAM(s) Oral daily  furosemide    Tablet 40 milliGRAM(s) Oral two times a day  gabapentin 300 milliGRAM(s) Oral daily  glucagon  Injectable 1 milliGRAM(s) IntraMuscular once  heparin   Injectable 5000 Unit(s) SubCutaneous three times a day  hydrALAZINE 10 milliGRAM(s) Oral three times a day  insulin glargine Injectable (LANTUS) 10 Unit(s) SubCutaneous at bedtime  insulin lispro (ADMELOG) corrective regimen sliding scale   SubCutaneous three times a day before meals  insulin lispro (ADMELOG) corrective regimen sliding scale   SubCutaneous at bedtime  levothyroxine 75 MICROGram(s) Oral daily  NIFEdipine XL 90 milliGRAM(s) Oral daily  pantoprazole    Tablet 40 milliGRAM(s) Oral before breakfast  sodium bicarbonate 650 milliGRAM(s) Oral three times a day    MEDICATIONS  (PRN):  acetaminophen     Tablet .. 650 milliGRAM(s) Oral every 6 hours PRN Temp greater or equal to 38C (100.4F), Mild Pain (1 - 3), Moderate Pain (4 - 6), Severe Pain (7 - 10)      Care Discussed with Consultants/Other Providers [x] YES  [ ] NO    Vital Signs Last 24 Hrs  T(C): 37.2 (16 Mar 2022 05:01), Max: 37.2 (16 Mar 2022 05:01)  T(F): 99 (16 Mar 2022 05:01), Max: 99 (16 Mar 2022 05:01)  HR: 65 (16 Mar 2022 05:01) (65 - 69)  BP: 144/61 (16 Mar 2022 05:01) (122/60 - 144/61)  BP(mean): --  RR: 18 (16 Mar 2022 05:01) (18 - 18)  SpO2: 98% (16 Mar 2022 05:01) (98% - 100%)  I&O's Summary    16 Mar 2022 07:01  -  16 Mar 2022 11:44  --------------------------------------------------------  IN: 200 mL / OUT: 0 mL / NET: 200 mL      PHYSICAL EXAM:  GENERAL: NAD, thin-elderly, comfortable on room air  HEAD:  Atraumatic, Normocephalic  EYES: EOMI, PERRLA, conjunctiva and sclera clear  NECK: Supple, No JVD  CHEST/LUNG: mild decrease breath sounds bilaterally; No wheeze   HEART: Regular rate and rhythm; No murmurs, rubs, or gallops  ABDOMEN: Soft, Nontender, Nondistended; Bowel sounds present  Neuro: AAOx3, no focal weakness   EXTREMITIES:  2+ Peripheral Pulses, No clubbing, cyanosis, trace b/l LE edema  SKIN: No rashes or lesions

## 2022-03-17 LAB
ALBUMIN SERPL ELPH-MCNC: 4.2 G/DL — SIGNIFICANT CHANGE UP (ref 3.3–5)
ALP SERPL-CCNC: 122 U/L — HIGH (ref 40–120)
ALT FLD-CCNC: 100 U/L — HIGH (ref 4–33)
ANION GAP SERPL CALC-SCNC: 16 MMOL/L — HIGH (ref 7–14)
AST SERPL-CCNC: 69 U/L — HIGH (ref 4–32)
BILIRUB SERPL-MCNC: 0.5 MG/DL — SIGNIFICANT CHANGE UP (ref 0.2–1.2)
BUN SERPL-MCNC: 92 MG/DL — HIGH (ref 7–23)
CALCIUM SERPL-MCNC: 9.1 MG/DL — SIGNIFICANT CHANGE UP (ref 8.4–10.5)
CHLORIDE SERPL-SCNC: 102 MMOL/L — SIGNIFICANT CHANGE UP (ref 98–107)
CO2 SERPL-SCNC: 21 MMOL/L — LOW (ref 22–31)
CREAT SERPL-MCNC: 3.89 MG/DL — HIGH (ref 0.5–1.3)
EGFR: 12 ML/MIN/1.73M2 — LOW
GLUCOSE BLDC GLUCOMTR-MCNC: 162 MG/DL — HIGH (ref 70–99)
GLUCOSE BLDC GLUCOMTR-MCNC: 168 MG/DL — HIGH (ref 70–99)
GLUCOSE BLDC GLUCOMTR-MCNC: 171 MG/DL — HIGH (ref 70–99)
GLUCOSE BLDC GLUCOMTR-MCNC: 204 MG/DL — HIGH (ref 70–99)
GLUCOSE SERPL-MCNC: 125 MG/DL — HIGH (ref 70–99)
HAV IGM SER-ACNC: SIGNIFICANT CHANGE UP
HBV CORE IGM SER-ACNC: SIGNIFICANT CHANGE UP
HBV SURFACE AG SER-ACNC: REACTIVE
HCT VFR BLD CALC: 29.3 % — LOW (ref 34.5–45)
HCV AB S/CO SERPL IA: 0.14 S/CO — SIGNIFICANT CHANGE UP (ref 0–0.99)
HCV AB SERPL-IMP: SIGNIFICANT CHANGE UP
HGB BLD-MCNC: 9.6 G/DL — LOW (ref 11.5–15.5)
MAGNESIUM SERPL-MCNC: 2.3 MG/DL — SIGNIFICANT CHANGE UP (ref 1.6–2.6)
MCHC RBC-ENTMCNC: 29.4 PG — SIGNIFICANT CHANGE UP (ref 27–34)
MCHC RBC-ENTMCNC: 32.8 GM/DL — SIGNIFICANT CHANGE UP (ref 32–36)
MCV RBC AUTO: 89.6 FL — SIGNIFICANT CHANGE UP (ref 80–100)
NRBC # BLD: 0 /100 WBCS — SIGNIFICANT CHANGE UP
NRBC # FLD: 0 K/UL — SIGNIFICANT CHANGE UP
PHOSPHATE SERPL-MCNC: 5.6 MG/DL — HIGH (ref 2.5–4.5)
PLATELET # BLD AUTO: 176 K/UL — SIGNIFICANT CHANGE UP (ref 150–400)
POTASSIUM SERPL-MCNC: 4.3 MMOL/L — SIGNIFICANT CHANGE UP (ref 3.5–5.3)
POTASSIUM SERPL-SCNC: 4.3 MMOL/L — SIGNIFICANT CHANGE UP (ref 3.5–5.3)
PROT SERPL-MCNC: 8.3 G/DL — SIGNIFICANT CHANGE UP (ref 6–8.3)
RBC # BLD: 3.27 M/UL — LOW (ref 3.8–5.2)
RBC # FLD: 14.6 % — HIGH (ref 10.3–14.5)
SODIUM SERPL-SCNC: 139 MMOL/L — SIGNIFICANT CHANGE UP (ref 135–145)
WBC # BLD: 6.91 K/UL — SIGNIFICANT CHANGE UP (ref 3.8–10.5)
WBC # FLD AUTO: 6.91 K/UL — SIGNIFICANT CHANGE UP (ref 3.8–10.5)

## 2022-03-17 RX ORDER — HEPARIN SODIUM 5000 [USP'U]/ML
5000 INJECTION INTRAVENOUS; SUBCUTANEOUS THREE TIMES A DAY
Refills: 0 | Status: DISCONTINUED | OUTPATIENT
Start: 2022-03-17 | End: 2022-03-18

## 2022-03-17 RX ADMIN — Medication 1334 MILLIGRAM(S): at 17:13

## 2022-03-17 RX ADMIN — CARVEDILOL PHOSPHATE 6.25 MILLIGRAM(S): 80 CAPSULE, EXTENDED RELEASE ORAL at 05:04

## 2022-03-17 RX ADMIN — INSULIN GLARGINE 10 UNIT(S): 100 INJECTION, SOLUTION SUBCUTANEOUS at 22:24

## 2022-03-17 RX ADMIN — PREGABALIN 1000 MICROGRAM(S): 225 CAPSULE ORAL at 12:19

## 2022-03-17 RX ADMIN — Medication 10 MILLIGRAM(S): at 21:44

## 2022-03-17 RX ADMIN — Medication 1: at 17:33

## 2022-03-17 RX ADMIN — Medication 40 MILLIGRAM(S): at 17:13

## 2022-03-17 RX ADMIN — Medication 650 MILLIGRAM(S): at 21:43

## 2022-03-17 RX ADMIN — Medication 650 MILLIGRAM(S): at 12:18

## 2022-03-17 RX ADMIN — Medication 10 MILLIGRAM(S): at 05:03

## 2022-03-17 RX ADMIN — Medication 40 MILLIGRAM(S): at 05:04

## 2022-03-17 RX ADMIN — ATORVASTATIN CALCIUM 20 MILLIGRAM(S): 80 TABLET, FILM COATED ORAL at 21:45

## 2022-03-17 RX ADMIN — Medication 10 MILLIGRAM(S): at 12:19

## 2022-03-17 RX ADMIN — HEPARIN SODIUM 5000 UNIT(S): 5000 INJECTION INTRAVENOUS; SUBCUTANEOUS at 21:44

## 2022-03-17 RX ADMIN — Medication 1 MILLIGRAM(S): at 12:18

## 2022-03-17 RX ADMIN — GABAPENTIN 300 MILLIGRAM(S): 400 CAPSULE ORAL at 12:18

## 2022-03-17 RX ADMIN — Medication 81 MILLIGRAM(S): at 12:18

## 2022-03-17 RX ADMIN — Medication 1: at 12:51

## 2022-03-17 RX ADMIN — Medication 75 MICROGRAM(S): at 05:04

## 2022-03-17 RX ADMIN — PANTOPRAZOLE SODIUM 40 MILLIGRAM(S): 20 TABLET, DELAYED RELEASE ORAL at 05:04

## 2022-03-17 RX ADMIN — HEPARIN SODIUM 5000 UNIT(S): 5000 INJECTION INTRAVENOUS; SUBCUTANEOUS at 05:04

## 2022-03-17 RX ADMIN — HEPARIN SODIUM 5000 UNIT(S): 5000 INJECTION INTRAVENOUS; SUBCUTANEOUS at 12:19

## 2022-03-17 RX ADMIN — Medication 650 MILLIGRAM(S): at 05:05

## 2022-03-17 RX ADMIN — Medication 90 MILLIGRAM(S): at 05:04

## 2022-03-17 RX ADMIN — Medication 1334 MILLIGRAM(S): at 12:18

## 2022-03-17 RX ADMIN — Medication 1334 MILLIGRAM(S): at 09:12

## 2022-03-17 RX ADMIN — Medication 1: at 09:12

## 2022-03-17 RX ADMIN — CARVEDILOL PHOSPHATE 6.25 MILLIGRAM(S): 80 CAPSULE, EXTENDED RELEASE ORAL at 17:13

## 2022-03-17 NOTE — PROGRESS NOTE ADULT - SUBJECTIVE AND OBJECTIVE BOX
SUBJECTIVE / OVERNIGHT EVENTS:  Pt seen and examined earlier today.   no events. comfortable.   denied pain.   no cp, no sob, no n/v/d.  no HA, no dizziness.   tele stable.  Cr better with IV Lasix.         --------------------------------------------------------------------------------------------  LABS:                        9.6    6.91  )-----------( 176      ( 17 Mar 2022 08:10 )             29.3     03-17    139  |  102  |  92<H>  ----------------------------<  125<H>  4.3   |  21<L>  |  3.89<H>    Ca    9.1      17 Mar 2022 08:10  Phos  5.6     03-17  Mg     2.30     03-17    TPro  8.3  /  Alb  4.2  /  TBili  0.5  /  DBili  x   /  AST  69<H>  /  ALT  100<H>  /  AlkPhos  122<H>  03-17      CAPILLARY BLOOD GLUCOSE      POCT Blood Glucose.: 168 mg/dL (17 Mar 2022 17:18)  POCT Blood Glucose.: 171 mg/dL (17 Mar 2022 12:35)  POCT Blood Glucose.: 162 mg/dL (17 Mar 2022 08:45)            RADIOLOGY & ADDITIONAL TESTS:    Imaging Personally Reviewed:  [x] YES  [ ] NO    Consultant(s) Notes Reviewed:  [x] YES  [ ] NO    MEDICATIONS  (STANDING):  aspirin enteric coated 81 milliGRAM(s) Oral daily  atorvastatin 20 milliGRAM(s) Oral at bedtime  calcium acetate 1334 milliGRAM(s) Oral three times a day with meals  carvedilol 6.25 milliGRAM(s) Oral every 12 hours  cyanocobalamin 1000 MICROGram(s) Oral daily  dextrose 40% Gel 15 Gram(s) Oral once  dextrose 5%. 1000 milliLiter(s) (50 mL/Hr) IV Continuous <Continuous>  dextrose 5%. 1000 milliLiter(s) (100 mL/Hr) IV Continuous <Continuous>  dextrose 50% Injectable 25 Gram(s) IV Push once  dextrose 50% Injectable 12.5 Gram(s) IV Push once  dextrose 50% Injectable 25 Gram(s) IV Push once  folic acid 1 milliGRAM(s) Oral daily  furosemide   Injectable 40 milliGRAM(s) IV Push two times a day  gabapentin 300 milliGRAM(s) Oral daily  glucagon  Injectable 1 milliGRAM(s) IntraMuscular once  heparin   Injectable 5000 Unit(s) SubCutaneous three times a day  hydrALAZINE 10 milliGRAM(s) Oral three times a day  insulin glargine Injectable (LANTUS) 10 Unit(s) SubCutaneous at bedtime  insulin lispro (ADMELOG) corrective regimen sliding scale   SubCutaneous three times a day before meals  insulin lispro (ADMELOG) corrective regimen sliding scale   SubCutaneous at bedtime  levothyroxine 75 MICROGram(s) Oral daily  NIFEdipine XL 90 milliGRAM(s) Oral daily  pantoprazole    Tablet 40 milliGRAM(s) Oral before breakfast  sodium bicarbonate 650 milliGRAM(s) Oral three times a day    MEDICATIONS  (PRN):  acetaminophen     Tablet .. 650 milliGRAM(s) Oral every 6 hours PRN Temp greater or equal to 38C (100.4F), Mild Pain (1 - 3), Moderate Pain (4 - 6), Severe Pain (7 - 10)      Care Discussed with Consultants/Other Providers [x] YES  [ ] NO    Vital Signs Last 24 Hrs  T(C): 36.6 (17 Mar 2022 12:03), Max: 36.6 (17 Mar 2022 05:00)  T(F): 97.8 (17 Mar 2022 12:03), Max: 97.8 (17 Mar 2022 05:00)  HR: 66 (17 Mar 2022 17:10) (62 - 66)  BP: 118/58 (17 Mar 2022 17:10) (113/58 - 131/60)  BP(mean): --  RR: 18 (17 Mar 2022 17:10) (18 - 18)  SpO2: 100% (17 Mar 2022 17:10) (98% - 100%)  I&O's Summary    16 Mar 2022 07:01  -  17 Mar 2022 07:00  --------------------------------------------------------  IN: 200 mL / OUT: 0 mL / NET: 200 mL    17 Mar 2022 07:01  -  17 Mar 2022 22:01  --------------------------------------------------------  IN: 600 mL / OUT: 0 mL / NET: 600 mL        PHYSICAL EXAM:  GENERAL: NAD, thin-elderly, comfortable on room air  HEAD:  Atraumatic, Normocephalic  EYES: EOMI, PERRLA, conjunctiva and sclera clear  NECK: Supple, No JVD  CHEST/LUNG: mild decrease breath sounds bilaterally; No wheeze   HEART: Regular rate and rhythm; No murmurs, rubs, or gallops  ABDOMEN: Soft, Nontender, Nondistended; Bowel sounds present  Neuro: AAOx3, no focal weakness   EXTREMITIES:  2+ Peripheral Pulses, No clubbing, cyanosis, trace b/l LE edema  SKIN: No rashes or lesions

## 2022-03-17 NOTE — PROGRESS NOTE ADULT - SUBJECTIVE AND OBJECTIVE BOX
Gary Morrison MD  Interventional Cardiology / Advance Heart Failure and Cardiac Transplant Specialist  [place] Office : [address] [place] [address] [place]. [address]  Tel:   [place] Office : [address] [facility] [place] [address]  Tel: [phone]      Subjective/Overnight events: Pt is lying in bed comfortable not in distress, no chest pains no SOB no palpitations  	  MEDICATIONS:  aspirin enteric coated 81 milliGRAM(s) Oral daily  carvedilol 6.25 milliGRAM(s) Oral every 12 hours  furosemide   Injectable 40 milliGRAM(s) IV Push two times a day  heparin   Injectable 5000 Unit(s) SubCutaneous three times a day  hydrALAZINE 10 milliGRAM(s) Oral three times a day  NIFEdipine XL 90 milliGRAM(s) Oral daily        acetaminophen     Tablet .. 650 milliGRAM(s) Oral every 6 hours PRN  gabapentin 300 milliGRAM(s) Oral daily    pantoprazole    Tablet 40 milliGRAM(s) Oral before breakfast    atorvastatin 20 milliGRAM(s) Oral at bedtime  dextrose 40% Gel 15 Gram(s) Oral once  dextrose 50% Injectable 25 Gram(s) IV Push once  dextrose 50% Injectable 12.5 Gram(s) IV Push once  dextrose 50% Injectable 25 Gram(s) IV Push once  glucagon  Injectable 1 milliGRAM(s) IntraMuscular once  insulin glargine Injectable (LANTUS) 10 Unit(s) SubCutaneous at bedtime  insulin lispro (ADMELOG) corrective regimen sliding scale   SubCutaneous three times a day before meals  insulin lispro (ADMELOG) corrective regimen sliding scale   SubCutaneous at bedtime  levothyroxine 75 MICROGram(s) Oral daily    calcium acetate 1334 milliGRAM(s) Oral three times a day with meals  cyanocobalamin 1000 MICROGram(s) Oral daily  dextrose 5%. 1000 milliLiter(s) IV Continuous <Continuous>  dextrose 5%. 1000 milliLiter(s) IV Continuous <Continuous>  folic acid 1 milliGRAM(s) Oral daily  sodium bicarbonate 650 milliGRAM(s) Oral three times a day      PAST MEDICAL/SURGICAL HISTORY  PAST MEDICAL & SURGICAL HISTORY:  HTN (hypertension)    HLD (hyperlipidemia)        SOCIAL HISTORY: Substance Use (street drugs): ( x ) never used  (  ) other:    FAMILY HISTORY:  No pertinent family history in first degree relatives        PHYSICAL EXAM:  T(C): 36.6 (03-17-22 @ 05:00), Max: 37.1 (03-16-22 @ 13:00)  HR: 63 (03-17-22 @ 05:00) (63 - 64)  BP: 131/60 (03-17-22 @ 05:00) (122/60 - 142/60)  RR: 18 (03-17-22 @ 05:00) (18 - 18)  SpO2: 98% (03-17-22 @ 05:00) (97% - 100%)  Wt(kg): --  I&O's Summary    16 Mar 2022 07:01  -  17 Mar 2022 07:00  --------------------------------------------------------  IN: 200 mL / OUT: 0 mL / NET: 200 mL    17 Mar 2022 07:01  -  17 Mar 2022 10:41  --------------------------------------------------------  IN: 200 mL / OUT: 0 mL / NET: 200 mL          GENERAL: NAD  EYES: EOMI, PERRLA, conjunctiva and sclera clear  ENMT: No tonsillar erythema, exudates, or enlargement  Cardiovascular: Normal S1 S2, No JVD, No murmurs, No edema  Respiratory: b/l clear	  Gastrointestinal:  Soft, Non-tender, + BS	  Extremities: mild LE edema                               9.6    6.91  )-----------( 176      ( 17 Mar 2022 08:10 )             29.3     03-17    139  |  102  |  92<H>  ----------------------------<  125<H>  4.3   |  21<L>  |  3.89<H>    Ca    9.1      17 Mar 2022 08:10  Phos  5.6     03-17  Mg     2.30     03-17    TPro  8.3  /  Alb  4.2  /  TBili  0.5  /  DBili  x   /  AST  69<H>  /  ALT  100<H>  /  AlkPhos  122<H>  03-17    proBNP:   Lipid Profile:   HgA1c:   TSH:     Consultant(s) Notes Reviewed:  [x ] YES  [ ] NO    Care Discussed with Consultants/Other Providers [ x] YES  [ ] NO    Imaging Personally Reviewed independently:  [x] YES  [ ] NO    All labs, radiologic studies, vitals, orders and medications list reviewed. Patient is seen and examined at bedside. Case discussed with medical team.

## 2022-03-17 NOTE — PROGRESS NOTE ADULT - SUBJECTIVE AND OBJECTIVE BOX
[facility]   MD ANA KAPADIA MD, PA INJEDUARDO VON P[place]    TEL:  OFFICE: [phone]    From 5pm-7am Answering Service [phone]    -- RENAL FOLLOW UP NOTE ---Date of Service 03-17-22 @ 13:10    Patient is a 66y old  Female who presents with a chief complaint of abnormal labs (17 Mar 2022 10:40)      Patient seen and examined at bedside. No chest pain/sob    VITALS:  T(F): 97.8 (03-17-22 @ 12:03), Max: 98.6 (03-16-22 @ 17:35)  HR: 62 (03-17-22 @ 12:03)  BP: 113/58 (03-17-22 @ 12:03)  RR: 18 (03-17-22 @ 12:03)  SpO2: 100% (03-17-22 @ 12:03)  Wt(kg): --    03-16 @ 07:01  -  03-17 @ 07:00  --------------------------------------------------------  IN: 200 mL / OUT: 0 mL / NET: 200 mL    03-17 @ 07:01  -  03-17 @ 13:10  --------------------------------------------------------  IN: 200 mL / OUT: 0 mL / NET: 200 mL        PHYSICAL EXAM:  Constitutional: NAD  Neck: No JVD  Respiratory: CTAB, no wheezes, rales or rhonchi  Cardiovascular: S1, S2, RRR  Gastrointestinal: BS+, soft, NT/ND  Extremities: + peripheral edema    Hospital Medications:   MEDICATIONS  (STANDING):  aspirin enteric coated 81 milliGRAM(s) Oral daily  atorvastatin 20 milliGRAM(s) Oral at bedtime  calcium acetate 1334 milliGRAM(s) Oral three times a day with meals  carvedilol 6.25 milliGRAM(s) Oral every 12 hours  cyanocobalamin 1000 MICROGram(s) Oral daily  dextrose 40% Gel 15 Gram(s) Oral once  dextrose 5%. 1000 milliLiter(s) (50 mL/Hr) IV Continuous <Continuous>  dextrose 5%. 1000 milliLiter(s) (100 mL/Hr) IV Continuous <Continuous>  dextrose 50% Injectable 25 Gram(s) IV Push once  dextrose 50% Injectable 12.5 Gram(s) IV Push once  dextrose 50% Injectable 25 Gram(s) IV Push once  folic acid 1 milliGRAM(s) Oral daily  furosemide   Injectable 40 milliGRAM(s) IV Push two times a day  gabapentin 300 milliGRAM(s) Oral daily  glucagon  Injectable 1 milliGRAM(s) IntraMuscular once  heparin   Injectable 5000 Unit(s) SubCutaneous three times a day  hydrALAZINE 10 milliGRAM(s) Oral three times a day  insulin glargine Injectable (LANTUS) 10 Unit(s) SubCutaneous at bedtime  insulin lispro (ADMELOG) corrective regimen sliding scale   SubCutaneous three times a day before meals  insulin lispro (ADMELOG) corrective regimen sliding scale   SubCutaneous at bedtime  levothyroxine 75 MICROGram(s) Oral daily  NIFEdipine XL 90 milliGRAM(s) Oral daily  pantoprazole    Tablet 40 milliGRAM(s) Oral before breakfast  sodium bicarbonate 650 milliGRAM(s) Oral three times a day      LABS:  03-17    139  |  102  |  92<H>  ----------------------------<  125<H>  4.3   |  21<L>  |  3.89<H>    Ca    9.1      17 Mar 2022 08:10  Phos  5.6     03-17  Mg     2.30     03-17    TPro  8.3  /  Alb  4.2  /  TBili  0.5  /  DBili      /  AST  69<H>  /  ALT  100<H>  /  AlkPhos  122<H>  03-17    Creatinine Trend: 3.89 <--, 4.15 <--, 3.99 <--, 3.90 <--, 3.88 <--, 3.75 <--, 3.97 <--, 3.78 <--, 3.76 <--    Albumin, Serum: 4.2 g/dL (03-17 @ 08:10)  Phosphorus Level, Serum: 5.6 mg/dL (03-17 @ 08:10)                              9.6    6.91  )-----------( 176      ( 17 Mar 2022 08:10 )             29.3     Urine Studies:  Urinalysis - [03-09-22 @ 18:16]      Color Light Yellow / Appearance Clear / SG 1.010 / pH 6.5      Gluc Negative / Ketone Negative  / Bili Negative / Urobili <2 mg/dL       Blood Negative / Protein 100 mg/dL / Leuk Est Small / Nitrite Negative      RBC 1 / WBC 5 / Hyaline 0 / Gran  / Sq Epi  / Non Sq Epi 1 / Bacteria Moderate    Urine Creatinine 80      [03-16-22 @ 15:58]  Urine Sodium 76      [03-11-22 @ 05:52]  Urine Urea Nitrogen 667.0      [03-16-22 @ 15:58]  Urine Potassium 22.6      [03-11-22 @ 05:52]  Urine Osmolality 371      [03-11-22 @ 05:52]          RADIOLOGY & ADDITIONAL STUDIES:

## 2022-03-18 ENCOUNTER — TRANSCRIPTION ENCOUNTER (OUTPATIENT)
Age: 67
End: 2022-03-18

## 2022-03-18 VITALS — WEIGHT: 157.85 LBS

## 2022-03-18 LAB
ALBUMIN SERPL ELPH-MCNC: 3.7 G/DL — SIGNIFICANT CHANGE UP (ref 3.3–5)
ALP SERPL-CCNC: 124 U/L — HIGH (ref 40–120)
ALT FLD-CCNC: 105 U/L — HIGH (ref 4–33)
ANION GAP SERPL CALC-SCNC: 18 MMOL/L — HIGH (ref 7–14)
AST SERPL-CCNC: 71 U/L — HIGH (ref 4–32)
BASOPHILS # BLD AUTO: 0.02 K/UL — SIGNIFICANT CHANGE UP (ref 0–0.2)
BASOPHILS NFR BLD AUTO: 0.3 % — SIGNIFICANT CHANGE UP (ref 0–2)
BILIRUB SERPL-MCNC: 0.4 MG/DL — SIGNIFICANT CHANGE UP (ref 0.2–1.2)
BUN SERPL-MCNC: 97 MG/DL — HIGH (ref 7–23)
CALCIUM SERPL-MCNC: 8.6 MG/DL — SIGNIFICANT CHANGE UP (ref 8.4–10.5)
CHLORIDE SERPL-SCNC: 103 MMOL/L — SIGNIFICANT CHANGE UP (ref 98–107)
CO2 SERPL-SCNC: 20 MMOL/L — LOW (ref 22–31)
CREAT SERPL-MCNC: 4.09 MG/DL — HIGH (ref 0.5–1.3)
EGFR: 11 ML/MIN/1.73M2 — LOW
EOSINOPHIL # BLD AUTO: 0.22 K/UL — SIGNIFICANT CHANGE UP (ref 0–0.5)
EOSINOPHIL NFR BLD AUTO: 3.5 % — SIGNIFICANT CHANGE UP (ref 0–6)
GLUCOSE BLDC GLUCOMTR-MCNC: 142 MG/DL — HIGH (ref 70–99)
GLUCOSE BLDC GLUCOMTR-MCNC: 168 MG/DL — HIGH (ref 70–99)
GLUCOSE SERPL-MCNC: 151 MG/DL — HIGH (ref 70–99)
HCT VFR BLD CALC: 25.4 % — LOW (ref 34.5–45)
HGB BLD-MCNC: 8.1 G/DL — LOW (ref 11.5–15.5)
IANC: 3.7 K/UL — SIGNIFICANT CHANGE UP (ref 1.5–8.5)
IMM GRANULOCYTES NFR BLD AUTO: 1.1 % — SIGNIFICANT CHANGE UP (ref 0–1.5)
LYMPHOCYTES # BLD AUTO: 1.46 K/UL — SIGNIFICANT CHANGE UP (ref 1–3.3)
LYMPHOCYTES # BLD AUTO: 23.5 % — SIGNIFICANT CHANGE UP (ref 13–44)
MAGNESIUM SERPL-MCNC: 2.2 MG/DL — SIGNIFICANT CHANGE UP (ref 1.6–2.6)
MCHC RBC-ENTMCNC: 29.1 PG — SIGNIFICANT CHANGE UP (ref 27–34)
MCHC RBC-ENTMCNC: 31.9 GM/DL — LOW (ref 32–36)
MCV RBC AUTO: 91.4 FL — SIGNIFICANT CHANGE UP (ref 80–100)
MONOCYTES # BLD AUTO: 0.75 K/UL — SIGNIFICANT CHANGE UP (ref 0–0.9)
MONOCYTES NFR BLD AUTO: 12.1 % — SIGNIFICANT CHANGE UP (ref 2–14)
NEUTROPHILS # BLD AUTO: 3.7 K/UL — SIGNIFICANT CHANGE UP (ref 1.8–7.4)
NEUTROPHILS NFR BLD AUTO: 59.5 % — SIGNIFICANT CHANGE UP (ref 43–77)
NRBC # BLD: 0 /100 WBCS — SIGNIFICANT CHANGE UP
NRBC # FLD: 0 K/UL — SIGNIFICANT CHANGE UP
PHOSPHATE SERPL-MCNC: 5.7 MG/DL — HIGH (ref 2.5–4.5)
PLATELET # BLD AUTO: 165 K/UL — SIGNIFICANT CHANGE UP (ref 150–400)
POTASSIUM SERPL-MCNC: 4.2 MMOL/L — SIGNIFICANT CHANGE UP (ref 3.5–5.3)
POTASSIUM SERPL-SCNC: 4.2 MMOL/L — SIGNIFICANT CHANGE UP (ref 3.5–5.3)
PROT SERPL-MCNC: 7.2 G/DL — SIGNIFICANT CHANGE UP (ref 6–8.3)
RBC # BLD: 2.78 M/UL — LOW (ref 3.8–5.2)
RBC # FLD: 14.6 % — HIGH (ref 10.3–14.5)
SODIUM SERPL-SCNC: 141 MMOL/L — SIGNIFICANT CHANGE UP (ref 135–145)
WBC # BLD: 6.22 K/UL — SIGNIFICANT CHANGE UP (ref 3.8–10.5)
WBC # FLD AUTO: 6.22 K/UL — SIGNIFICANT CHANGE UP (ref 3.8–10.5)

## 2022-03-18 RX ORDER — PANTOPRAZOLE SODIUM 20 MG/1
1 TABLET, DELAYED RELEASE ORAL
Qty: 0 | Refills: 0 | DISCHARGE
Start: 2022-03-18

## 2022-03-18 RX ORDER — FUROSEMIDE 40 MG
1 TABLET ORAL
Qty: 0 | Refills: 0 | DISCHARGE

## 2022-03-18 RX ORDER — FUROSEMIDE 40 MG
60 TABLET ORAL
Qty: 0 | Refills: 0 | DISCHARGE

## 2022-03-18 RX ORDER — FUROSEMIDE 40 MG
1.5 TABLET ORAL
Qty: 90 | Refills: 0
Start: 2022-03-18 | End: 2022-04-16

## 2022-03-18 RX ORDER — PANTOPRAZOLE SODIUM 20 MG/1
1 TABLET, DELAYED RELEASE ORAL
Qty: 30 | Refills: 0
Start: 2022-03-18 | End: 2022-04-16

## 2022-03-18 RX ORDER — CALCIUM ACETATE 667 MG
2 TABLET ORAL
Qty: 180 | Refills: 0
Start: 2022-03-18 | End: 2022-04-16

## 2022-03-18 RX ORDER — CALCIUM ACETATE 667 MG
2 TABLET ORAL
Qty: 0 | Refills: 0 | DISCHARGE
Start: 2022-03-18

## 2022-03-18 RX ORDER — FAMOTIDINE 10 MG/ML
1 INJECTION INTRAVENOUS
Qty: 0 | Refills: 0 | DISCHARGE

## 2022-03-18 RX ORDER — CALCIUM ACETATE 667 MG
1 TABLET ORAL
Qty: 0 | Refills: 0 | DISCHARGE
Start: 2022-03-18 | End: 2022-04-16

## 2022-03-18 RX ORDER — CALCIUM ACETATE 667 MG
2 TABLET ORAL
Qty: 0 | Refills: 0 | DISCHARGE
Start: 2022-03-18 | End: 2022-04-16

## 2022-03-18 RX ADMIN — Medication 650 MILLIGRAM(S): at 05:20

## 2022-03-18 RX ADMIN — Medication 1 MILLIGRAM(S): at 11:25

## 2022-03-18 RX ADMIN — HEPARIN SODIUM 5000 UNIT(S): 5000 INJECTION INTRAVENOUS; SUBCUTANEOUS at 16:08

## 2022-03-18 RX ADMIN — Medication 10 MILLIGRAM(S): at 05:21

## 2022-03-18 RX ADMIN — Medication 1334 MILLIGRAM(S): at 09:25

## 2022-03-18 RX ADMIN — HEPARIN SODIUM 5000 UNIT(S): 5000 INJECTION INTRAVENOUS; SUBCUTANEOUS at 05:20

## 2022-03-18 RX ADMIN — Medication 1: at 12:52

## 2022-03-18 RX ADMIN — Medication 10 MILLIGRAM(S): at 16:08

## 2022-03-18 RX ADMIN — PREGABALIN 1000 MICROGRAM(S): 225 CAPSULE ORAL at 11:25

## 2022-03-18 RX ADMIN — Medication 1334 MILLIGRAM(S): at 12:52

## 2022-03-18 RX ADMIN — CARVEDILOL PHOSPHATE 6.25 MILLIGRAM(S): 80 CAPSULE, EXTENDED RELEASE ORAL at 05:21

## 2022-03-18 RX ADMIN — Medication 75 MICROGRAM(S): at 05:41

## 2022-03-18 RX ADMIN — Medication 90 MILLIGRAM(S): at 05:20

## 2022-03-18 RX ADMIN — Medication 40 MILLIGRAM(S): at 05:20

## 2022-03-18 RX ADMIN — PANTOPRAZOLE SODIUM 40 MILLIGRAM(S): 20 TABLET, DELAYED RELEASE ORAL at 05:21

## 2022-03-18 RX ADMIN — GABAPENTIN 300 MILLIGRAM(S): 400 CAPSULE ORAL at 11:26

## 2022-03-18 RX ADMIN — Medication 650 MILLIGRAM(S): at 16:08

## 2022-03-18 RX ADMIN — Medication 81 MILLIGRAM(S): at 11:26

## 2022-03-18 NOTE — DIETITIAN INITIAL EVALUATION ADULT. - OTHER INFO
67 y/o F with HTN, HLD, DM2, CKD, hypothyroidism presents from nephrology office for worsening creatinine and hyperkalemia on labs.    Hospital course significant for acute on chronic kidney failure, pulm HTN.    Maltese speaking;  used ID [id number]. Pt reports good PO intake. Denies any GI issues (nausea/vomiting/diarrhea/constipation.) Denies any chewing or swallowing difficulties at this time. States she avoids beef because doctor instructed her to. Attempted to provide nutrition education on maintaining kidney health with nutrition . Pt states she wants me to write down everything she can/cannot eat; however, she only reads Maltese.

## 2022-03-18 NOTE — PROGRESS NOTE ADULT - PROVIDER SPECIALTY LIST ADULT
Cardiology
Internal Medicine
Internal Medicine
Nephrology
Cardiology
Internal Medicine
Nephrology
Cardiology
Internal Medicine
Nephrology

## 2022-03-18 NOTE — DISCHARGE NOTE NURSING/CASE MANAGEMENT/SOCIAL WORK - PATIENT PORTAL LINK FT
You can access the FollowMyHealth Patient Portal offered by [facility] by registering at the following website: [email or web address]. By joining [facility]’s FollowMyHealth portal, you will also be able to view your health information using other applications (apps) compatible with our system.

## 2022-03-18 NOTE — DIETITIAN INITIAL EVALUATION ADULT. - PERTINENT MEDS FT
MEDICATIONS  (STANDING):  aspirin enteric coated 81 milliGRAM(s) Oral daily  atorvastatin 20 milliGRAM(s) Oral at bedtime  calcium acetate 1334 milliGRAM(s) Oral three times a day with meals  carvedilol 6.25 milliGRAM(s) Oral every 12 hours  cyanocobalamin 1000 MICROGram(s) Oral daily  dextrose 40% Gel 15 Gram(s) Oral once  dextrose 5%. 1000 milliLiter(s) (50 mL/Hr) IV Continuous <Continuous>  dextrose 5%. 1000 milliLiter(s) (100 mL/Hr) IV Continuous <Continuous>  dextrose 50% Injectable 25 Gram(s) IV Push once  dextrose 50% Injectable 12.5 Gram(s) IV Push once  dextrose 50% Injectable 25 Gram(s) IV Push once  folic acid 1 milliGRAM(s) Oral daily  furosemide   Injectable 40 milliGRAM(s) IV Push two times a day  gabapentin 300 milliGRAM(s) Oral daily  glucagon  Injectable 1 milliGRAM(s) IntraMuscular once  heparin   Injectable 5000 Unit(s) SubCutaneous three times a day  hydrALAZINE 10 milliGRAM(s) Oral three times a day  insulin glargine Injectable (LANTUS) 10 Unit(s) SubCutaneous at bedtime  insulin lispro (ADMELOG) corrective regimen sliding scale   SubCutaneous three times a day before meals  insulin lispro (ADMELOG) corrective regimen sliding scale   SubCutaneous at bedtime  levothyroxine 75 MICROGram(s) Oral daily  NIFEdipine XL 90 milliGRAM(s) Oral daily  pantoprazole    Tablet 40 milliGRAM(s) Oral before breakfast  sodium bicarbonate 650 milliGRAM(s) Oral three times a day

## 2022-03-18 NOTE — PROGRESS NOTE ADULT - SUBJECTIVE AND OBJECTIVE BOX
Gary Morrison MD  Interventional Cardiology / Advance Heart Failure and Cardiac Transplant Specialist  [place] Office : [phone] [place] [address] [place]. [address]  Tel:   [place] Office : [phone] [facility] [place] [address]  Tel: [phone]       Pt is lying in bed comfortable not in distress   	  MEDICATIONS:  aspirin enteric coated 81 milliGRAM(s) Oral daily  carvedilol 6.25 milliGRAM(s) Oral every 12 hours  furosemide   Injectable 40 milliGRAM(s) IV Push two times a day  heparin   Injectable 5000 Unit(s) SubCutaneous three times a day  hydrALAZINE 10 milliGRAM(s) Oral three times a day  NIFEdipine XL 90 milliGRAM(s) Oral daily        acetaminophen     Tablet .. 650 milliGRAM(s) Oral every 6 hours PRN  gabapentin 300 milliGRAM(s) Oral daily    pantoprazole    Tablet 40 milliGRAM(s) Oral before breakfast    atorvastatin 20 milliGRAM(s) Oral at bedtime  dextrose 40% Gel 15 Gram(s) Oral once  dextrose 50% Injectable 25 Gram(s) IV Push once  dextrose 50% Injectable 12.5 Gram(s) IV Push once  dextrose 50% Injectable 25 Gram(s) IV Push once  glucagon  Injectable 1 milliGRAM(s) IntraMuscular once  insulin glargine Injectable (LANTUS) 10 Unit(s) SubCutaneous at bedtime  insulin lispro (ADMELOG) corrective regimen sliding scale   SubCutaneous three times a day before meals  insulin lispro (ADMELOG) corrective regimen sliding scale   SubCutaneous at bedtime  levothyroxine 75 MICROGram(s) Oral daily    calcium acetate 1334 milliGRAM(s) Oral three times a day with meals  cyanocobalamin 1000 MICROGram(s) Oral daily  dextrose 5%. 1000 milliLiter(s) IV Continuous <Continuous>  dextrose 5%. 1000 milliLiter(s) IV Continuous <Continuous>  folic acid 1 milliGRAM(s) Oral daily  sodium bicarbonate 650 milliGRAM(s) Oral three times a day      PAST MEDICAL/SURGICAL HISTORY  PAST MEDICAL & SURGICAL HISTORY:  HTN (hypertension)    HLD (hyperlipidemia)        SOCIAL HISTORY: Substance Use (street drugs): ( x ) never used  (  ) other:    FAMILY HISTORY:  No pertinent family history in first degree relatives            PHYSICAL EXAM:  T(C): 36.6 (03-18-22 @ 11:25), Max: 36.8 (03-18-22 @ 04:50)  HR: 62 (03-18-22 @ 11:25) (62 - 67)  BP: 101/67 (03-18-22 @ 11:25) (101/67 - 131/58)  RR: 18 (03-18-22 @ 11:25) (16 - 18)  SpO2: 96% (03-18-22 @ 11:25) (96% - 100%)  Wt(kg): --  I&O's Summary    17 Mar 2022 07:01  -  18 Mar 2022 07:00  --------------------------------------------------------  IN: 600 mL / OUT: 0 mL / NET: 600 mL          GENERAL: NAD  EYES:   PERRLA   ENMT:   Moist mucous membranes, Good dentition, No lesions  Cardiovascular: Normal S1 S2, No JVD, No murmurs, No edema  Respiratory: Lungs clear to auscultation	  Gastrointestinal:  Soft, Non-tender, + BS	  Extremities: 1+ edema                                    8.1    6.22  )-----------( 165      ( 18 Mar 2022 07:19 )             25.4     03-18    141  |  103  |  97<H>  ----------------------------<  151<H>  4.2   |  20<L>  |  4.09<H>    Ca    8.6      18 Mar 2022 07:19  Phos  5.7     03-18  Mg     2.20     03-18    TPro  7.2  /  Alb  3.7  /  TBili  0.4  /  DBili  x   /  AST  71<H>  /  ALT  105<H>  /  AlkPhos  124<H>  03-18    proBNP:   Lipid Profile:   HgA1c:   TSH:     Consultant(s) Notes Reviewed:  [x ] YES  [ ] NO    Care Discussed with Consultants/Other Providers [ x] YES  [ ] NO    Imaging Personally Reviewed independently:  [x] YES  [ ] NO    All labs, radiologic studies, vitals, orders and medications list reviewed. Patient is seen and examined at bedside. Case discussed with medical team.

## 2022-03-18 NOTE — PROGRESS NOTE ADULT - ASSESSMENT
65 y/o F with HTN, HLD, DM, CKD, hypothyroidism presents from nephrology office for worsening creatinine and hyperkalemia on labs    EKG: NSR no acute changes    1. SOB  - CXR with hazy RLL opacity   - EKG with no acute changes  - echo normal LV/RV , sever Phtn   - lasix 40 mg IV BID will discuss switching to PO    2. NISH on CKD  -hyperkalemia on admission now resolved   - f/u renal recs    3. HTN  -controlled  -c/w coreg, hydralazine and nifedipine  -continue to monitor BP    4. CP:   - Now pt c/o on and off CP, discussed with daughter   -stress with no ischemia or infarct    DVT prophylaxis  -hep subq
65 y/o F with HTN, HLD, DM, CKD, hypothyroidism presents from nephrology office for worsening creatinine and hyperkalemia on labs    EKG: NSR no acute changes    1. SOB  - CXR with hazy RLL opacity  - volume up on exam   - denies CP  - EKG with no acute changes  - echo normal LV/RV , sever Phtn   - lasix 40 mg IV BID     2. NISH on CKD  -hyperkalemia on admission now resolved   - f/u renal recs    3. HTN  -controlled  -c/w coreg, hydralazine and nifedipine  -continue to monitor BP    4. CP:   - Now pt c/o on and off CP, discussed with daughter   - will get stress      DVT prophylaxis  -hep subq  
65 y/o F with HTN, HLD, DM, CKD, hypothyroidism presents from nephrology office for worsening creatinine and hyperkalemia on labs.  Pt and daughter report that pt has had facial swelling, and dyspnea for the past few days.  Pt also has had mild burning midline chest pain for a few days that is non exertional.      NISH on CKD  baseline scr 2.5-3.0  NISH possibly cardio renal   - Diuretics per cardiology.  - MOnitor BMP   - Avoid nephrotoxics, NSIADS RCA      FLuid overload  Likely due to CHF as she is not Nephrotic.  Cardiology follow up   - lasix as per Cardiology.  - Monitor daily weights     Acidosis  on oral bicarb  monitor serum co2    Hyperphosphatemia  on  binders  low PO4 diet    Proteinuria  3.3 gm in office  vasculitis work up negative in office  sec to DM   Monitor   
65 y/o F with HTN, HLD, DM2, CKD, hypothyroidism presents from nephrology office for worsening creatinine and hyperkalemia on labs.      Problem/Plan - :  ·  Problem: Acute on chronic kidney failure w/hyperkalemia  ·  Plan: hyperkalemia resolved  - trend creatinine  - renal U/S without hydronephrosis  - avoid nephrotoxins  - continue bicarb  - ?progression of diabetic nephropathy?  - ?element of cardiorenal disease?  - check KIM, hepatitis panel, complement, ANCA  - consider IV lasix challenge if OK with nephrology     Problem/Plan - :  ·  Problem: Pleural effusion b/l  ·  Plan: Ordered BNP  - Ordered TTE  - Consider IV lasix challenge 3/10/22 if OK with nephrology    Problem/Plan - :  ·  Problem: Chest pain.   ·  Plan: Burning, non-exertional.  Appears c/w GERD, low suspicion for angina  - will give trial of Protonix, Maalox.  - TTE ordered  - appreciate cardiology    Problem/Plan - :  ·  Problem: Essential hypertension.   ·  Plan: - continue hydralazine, nifedipine, coreg.    Problem/Plan - :  ·  Problem: Type 2 diabetes mellitus treated with insulin.   ·  Plan: - continue lantus.  Hold Novolog 70/30 and give pre-meal sliding scale  - gabapentin.  - Hgb 1ac a     Problem/Plan - :  ·  Problem: Hypothyroidism.   ·  Plan: - continue synthroid.    Problem/Plan - :  ·  Problem: Elevated LFTs  ·  Plan: - ?hepatosteatosis?  If symptomatic, will get RUQ limited U/S  
65 y/o F with HTN, HLD, DM2, CKD, hypothyroidism presents from nephrology office for worsening creatinine and hyperkalemia on labs.      Problem/Plan - :  ·  Problem: Acute on chronic kidney failure w/hyperkalemia  ·  Plan: hyperkalemia resolved  - trend creatinine  - renal U/S without hydronephrosis  - avoid nephrotoxins  - continue bicarb  - ?progression of diabetic nephropathy?  - ?element of cardiorenal disease?  - check KIM, hepatitis panel, complement, ANCA  - consider IV lasix challenge if OK with nephrology     Problem/Plan - :  ·  Problem: Pleural effusion b/l  ·  Plan: Ordered BNP  - TTE 3/11/22 -->  - Consider IV lasix challenge 3/10/22 if OK with nephrology    Problem/Plan - :  ·  Problem: Chest pain.   ·  Plan: Burning, non-exertional.  Appears c/w GERD, low suspicion for angina  - will give trial of Protonix, Maalox.  - TTE 3/11/22 -->  - appreciate cardiology    Problem/Plan - :  ·  Problem: Essential hypertension.   ·  Plan: - continue hydralazine, nifedipine, coreg.    Problem/Plan - :  ·  Problem: Type 2 diabetes mellitus treated with insulin.   ·  Plan: - continue lantus.  Hold Novolog 70/30 and give pre-meal sliding scale  - gabapentin.  - Hgb 1ac a     Problem/Plan - :  ·  Problem: Hypothyroidism.   ·  Plan: - continue synthroid.    Problem/Plan - :  ·  Problem: Elevated LFTs  ·  Plan: - ?hepatosteatosis?  If symptomatic, will get RUQ limited U/S  
67 y/o F with HTN, HLD, DM, CKD, hypothyroidism presents from nephrology office for worsening creatinine and hyperkalemia on labs    EKG: NSR no acute changes    1. SOB  - CXR with hazy RLL opacity  - volume up on exam   - denies CP  - EKG with no acute changes  - echo normal LV/RV , sever Phtn   - lasix 40 mg IV BID     2. NISH on CKD  -hyperkalemia on admission now resolved   - f/u renal recs    3. HTN  -controlled  -c/w coreg, hydralazine and nifedipine  -continue to monitor BP    4. CP:   - Now pt c/o on and off CP, discussed with daughter   - will get stress        DVT prophylaxis  -hep subq
67 y/o F with HTN, HLD, DM, CKD, hypothyroidism presents from nephrology office for worsening creatinine and hyperkalemia on labs    EKG: NSR no acute changes    1. SOB  - CXR with hazy RLL opacity  - volume up on exam   - denies CP  - EKG with no acute changes  - obtain echo  - start lasix 40 mg IV BID     2. NISH on CKD  -hyperkalemia on admission now resolved   -f/u renal recs    3. HTN  -controlled  -c/w coreg, hydralazine and nifedipine  -continue to monitor BP    4. DVT prophylaxis  -hep subq
67 y/o F with HTN, HLD, DM, CKD, hypothyroidism presents from nephrology office for worsening creatinine and hyperkalemia on labs.  Pt and daughter report that pt has had facial swelling, and dyspnea for the past few days.  Pt also has had mild burning midline chest pain for a few days that is non exertional.      NISH on CKD  Her baseline Serum Cr 2.5-3.0.  NISH possibly cardio renal   -Renal function relatively stable  - Diuretics per cardiology.  - Monitor BMP   - Avoid nephrotoxics, NSIADS RCA      FLuid overload  Likely due to CHF as she is not Nephrotic.  Cardiology follow up   -stress test neg  - lasix as per Cardiology.  - Monitor daily weights     Acidosis  on oral bicarb  monitor serum co2    Hyperphosphatemia  on  binders  low PO4 diet    Proteinuria  3.3 gm in office  vasculitis work up negative in office  sec to DM   Monitor   
67 y/o F with HTN, HLD, DM2, CKD, hypothyroidism presents from nephrology office for worsening creatinine and hyperkalemia on labs.      Problem/Plan - :  ·  Problem: Acute on chronic kidney failure w/hyperkalemia  ·  Plan: hyperkalemia resolved  - daily BMP  - renal U/S without hydronephrosis  - avoid nephrotoxins  - continue bicarb  - ?progression of diabetic nephropathy?  - ?element of cardiorenal disease?  - outpt vasculitis workup (-)  - no evidence for nephrotic syndrome  - monitor BMP daily while on diuretics  - appreciate nephrology    Problem/Plan - :  ·  Problem: Pleural effusion rt   ·  Plan: BNP a 3164  - TTE 3/11/22 a severe pulm HTN, Stage I diastolic dysfunction, nl LV systolic function, grossly nl RV systolic fxn  Started on Lasix 40 mg IV bid 3/11/21 --> switched to PO Lasix 40 mg BID 3/16/22  If Cr remain stable tomorrow, dc planning. monitor volume status.     Problem/Plan - :  ·  Problem: Chest pain.   ·  Plan: - TTE 3/11/22 a severe pulm HTN, Stage I diastolic dysfunction, nl LV systolic function, grossly nl RV systolic fxn  - Nuclear stress test 3/14/22 a negative  - appreciate cardiology    Problem/Plan - :  ·  Problem: Essential hypertension.   ·  Plan: - continue hydralazine, nifedipine, coreg.    Problem/Plan - :  ·  Problem: Type 2 diabetes mellitus treated with insulin.   ·  Plan: - continue lantus.  Hold Novolog 70/30 and give pre-meal sliding scale  - gabapentin.  - Hgb 1ac a 5.7    Problem/Plan - :  ·  Problem: Hypothyroidism.   ·  Plan: - continue synthroid.    Problem/Plan - :  ·  Problem: Elevated LFTs  ·  Plan: - ?hepatosteatosis?  c/w Lipitor. stable LFTs. Alk phos normal now.   could be hepatic congestions from CHF.   If symptomatic, will get RUQ limited U/S. currently asymptomatic.  acute hepatitis profile (low suspicion)   
65 y/o F with HTN, HLD, DM, CKD, hypothyroidism presents from nephrology office for worsening creatinine and hyperkalemia on labs    EKG: NSR no acute changes    1. SOB  - CXR with hazy RLL opacity   - EKG with no acute changes  - echo normal LV/RV , sever Phtn   - c/w PO lasix     2. NISH on CKD  -hyperkalemia on admission now resolved   - f/u renal recs    3. HTN  -controlled  -c/w coreg, hydralazine and nifedipine  -continue to monitor BP    4. CP:   - Now pt c/o on and off CP, discussed with daughter   -stress with no ischemia or infarct    DVT prophylaxis  -hep subq     
65 y/o F with HTN, HLD, DM, CKD, hypothyroidism presents from nephrology office for worsening creatinine and hyperkalemia on labs.  Pt and daughter report that pt has had facial swelling, and dyspnea for the past few days.  Pt also has had mild burning midline chest pain for a few days that is non exertional.      NISH on CKD  Her baseline Serum Cr 2.5-3.0.  NISH possibly cardio renal   - Diuretics per cardiology.  - Monitor BMP   - Avoid nephrotoxics, NSIADS RCA      FLuid overload  Likely due to CHF as she is not Nephrotic.  Cardiology follow up   - lasix as per Cardiology.  - Monitor daily weights     Acidosis  on oral bicarb  monitor serum co2    Hyperphosphatemia  on  binders  low PO4 diet    Proteinuria  3.3 gm in office  vasculitis work up negative in office  sec to DM   Monitor   
65 y/o F with HTN, HLD, DM, CKD, hypothyroidism presents from nephrology office for worsening creatinine and hyperkalemia on labs.  Pt and daughter report that pt has had facial swelling, and dyspnea for the past few days.  Pt also has had mild burning midline chest pain for a few days that is non exertional.      NISH on CKD  Her baseline Serum Cr 2.5-3.0.  NISH possibly cardio renal   -Renal function worsening, likely over diuretic   fluid status improving   on iv lasix 40mg bid, recommend to switch to lasix 60mg po bid   -FEUrea 36.8%  - Monitor BMP   - Avoid nephrotoxics, NSIADS RCA    FLuid overload  Likely due to CHF as she is not Nephrotic.  Cardiology follow up   -stress test neg  - lasix as per Cardiology.  - Monitor daily weights     HTN   controlled   continue current meds   monitor BP     Acidosis  on oral bicarb  monitor serum co2    Hyperphosphatemia  on  binders  low PO4 diet    Proteinuria  3.3 gm in office  vasculitis work up negative in office  sec to DM   Monitor   
67 y/o F with HTN, HLD, DM, CKD, hypothyroidism presents from nephrology office for worsening creatinine and hyperkalemia on labs.  Pt and daughter report that pt has had facial swelling, and dyspnea for the past few days.  Pt also has had mild burning midline chest pain for a few days that is non exertional.      NISH on CKD  Her baseline Serum Cr 2.5-3.0.  NISH possibly cardio renal   -Renal function better today  -FEUrea 36.8%  - Monitor BMP   - Avoid nephrotoxics, NSIADS RCA    FLuid overload  Likely due to CHF as she is not Nephrotic.  Cardiology follow up   -stress test neg  - lasix as per Cardiology.  - Monitor daily weights     HTN   controlled   continue current meds   monitor BP     Acidosis  on oral bicarb  monitor serum co2    Hyperphosphatemia  on  binders  low PO4 diet    Proteinuria  3.3 gm in office  vasculitis work up negative in office  sec to DM   Monitor   
67 y/o F with HTN, HLD, DM, CKD, hypothyroidism presents from nephrology office for worsening creatinine and hyperkalemia on labs.  Pt and daughter report that pt has had facial swelling, and dyspnea for the past few days.  Pt also has had mild burning midline chest pain for a few days that is non exertional.      NISH on CKD  Her baseline Serum Cr 2.5-3.0.  NISH possibly cardio renal   -Renal function trending up   -may need to continue IV lasix if cardiology is ok   get urine urea, urine cr  - Monitor BMP   - Avoid nephrotoxics, NSIADS RCA    FLuid overload  Likely due to CHF as she is not Nephrotic.  Cardiology follow up   -stress test neg  - lasix as per Cardiology.  - Monitor daily weights     HTN   optimal   continue current meds   monitor BP     Acidosis  on oral bicarb  monitor serum co2    Hyperphosphatemia  on  binders  low PO4 diet    Proteinuria  3.3 gm in office  vasculitis work up negative in office  sec to DM   Monitor   
67 y/o F with HTN, HLD, DM2, CKD, hypothyroidism presents from nephrology office for worsening creatinine and hyperkalemia on labs.      Problem/Plan - :  ·  Problem: Acute on chronic kidney failure w/hyperkalemia  ·  Plan: hyperkalemia resolved  - daily BMP  - renal U/S without hydronephrosis  - avoid nephrotoxins  - continue bicarb  - ?progression of diabetic nephropathy?  - ?element of cardiorenal disease?  - outpt vasculitis workup (-)  - no evidence for nephrotic syndrome  - monitor BMP daily while on diuretics  - appreciate nephrology    Problem/Plan - :  ·  Problem: Pleural effusion rt   ·  Plan: BNP a 3164  - TTE 3/11/22 a severe pulm HTN, Stage I diastolic dysfunction, nl LV systolic function, grossly nl RV systolic fxn  Started on lasix 40 mg IV bid 3/11/21  Change to PO soon    Problem/Plan - :  ·  Problem: Chest pain.   ·  Plan: - TTE 3/11/22 a severe pulm HTN, Stage I diastolic dysfunction, nl LV systolic function, grossly nl RV systolic fxn  - Nuclear stress test 3/14/22 a negative  - appreciate cardiology    Problem/Plan - :  ·  Problem: Essential hypertension.   ·  Plan: - continue hydralazine, nifedipine, coreg.    Problem/Plan - :  ·  Problem: Type 2 diabetes mellitus treated with insulin.   ·  Plan: - continue lantus.  Hold Novolog 70/30 and give pre-meal sliding scale  - gabapentin.  - Hgb 1ac a 5.7    Problem/Plan - :  ·  Problem: Hypothyroidism.   ·  Plan: - continue synthroid.    Problem/Plan - :  ·  Problem: Elevated LFTs  ·  Plan: - ?hepatosteatosis?  If symptomatic, will get RUQ limited U/S  
65 y/o F with HTN, HLD, DM, CKD, hypothyroidism presents from nephrology office for worsening creatinine and hyperkalemia on labs    EKG: NSR no acute changes    1. SOB  - CXR with hazy RLL opacity  - volume up on exam   - denies CP  - EKG with no acute changes  - echo normal LV/RV , sever Phtn   - lasix 40 mg IV BID     2. NISH on CKD  -hyperkalemia on admission now resolved   - f/u renal recs    3. HTN  -controlled  -c/w coreg, hydralazine and nifedipine  -continue to monitor BP    4. DVT prophylaxis  -hep subq
65 y/o F with HTN, HLD, DM, CKD, hypothyroidism presents from nephrology office for worsening creatinine and hyperkalemia on labs.  Pt and daughter report that pt has had facial swelling, and dyspnea for the past few days.  Pt also has had mild burning midline chest pain for a few days that is non exertional.      NISH on CKD  Her baseline Serum Cr 2.5-3.0.  NISH possibly cardio renal   -Renal function relatively stable  - Diuretics per cardiology.  - Monitor BMP   - Avoid nephrotoxics, NSIADS RCA      FLuid overload  Likely due to CHF as she is not Nephrotic.  Cardiology follow up   - lasix as per Cardiology.  - Monitor daily weights     Acidosis  on oral bicarb  monitor serum co2    Hyperphosphatemia  on  binders  low PO4 diet    Proteinuria  3.3 gm in office  vasculitis work up negative in office  sec to DM   Monitor   
65 y/o F with HTN, HLD, DM2, CKD, hypothyroidism presents from nephrology office for worsening creatinine and hyperkalemia on labs.      Problem/Plan - :  ·  Problem: Acute on chronic kidney failure w/hyperkalemia  ·  Plan: hyperkalemia resolved  - daily BMP  - renal U/S without hydronephrosis  - avoid nephrotoxins  - continue bicarb  - ?progression of diabetic nephropathy?  - ?element of cardiorenal disease?  - outpt vasculitis workup (-)  - no evidence for nephrotic syndrome  - monitor BMP daily while on diuretics  - appreciate nephrology    Problem/Plan - :  ·  Problem: Pleural effusion rt   ·  Plan: BNP --> 3164  - TTE 3/11/22 -->  severe pulm HTN, Stage I diastolic dysfunction, nl LV systolic function, grossly nl RV systolic fxn  Started on lasix 40 mg IV bid 3/11/21    Problem/Plan - :  ·  Problem: Chest pain.   ·  Plan: - TTE 3/11/22 --> severe pulm HTN, Stage I diastolic dysfunction, nl LV systolic function, grossly nl RV systolic fxn  - await nuclear stress test  - appreciate cardiology    Problem/Plan - :  ·  Problem: Essential hypertension.   ·  Plan: - continue hydralazine, nifedipine, coreg.    Problem/Plan - :  ·  Problem: Type 2 diabetes mellitus treated with insulin.   ·  Plan: - continue lantus.  Hold Novolog 70/30 and give pre-meal sliding scale  - gabapentin.  - Hgb 1ac a 5.7    Problem/Plan - :  ·  Problem: Hypothyroidism.   ·  Plan: - continue synthroid.    Problem/Plan - :  ·  Problem: Elevated LFTs  ·  Plan: - ?hepatosteatosis?  If symptomatic, will get RUQ limited U/S  
65 y/o F with HTN, HLD, DM2, CKD, hypothyroidism presents from nephrology office for worsening creatinine and hyperkalemia on labs.      Problem/Plan - :  ·  Problem: Acute on chronic kidney failure w/hyperkalemia  ·  Plan: hyperkalemia resolved  - daily BMP  - renal U/S without hydronephrosis  - avoid nephrotoxins  - continue bicarb  - ?progression of diabetic nephropathy?  - ?element of cardiorenal disease?  - outpt vasculitis workup (-)  - no evidence for nephrotic syndrome  - monitor BMP daily while on diuretics  - appreciate nephrology    Problem/Plan - :  ·  Problem: Pleural effusion rt   ·  Plan: BNP a 3164  - TTE 3/11/22 a severe pulm HTN, Stage I diastolic dysfunction, nl LV systolic function, grossly nl RV systolic fxn  Started on Lasix 40 mg IV bid 3/11/21 --> switched to PO Lasix 40 mg BID 3/16/22  legs still edematous, renal switched back to IV Lasix. Cr now improving.   repeat BMP tomorrow morning.     Problem/Plan - :  ·  Problem: Chest pain.   ·  Plan: - TTE 3/11/22 a severe pulm HTN, Stage I diastolic dysfunction, nl LV systolic function, grossly nl RV systolic fxn  - Nuclear stress test 3/14/22 a negative  - appreciate cardiology    Problem/Plan - :  ·  Problem: Essential hypertension.   ·  Plan: - continue hydralazine, nifedipine, coreg.    Problem/Plan - :  ·  Problem: Type 2 diabetes mellitus treated with insulin.   ·  Plan: - continue lantus.  Hold Novolog 70/30 and give pre-meal sliding scale  - gabapentin.  - Hgb 1ac a 5.7    Problem/Plan - :  ·  Problem: Hypothyroidism.   ·  Plan: - continue synthroid.    Problem/Plan - :  ·  Problem: Elevated LFTs  ·  Plan: - ?hepatosteatosis?  c/w Lipitor. stable LFTs. Alk phos normal now.   could be hepatic congestions from CHF.   If symptomatic, will get RUQ limited U/S. currently asymptomatic.  acute hepatitis profile (low suspicion)   
65 y/o F with HTN, HLD, DM2, CKD, hypothyroidism presents from nephrology office for worsening creatinine and hyperkalemia on labs.      Problem/Plan - :  ·  Problem: Acute on chronic kidney failure w/hyperkalemia  ·  Plan: hyperkalemia resolved  - trend creatinine  - renal U/S without hydronephrosis  - avoid nephrotoxins  - continue bicarb  - ?progression of diabetic nephropathy?  - ?element of cardiorenal disease?  - monitor BMP daily while on diuretics  - appreciate nephrology    Problem/Plan - :  ·  Problem: Pleural effusion rt   ·  Plan: BNP --> 3164  - TTE 3/11/22 -->  severe pulm HTN, Stage I diastolic dysfunction, nl LV systolic function, grossly nl RV systolic fxn  Started on lasix 40 mg IV bid 3/11/21    Problem/Plan - :  ·  Problem: Chest pain.   ·  Plan: Burning, non-exertional.  Appears c/w GERD, low suspicion for angina  - will give trial of Protonix, Maalox.  - TTE 3/11/22 --> severe pulm HTN, Stage I diastolic dysfunction, nl LV systolic function, grossly nl RV systolic fxn  - appreciate cardiology    Problem/Plan - :  ·  Problem: Essential hypertension.   ·  Plan: - continue hydralazine, nifedipine, coreg.    Problem/Plan - :  ·  Problem: Type 2 diabetes mellitus treated with insulin.   ·  Plan: - continue lantus.  Hold Novolog 70/30 and give pre-meal sliding scale  - gabapentin.  - Hgb 1ac a 5.7    Problem/Plan - :  ·  Problem: Hypothyroidism.   ·  Plan: - continue synthroid.    Problem/Plan - :  ·  Problem: Elevated LFTs  ·  Plan: - ?hepatosteatosis?  If symptomatic, will get RUQ limited U/S  
67 y/o F with HTN, HLD, DM2, CKD, hypothyroidism presents from nephrology office for worsening creatinine and hyperkalemia on labs.      Problem/Plan - :  ·  Problem: Acute on chronic kidney failure w/hyperkalemia  ·  Plan: hyperkalemia resolved  - trend creatinine  - renal U/S without hydronephrosis  - avoid nephrotoxins  - continue bicarb  - ?progression of diabetic nephropathy?  - ?element of cardiorenal disease?  - outpt vasculitis workup (-)  - monitor BMP daily while on diuretics  - appreciate nephrology    Problem/Plan - :  ·  Problem: Pleural effusion rt   ·  Plan: BNP --> 3164  - TTE 3/11/22 -->  severe pulm HTN, Stage I diastolic dysfunction, nl LV systolic function, grossly nl RV systolic fxn  Started on lasix 40 mg IV bid 3/11/21    Problem/Plan - :  ·  Problem: Chest pain.   ·  Plan: Burning, non-exertional.  Appears c/w GERD, low suspicion for angina  - will give trial of Protonix, Maalox.  - TTE 3/11/22 --> severe pulm HTN, Stage I diastolic dysfunction, nl LV systolic function, grossly nl RV systolic fxn  - appreciate cardiology    Problem/Plan - :  ·  Problem: Essential hypertension.   ·  Plan: - continue hydralazine, nifedipine, coreg.    Problem/Plan - :  ·  Problem: Type 2 diabetes mellitus treated with insulin.   ·  Plan: - continue lantus.  Hold Novolog 70/30 and give pre-meal sliding scale  - gabapentin.  - Hgb 1ac a 5.7    Problem/Plan - :  ·  Problem: Hypothyroidism.   ·  Plan: - continue synthroid.    Problem/Plan - :  ·  Problem: Elevated LFTs  ·  Plan: - ?hepatosteatosis?  If symptomatic, will get RUQ limited U/S  
67 y/o F with HTN, HLD, DM, CKD, hypothyroidism presents from nephrology office for worsening creatinine and hyperkalemia on labs    EKG: NSR no acute changes    1. SOB  - CXR with hazy RLL opacity   - EKG with no acute changes  - echo normal LV/RV , sever Phtn   - c/w PO lasix     2. NISH on CKD  -hyperkalemia on admission now resolved   - f/u renal recs    3. HTN  -controlled  -c/w coreg, hydralazine and nifedipine  -continue to monitor BP    4. CP:   - Now pt c/o on and off CP, discussed with daughter   -stress with no ischemia or infarct    DVT prophylaxis  -hep subq    
65 y/o F with HTN, HLD, DM, CKD, hypothyroidism presents from nephrology office for worsening creatinine and hyperkalemia on labs    EKG: NSR no acute changes    1. SOB  - CXR with hazy RLL opacity   - EKG with no acute changes  - echo normal LV/RV , sever Phtn   - c/w IV lasix lasix as per nephrology     2. NISH on CKD  -hyperkalemia on admission now resolved   - f/u renal recs    3. HTN  -controlled  -c/w coreg, hydralazine and nifedipine  -continue to monitor BP    4. CP:   - Now pt c/o on and off CP, discussed with daughter   -stress with no ischemia or infarct    DVT prophylaxis  -hep subq     
65 y/o F with HTN, HLD, DM, CKD, hypothyroidism presents from nephrology office for worsening creatinine and hyperkalemia on labs.  Pt and daughter report that pt has had facial swelling, and dyspnea for the past few days.  Pt also has had mild burning midline chest pain for a few days that is non exertional.      NISH on CKD  baseline scr 2.5-3.0  NISH possibly cardio renal   Diuretics per cardiology--discussed with cardiology shirin start lasix 40mg IV BID  MOnitor BMP   Urine lytes suggestive of ATN  Avoid nephrotoxics, NSIADS RCA      FLuid overload  likely CHF  Cardiology follow up   on lasix   MOnitor daily weights     Acidosis  on oral bicarb  monitor serum co2    Hyperphosphatemia  on  binders  low PO4 diet    Proteinuria  3.3 gm in office  vasculitis work up negative in office  sec to DM   Monitor

## 2022-03-18 NOTE — DIETITIAN INITIAL EVALUATION ADULT. - PERTINENT LABORATORY DATA
03-18 Na 141 mmol/L Glu 151 mg/dL<H> K+ 4.2 mmol/L Cr 4.09 mg/dL<H> BUN 97 mg/dL<H> Phos 5.7 mg/dL<H> A1C with Estimated Average Glucose Result: 5.7 % (03-11-22 @ 07:51)   03-18 @ 08:29 POCT 142 mg/dL  03-17 @ 22:08 POCT 204 mg/dL  03-17 @ 17:18 POCT 168 mg/dL  03-17 @ 12:35 POCT 171 mg/dL

## 2022-03-18 NOTE — PROGRESS NOTE ADULT - SUBJECTIVE AND OBJECTIVE BOX
[facility] NEPHROLOGY PRACTICE   MD ANA KAPADIA MD, PA  [facility] [place]    TEL:  OFFICE: [phone]    From 5pm-7am Answering Service [phone]    -- RENAL FOLLOW UP NOTE ---Date of Service 03-18-22 @ 11:54    Patient is a 66y old  Female who presents with a chief complaint of abnormal labs (17 Mar 2022 13:09)      Patient seen and examined at bedside. No chest pain/sob    VITALS:  T(F): 97.8 (03-18-22 @ 11:25), Max: 98.2 (03-18-22 @ 04:50)  HR: 62 (03-18-22 @ 11:25)  BP: 101/67 (03-18-22 @ 11:25)  RR: 18 (03-18-22 @ 11:25)  SpO2: 96% (03-18-22 @ 11:25)  Wt(kg): --    03-17 @ 07:01  -  03-18 @ 07:00  --------------------------------------------------------  IN: 600 mL / OUT: 0 mL / NET: 600 mL          PHYSICAL EXAM:  Constitutional: NAD  Neck: No JVD  Respiratory: CTAB, no wheezes, rales or rhonchi  Cardiovascular: S1, S2, RRR  Gastrointestinal: BS+, soft, NT/ND  Extremities: + peripheral edema    Hospital Medications:   MEDICATIONS  (STANDING):  aspirin enteric coated 81 milliGRAM(s) Oral daily  atorvastatin 20 milliGRAM(s) Oral at bedtime  calcium acetate 1334 milliGRAM(s) Oral three times a day with meals  carvedilol 6.25 milliGRAM(s) Oral every 12 hours  cyanocobalamin 1000 MICROGram(s) Oral daily  dextrose 40% Gel 15 Gram(s) Oral once  dextrose 5%. 1000 milliLiter(s) (50 mL/Hr) IV Continuous <Continuous>  dextrose 5%. 1000 milliLiter(s) (100 mL/Hr) IV Continuous <Continuous>  dextrose 50% Injectable 25 Gram(s) IV Push once  dextrose 50% Injectable 12.5 Gram(s) IV Push once  dextrose 50% Injectable 25 Gram(s) IV Push once  folic acid 1 milliGRAM(s) Oral daily  furosemide   Injectable 40 milliGRAM(s) IV Push two times a day  gabapentin 300 milliGRAM(s) Oral daily  glucagon  Injectable 1 milliGRAM(s) IntraMuscular once  heparin   Injectable 5000 Unit(s) SubCutaneous three times a day  hydrALAZINE 10 milliGRAM(s) Oral three times a day  insulin glargine Injectable (LANTUS) 10 Unit(s) SubCutaneous at bedtime  insulin lispro (ADMELOG) corrective regimen sliding scale   SubCutaneous three times a day before meals  insulin lispro (ADMELOG) corrective regimen sliding scale   SubCutaneous at bedtime  levothyroxine 75 MICROGram(s) Oral daily  NIFEdipine XL 90 milliGRAM(s) Oral daily  pantoprazole    Tablet 40 milliGRAM(s) Oral before breakfast  sodium bicarbonate 650 milliGRAM(s) Oral three times a day      LABS:  03-18    141  |  103  |  97<H>  ----------------------------<  151<H>  4.2   |  20<L>  |  4.09<H>    Ca    8.6      18 Mar 2022 07:19  Phos  5.7     03-18  Mg     2.20     03-18    TPro  7.2  /  Alb  3.7  /  TBili  0.4  /  DBili      /  AST  71<H>  /  ALT  105<H>  /  AlkPhos  124<H>  03-18    Creatinine Trend: 4.09 <--, 3.89 <--, 4.15 <--, 3.99 <--, 3.90 <--, 3.88 <--, 3.75 <--, 3.97 <--    Albumin, Serum: 3.7 g/dL (03-18 @ 07:19)  Phosphorus Level, Serum: 5.7 mg/dL (03-18 @ 07:19)                              8.1    6.22  )-----------( 165      ( 18 Mar 2022 07:19 )             25.4     Urine Studies:  Urinalysis - [03-09-22 @ 18:16]      Color Light Yellow / Appearance Clear / SG 1.010 / pH 6.5      Gluc Negative / Ketone Negative  / Bili Negative / Urobili <2 mg/dL       Blood Negative / Protein 100 mg/dL / Leuk Est Small / Nitrite Negative      RBC 1 / WBC 5 / Hyaline 0 / Gran  / Sq Epi  / Non Sq Epi 1 / Bacteria Moderate    Urine Creatinine 80      [03-16-22 @ 15:58]  Urine Urea Nitrogen 667.0      [03-16-22 @ 15:58]      HBsAg Reactive      [03-17-22 @ 12:46]  HCV 0.14, Nonreact      [03-17-22 @ 12:46]      RADIOLOGY & ADDITIONAL STUDIES:

## 2022-03-18 NOTE — PROGRESS NOTE ADULT - NSPROGADDITIONALINFOA_GEN_ALL_CORE
I reviewed the overnight course of events on the unit, re-confirming the patient history. I discussed the care with the patient and their family. The plan of care was discussed with the ACP team and modifications were made to the notation where appropriate. Differential diagnosis and plan of care discussed with patient after the evaluation. Advanced care planning was discussed with patient and family.  Advanced care planning forms were reviewed and discussed.  Risks, benefits and alternatives of cardiac procedures were discussed in detail and all questions were answered. 35 minutes spent on total encounter of which more than fifty percent of the encounter was spent counseling and/or coordinating care by the attending physician.
d/w card and renal.    - [facility]  - [phone]
d/w pt and PA/NP David.  d/w card Dr. Prado and renal Dr. Singleton.    - Dr. HCAVEZ et ([facility])  - [phone]

## 2022-05-03 ENCOUNTER — INPATIENT (INPATIENT)
Facility: HOSPITAL | Age: 67
LOS: 10 days | Discharge: HOME | End: 2022-05-14
Attending: HOSPITALIST | Admitting: HOSPITALIST
Payer: MEDICAID

## 2022-05-03 VITALS
TEMPERATURE: 98 F | RESPIRATION RATE: 18 BRPM | HEART RATE: 52 BPM | DIASTOLIC BLOOD PRESSURE: 49 MMHG | OXYGEN SATURATION: 97 % | HEIGHT: 61 IN | SYSTOLIC BLOOD PRESSURE: 110 MMHG

## 2022-05-03 DIAGNOSIS — E11.9 TYPE 2 DIABETES MELLITUS WITHOUT COMPLICATIONS: ICD-10-CM

## 2022-05-03 DIAGNOSIS — R06.02 SHORTNESS OF BREATH: ICD-10-CM

## 2022-05-03 DIAGNOSIS — I10 ESSENTIAL (PRIMARY) HYPERTENSION: ICD-10-CM

## 2022-05-03 DIAGNOSIS — D63.8 ANEMIA IN OTHER CHRONIC DISEASES CLASSIFIED ELSEWHERE: ICD-10-CM

## 2022-05-03 DIAGNOSIS — N18.6 END STAGE RENAL DISEASE: ICD-10-CM

## 2022-05-03 DIAGNOSIS — E03.9 HYPOTHYROIDISM, UNSPECIFIED: ICD-10-CM

## 2022-05-03 PROBLEM — E78.5 HYPERLIPIDEMIA, UNSPECIFIED: Chronic | Status: ACTIVE | Noted: 2022-03-09

## 2022-05-03 LAB
ALBUMIN SERPL ELPH-MCNC: 4.3 G/DL — SIGNIFICANT CHANGE UP (ref 3.3–5)
ALP SERPL-CCNC: 131 U/L — HIGH (ref 40–120)
ALT FLD-CCNC: 46 U/L — HIGH (ref 4–33)
ANION GAP SERPL CALC-SCNC: 18 MMOL/L — HIGH (ref 7–14)
AST SERPL-CCNC: 46 U/L — HIGH (ref 4–32)
BASE EXCESS BLDV CALC-SCNC: 0.9 MMOL/L — SIGNIFICANT CHANGE UP (ref -2–3)
BASOPHILS # BLD AUTO: 0.01 K/UL — SIGNIFICANT CHANGE UP (ref 0–0.2)
BASOPHILS NFR BLD AUTO: 0.1 % — SIGNIFICANT CHANGE UP (ref 0–2)
BILIRUB SERPL-MCNC: 0.6 MG/DL — SIGNIFICANT CHANGE UP (ref 0.2–1.2)
BLOOD GAS VENOUS COMPREHENSIVE RESULT: SIGNIFICANT CHANGE UP
BUN SERPL-MCNC: 113 MG/DL — HIGH (ref 7–23)
CALCIUM SERPL-MCNC: 8.1 MG/DL — LOW (ref 8.4–10.5)
CHLORIDE BLDV-SCNC: 94 MMOL/L — LOW (ref 96–108)
CHLORIDE SERPL-SCNC: 90 MMOL/L — LOW (ref 98–107)
CO2 BLDV-SCNC: 26.4 MMOL/L — HIGH (ref 22–26)
CO2 SERPL-SCNC: 22 MMOL/L — SIGNIFICANT CHANGE UP (ref 22–31)
CREAT SERPL-MCNC: 5 MG/DL — HIGH (ref 0.5–1.3)
DIALYSIS INSTRUMENT RESULT - HEPATITIS B SURFACE ANTIGEN: ABNORMAL
DIALYSIS INSTRUMENT RESULT - HEPATITIS B SURFACE ANTIGEN: ABNORMAL
EGFR: 9 ML/MIN/1.73M2 — LOW
EOSINOPHIL # BLD AUTO: 0.17 K/UL — SIGNIFICANT CHANGE UP (ref 0–0.5)
EOSINOPHIL NFR BLD AUTO: 2 % — SIGNIFICANT CHANGE UP (ref 0–6)
GAS PNL BLDV: 129 MMOL/L — LOW (ref 136–145)
GLUCOSE BLDV-MCNC: 161 MG/DL — HIGH (ref 70–99)
GLUCOSE SERPL-MCNC: 168 MG/DL — HIGH (ref 70–99)
HCO3 BLDV-SCNC: 25 MMOL/L — SIGNIFICANT CHANGE UP (ref 22–29)
HCT VFR BLD CALC: 23.2 % — LOW (ref 34.5–45)
HCT VFR BLDA CALC: 23 % — LOW (ref 34.5–46.5)
HGB BLD CALC-MCNC: 7.6 G/DL — LOW (ref 11.5–15.5)
HGB BLD-MCNC: 7.3 G/DL — LOW (ref 11.5–15.5)
IANC: 6.5 K/UL — SIGNIFICANT CHANGE UP (ref 1.8–7.4)
IMM GRANULOCYTES NFR BLD AUTO: 1.1 % — SIGNIFICANT CHANGE UP (ref 0–1.5)
LACTATE BLDV-MCNC: 0.8 MMOL/L — SIGNIFICANT CHANGE UP (ref 0.5–2)
LYMPHOCYTES # BLD AUTO: 0.86 K/UL — LOW (ref 1–3.3)
LYMPHOCYTES # BLD AUTO: 10.2 % — LOW (ref 13–44)
MAGNESIUM SERPL-MCNC: 4.7 MG/DL — HIGH (ref 1.6–2.6)
MCHC RBC-ENTMCNC: 28.3 PG — SIGNIFICANT CHANGE UP (ref 27–34)
MCHC RBC-ENTMCNC: 31.5 GM/DL — LOW (ref 32–36)
MCV RBC AUTO: 89.9 FL — SIGNIFICANT CHANGE UP (ref 80–100)
MONOCYTES # BLD AUTO: 0.81 K/UL — SIGNIFICANT CHANGE UP (ref 0–0.9)
MONOCYTES NFR BLD AUTO: 9.6 % — SIGNIFICANT CHANGE UP (ref 2–14)
NEUTROPHILS # BLD AUTO: 6.5 K/UL — SIGNIFICANT CHANGE UP (ref 1.8–7.4)
NEUTROPHILS NFR BLD AUTO: 77 % — SIGNIFICANT CHANGE UP (ref 43–77)
NRBC # BLD: 0 /100 WBCS — SIGNIFICANT CHANGE UP
NRBC # FLD: 0 K/UL — SIGNIFICANT CHANGE UP
NT-PROBNP SERPL-SCNC: 4119 PG/ML — HIGH
PCO2 BLDV: 38 MMHG — LOW (ref 39–42)
PH BLDV: 7.43 — SIGNIFICANT CHANGE UP (ref 7.32–7.43)
PHOSPHATE SERPL-MCNC: 7.6 MG/DL — HIGH (ref 2.5–4.5)
PLATELET # BLD AUTO: 202 K/UL — SIGNIFICANT CHANGE UP (ref 150–400)
PO2 BLDV: 47 MMHG — SIGNIFICANT CHANGE UP
POTASSIUM BLDV-SCNC: 3.9 MMOL/L — SIGNIFICANT CHANGE UP (ref 3.5–5.1)
POTASSIUM SERPL-MCNC: 4 MMOL/L — SIGNIFICANT CHANGE UP (ref 3.5–5.3)
POTASSIUM SERPL-SCNC: 4 MMOL/L — SIGNIFICANT CHANGE UP (ref 3.5–5.3)
PROT SERPL-MCNC: 7.9 G/DL — SIGNIFICANT CHANGE UP (ref 6–8.3)
RBC # BLD: 2.58 M/UL — LOW (ref 3.8–5.2)
RBC # FLD: 13.5 % — SIGNIFICANT CHANGE UP (ref 10.3–14.5)
SAO2 % BLDV: 80 % — SIGNIFICANT CHANGE UP
SODIUM SERPL-SCNC: 130 MMOL/L — LOW (ref 135–145)
TROPONIN T, HIGH SENSITIVITY RESULT: 59 NG/L — CRITICAL HIGH
TROPONIN T, HIGH SENSITIVITY RESULT: 62 NG/L — CRITICAL HIGH
WBC # BLD: 8.44 K/UL — SIGNIFICANT CHANGE UP (ref 3.8–10.5)
WBC # FLD AUTO: 8.44 K/UL — SIGNIFICANT CHANGE UP (ref 3.8–10.5)

## 2022-05-03 PROCEDURE — 99285 EMERGENCY DEPT VISIT HI MDM: CPT

## 2022-05-03 PROCEDURE — 99222 1ST HOSP IP/OBS MODERATE 55: CPT

## 2022-05-03 PROCEDURE — 77001 FLUOROGUIDE FOR VEIN DEVICE: CPT | Mod: 26,GC

## 2022-05-03 PROCEDURE — 36556 INSERT NON-TUNNEL CV CATH: CPT

## 2022-05-03 PROCEDURE — 71045 X-RAY EXAM CHEST 1 VIEW: CPT | Mod: 26

## 2022-05-03 PROCEDURE — 76937 US GUIDE VASCULAR ACCESS: CPT | Mod: 26

## 2022-05-03 PROCEDURE — 99223 1ST HOSP IP/OBS HIGH 75: CPT

## 2022-05-03 RX ORDER — SODIUM CHLORIDE 9 MG/ML
1000 INJECTION, SOLUTION INTRAVENOUS
Refills: 0 | Status: DISCONTINUED | OUTPATIENT
Start: 2022-05-03 | End: 2022-05-14

## 2022-05-03 RX ORDER — LORATADINE 10 MG/1
10 TABLET ORAL DAILY
Refills: 0 | Status: DISCONTINUED | OUTPATIENT
Start: 2022-05-03 | End: 2022-05-14

## 2022-05-03 RX ORDER — DEXTROSE 50 % IN WATER 50 %
15 SYRINGE (ML) INTRAVENOUS ONCE
Refills: 0 | Status: DISCONTINUED | OUTPATIENT
Start: 2022-05-03 | End: 2022-05-14

## 2022-05-03 RX ORDER — LEVOTHYROXINE SODIUM 125 MCG
75 TABLET ORAL DAILY
Refills: 0 | Status: DISCONTINUED | OUTPATIENT
Start: 2022-05-03 | End: 2022-05-14

## 2022-05-03 RX ORDER — DEXTROSE 50 % IN WATER 50 %
25 SYRINGE (ML) INTRAVENOUS ONCE
Refills: 0 | Status: DISCONTINUED | OUTPATIENT
Start: 2022-05-03 | End: 2022-05-14

## 2022-05-03 RX ORDER — NIFEDIPINE 30 MG
90 TABLET, EXTENDED RELEASE 24 HR ORAL DAILY
Refills: 0 | Status: DISCONTINUED | OUTPATIENT
Start: 2022-05-03 | End: 2022-05-04

## 2022-05-03 RX ORDER — DEXTROSE 50 % IN WATER 50 %
12.5 SYRINGE (ML) INTRAVENOUS ONCE
Refills: 0 | Status: DISCONTINUED | OUTPATIENT
Start: 2022-05-03 | End: 2022-05-14

## 2022-05-03 RX ORDER — HYDRALAZINE HCL 50 MG
10 TABLET ORAL THREE TIMES A DAY
Refills: 0 | Status: DISCONTINUED | OUTPATIENT
Start: 2022-05-03 | End: 2022-05-09

## 2022-05-03 RX ORDER — CHLORHEXIDINE GLUCONATE 213 G/1000ML
1 SOLUTION TOPICAL DAILY
Refills: 0 | Status: DISCONTINUED | OUTPATIENT
Start: 2022-05-03 | End: 2022-05-14

## 2022-05-03 RX ORDER — PANTOPRAZOLE SODIUM 20 MG/1
40 TABLET, DELAYED RELEASE ORAL
Refills: 0 | Status: DISCONTINUED | OUTPATIENT
Start: 2022-05-03 | End: 2022-05-14

## 2022-05-03 RX ORDER — SODIUM CHLORIDE 9 MG/ML
10 INJECTION INTRAMUSCULAR; INTRAVENOUS; SUBCUTANEOUS
Refills: 0 | Status: DISCONTINUED | OUTPATIENT
Start: 2022-05-03 | End: 2022-05-14

## 2022-05-03 RX ORDER — CALCIUM GLUCONATE 100 MG/ML
2 VIAL (ML) INTRAVENOUS ONCE
Refills: 0 | Status: COMPLETED | OUTPATIENT
Start: 2022-05-03 | End: 2022-05-03

## 2022-05-03 RX ORDER — FUROSEMIDE 40 MG
40 TABLET ORAL ONCE
Refills: 0 | Status: COMPLETED | OUTPATIENT
Start: 2022-05-03 | End: 2022-05-03

## 2022-05-03 RX ORDER — CHLORHEXIDINE GLUCONATE 213 G/1000ML
1 SOLUTION TOPICAL
Refills: 0 | Status: DISCONTINUED | OUTPATIENT
Start: 2022-05-03 | End: 2022-05-03

## 2022-05-03 RX ORDER — FOLIC ACID 0.8 MG
1 TABLET ORAL DAILY
Refills: 0 | Status: DISCONTINUED | OUTPATIENT
Start: 2022-05-03 | End: 2022-05-14

## 2022-05-03 RX ORDER — SODIUM BICARBONATE 1 MEQ/ML
650 SYRINGE (ML) INTRAVENOUS THREE TIMES A DAY
Refills: 0 | Status: DISCONTINUED | OUTPATIENT
Start: 2022-05-03 | End: 2022-05-04

## 2022-05-03 RX ORDER — GABAPENTIN 400 MG/1
300 CAPSULE ORAL DAILY
Refills: 0 | Status: DISCONTINUED | OUTPATIENT
Start: 2022-05-03 | End: 2022-05-14

## 2022-05-03 RX ORDER — PREGABALIN 225 MG/1
1000 CAPSULE ORAL DAILY
Refills: 0 | Status: DISCONTINUED | OUTPATIENT
Start: 2022-05-03 | End: 2022-05-14

## 2022-05-03 RX ORDER — ASPIRIN/CALCIUM CARB/MAGNESIUM 324 MG
81 TABLET ORAL DAILY
Refills: 0 | Status: DISCONTINUED | OUTPATIENT
Start: 2022-05-03 | End: 2022-05-14

## 2022-05-03 RX ORDER — ATORVASTATIN CALCIUM 80 MG/1
20 TABLET, FILM COATED ORAL AT BEDTIME
Refills: 0 | Status: DISCONTINUED | OUTPATIENT
Start: 2022-05-03 | End: 2022-05-14

## 2022-05-03 RX ORDER — INSULIN LISPRO 100/ML
VIAL (ML) SUBCUTANEOUS
Refills: 0 | Status: DISCONTINUED | OUTPATIENT
Start: 2022-05-03 | End: 2022-05-14

## 2022-05-03 RX ORDER — CALCIUM ACETATE 667 MG
1334 TABLET ORAL
Refills: 0 | Status: DISCONTINUED | OUTPATIENT
Start: 2022-05-03 | End: 2022-05-14

## 2022-05-03 RX ORDER — INSULIN GLARGINE 100 [IU]/ML
10 INJECTION, SOLUTION SUBCUTANEOUS AT BEDTIME
Refills: 0 | Status: DISCONTINUED | OUTPATIENT
Start: 2022-05-03 | End: 2022-05-14

## 2022-05-03 RX ORDER — GLUCAGON INJECTION, SOLUTION 0.5 MG/.1ML
1 INJECTION, SOLUTION SUBCUTANEOUS ONCE
Refills: 0 | Status: DISCONTINUED | OUTPATIENT
Start: 2022-05-03 | End: 2022-05-14

## 2022-05-03 RX ORDER — CARVEDILOL PHOSPHATE 80 MG/1
6.25 CAPSULE, EXTENDED RELEASE ORAL EVERY 12 HOURS
Refills: 0 | Status: DISCONTINUED | OUTPATIENT
Start: 2022-05-03 | End: 2022-05-14

## 2022-05-03 RX ADMIN — Medication 1: at 17:25

## 2022-05-03 RX ADMIN — Medication 650 MILLIGRAM(S): at 21:44

## 2022-05-03 RX ADMIN — Medication 40 MILLIGRAM(S): at 11:29

## 2022-05-03 RX ADMIN — Medication 200 GRAM(S): at 11:28

## 2022-05-03 RX ADMIN — Medication 2 GRAM(S): at 14:54

## 2022-05-03 RX ADMIN — ATORVASTATIN CALCIUM 20 MILLIGRAM(S): 80 TABLET, FILM COATED ORAL at 21:44

## 2022-05-03 RX ADMIN — INSULIN GLARGINE 10 UNIT(S): 100 INJECTION, SOLUTION SUBCUTANEOUS at 21:43

## 2022-05-03 RX ADMIN — Medication 1334 MILLIGRAM(S): at 17:24

## 2022-05-03 NOTE — ED PROVIDER NOTE - CLINICAL SUMMARY MEDICAL DECISION MAKING FREE TEXT BOX
66F PMH CKD, HTN, HLD, DM2, hypothyroid recent hospital admission Mar 2022 with plans for starting outpt dialysis presenting with 3 days of dyspnea at rest, associated with L shoulder pain, nausea, periorbital edema, b/l LE swelling, and abdominal bloating. Pt O2 sat 98% RA, non tachypneic. Appears fluid overloaded. ECG showing sinus bradycardia with prolonged QTc. Concern for renal failure and fluid overload. Low concern for pneumonia or cardiac origin at this time given afebrile, recent echo showing normal cardiac function last month, but will eval for CHF/ACS. Labs, cxr, lasix, calcium, reassess, plan to admit

## 2022-05-03 NOTE — CONSULT NOTE ADULT - SUBJECTIVE AND OBJECTIVE BOX
[facility]ROLOGY PRACT[facility]   MD ANA KAPADIA MD KRISTINE SOLTANPOUR, DO ANGELA WONG, PA        TEL:  OFFICE: [phone]  From 5pm-7am answering service [phone]    --- INITIAL RENAL CONSULT NOTE ---date of service 05-03-22 @ 12:42    HPI:   66F PMH CKD 5 not on hd, HTN, HLD, DM2, hypothyroid recent hospital admission Mar 2022 with plans for starting outpt dialysis presenting with 3 days of dyspnea at rest, associated with B/L shoulder pain, nausea, periorbital edema, b/l LE swelling, and abdominal bloating. Denies chest pain, vomiting, abdominal pain, headache, cough, fever, chills. Pt takes 60 lasix BID at home.  pt follows up with dr. ferro for ckd, last visit in april referred for transplant and avf.   pt seen in ED, per daughter pt worsen sob, orthopnea, weakness, poor appetite, LE edema worsen past 3 days      Allergies:  No Known Allergies      PAST MEDICAL & SURGICAL HISTORY:  HTN (hypertension)    HLD (hyperlipidemia)        Home Medications Reviewed    Hospital Medications:   MEDICATIONS  (STANDING):      SOCIAL HISTORY:  Denies ETOh, Smoking,     FAMILY HISTORY:      REVIEW OF SYSTEMS:  CONSTITUTIONAL: + weakness, no fevers or chills  EYES/ENT: No visual changes;  No vertigo or throat pain   NECK: No pain or stiffness  RESPIRATORY: No cough, wheezing, hemoptysis; + shortness of breath  CARDIOVASCULAR: No chest pain or palpitations.  GASTROINTESTINAL: No abdominal or epigastric pain. No nausea, vomiting, or hematemesis; No diarrhea or constipation. No melena or hematochezia.  GENITOURINARY: No dysuria, frequency, foamy urine, urinary urgency, incontinence or hematuria  NEUROLOGICAL: No numbness or weakness  SKIN: No itching, burning, rashes, or lesions   VASCULAR: + bilateral lower extremity edema.   All other review of systems is negative unless indicated above.    VITALS:  T(F): 97.5 (05-03-22 @ 10:18), Max: 97.5 (05-03-22 @ 10:18)  HR: 52 (05-03-22 @ 10:18)  BP: 110/49 (05-03-22 @ 10:18)  RR: 18 (05-03-22 @ 10:18)  SpO2: 97% (05-03-22 @ 10:18)  Wt(kg): --    Height (cm): 154.9 (05-03 @ 10:18)    PHYSICAL EXAM:  Constitutional: NAD  HEENT: anicteric sclera, oropharynx clear, MMM  Neck: No JVD  Respiratory: R>L rhonchi  Cardiovascular: S1, S2, RRR  Gastrointestinal: BS+, soft, NT/ND  Extremities: 2+ peripheral edema  Neurological: A/O x 3, no focal deficits  Psychiatric: Normal mood, normal affect  : No CVA tenderness. No early.   Skin: No rashes    LABS:  05-03    130<L>  |  90<L>  |  113<H>  ----------------------------<  168<H>  4.0   |  22  |  5.00<H>    Ca    8.1<L>      03 May 2022 11:57  Phos  7.6     05-03  Mg     4.70     05-03    TPro  7.9  /  Alb  4.3  /  TBili  0.6  /  DBili      /  AST  46<H>  /  ALT  46<H>  /  AlkPhos  131<H>  05-03    Creatinine Trend: 5.00 <--    Urine Studies:        RADIOLOGY & ADDITIONAL STUDIES:

## 2022-05-03 NOTE — ED ADULT TRIAGE NOTE - CHIEF COMPLAINT QUOTE
Hebrew speaking.  Requesting daughter to translate.  Pt c/o jayesh shoulder pain, SOB, and swelling in abd x2 days.  Denies trauma.  +CP

## 2022-05-03 NOTE — ED PROVIDER NOTE - OBJECTIVE STATEMENT
66F PMH CKD, HTN, HLD, DM2, hypothyroid recent hospital admission Mar 2022 with plans for starting outpt dialysis presenting with 3 days of dyspnea at rest, associated with L shoulder pain, nausea, periorbital edema, b/l LE swelling, and abdominal bloating. Denies chest pain, vomiting, abdominal pain, headache, cough, fever, chills. Pt takes 60 lasix BID at home.

## 2022-05-03 NOTE — H&P ADULT - PROBLEM SELECTOR PLAN 3
pt on lantus, 70/30, trulicity at home  will cont lantus here with sliding scale for now  adjust as needed  consider endo eval for less complicated regimen  check a1c

## 2022-05-03 NOTE — CONSULT NOTE ADULT - ASSESSMENT
66F PMH CKD, HTN, HLD, DM2, hypothyroid recent hospital admission Mar 2022 with plans for starting outpt dialysis presenting with 3 days of dyspnea at rest, associated with L shoulder pain, nausea, periorbital edema, b/l LE swelling, and abdominal bloating.    1. SOB  -likely 2/2 CKD   -trops elevated but flat 2/2 renal disease  -echo normal 3/2022 LV/RV , sever Phtn. stress 3/2022 with no ischemia   -plan to start HD    2.  CKD  -plan to start HD  - f/u renal recs    3. HTN  -controlled  -c/w coreg, hydralazine and nifedipine  -continue to monitor BP      DVT prophylaxis  -hep subq 66F PMH CKD, HTN, HLD, DM2, hypothyroid recent hospital admission Mar 2022 with plans for starting outpt dialysis presenting with 3 days of dyspnea at rest, associated with L shoulder pain, nausea, periorbital edema, b/l LE swelling, and abdominal bloating.    1. SOB  -likely 2/2 CKD   -trops elevated but flat 2/2 renal disease  -echo normal 3/2022 LV/RV , sever Phtn. stress 3/2022 with no ischemia   -plan to start HD    2.  CKD  -plan to start HD  - f/u renal recs    3. HTN  -controlled  -c/w coreg, hydralazine and nifedipine  -continue to monitor BP    4. Bradycardia   - asymptomatic monitor on tele     DVT prophylaxis  -hep subq

## 2022-05-03 NOTE — PATIENT PROFILE ADULT - FALL HARM RISK - RISK INTERVENTIONS

## 2022-05-03 NOTE — ED ADULT NURSE NOTE - OBJECTIVE STATEMENT
A&O x3, reporting to ED for bilateral shoulder pain, SOB, bilateral swelling periorbital, abdominal swelling. Bradycardic on arrival, 100% on RA. Attached to cont monitor, family at bedside.

## 2022-05-03 NOTE — ED PROVIDER NOTE - PHYSICAL EXAMINATION
GENERAL: Awake. Alert. NAD. Well nourished.  HEENT: NC/AT, Periorbital edema present b/l. Conjunctiva pink, no scleral icterus. Airway patent. Moist mucous membranes.  LUNGS: CTAB. Rales noted  CARDIAC: Chest non-tender to palpation. RRR.  ABDOMEN: Soft, NT, distended  EXT: B/l 2+ pitting edema at LE up to mid calf, no calf tenderness, distal pulses 2+ bilaterally  NEURO: A&Ox3. Moving all extremities. Sensation and strength intact throughout.  SKIN: Warm and dry.   PSYCH: Normal affect.

## 2022-05-03 NOTE — CONSULT NOTE ADULT - ASSESSMENT
66F PMH CKD 5 not on hd, HTN, HLD, DM2, hypothyroid recent hospital admission Mar 2022 with plans for starting outpt dialysis presenting with fluid overload, worsen renal function, need to initiate dialysis    CKD stage 5 not on HD  now with worsen renal function, mild uremic symptom and fluid overload  recommend to start dialysis today  will need IR for cheryl today  sent hep b ag  consent in chart. all question answered. daughter agreeable  HD today after access.   vascular called for AVF eval   - Monitor BMP   - Avoid nephrotoxics, NSIADS RCA    FLuid overload  renal failure  start HD today after access  Cardiology eval  lasix 40 given  can give 80 iv if worsen sob    HTN   controlled   continue home meds   monitor BP     hyponatremia  likely fluid overload  HD today after access  free water restriction <1L/day    Hyperphosphatemia  continue  binders  low PO4 diet  monitor    hypocalcemia  check pth  monitor    66F PMH CKD 5 not on hd, HTN, HLD, DM2, hypothyroid recent hospital admission Mar 2022 with plans for starting outpt dialysis presenting with fluid overload, worsen renal function, need to initiate dialysis    CKD stage 5 not on HD  now with worsen renal function, mild uremic symptom and fluid overload  recommend to start dialysis today  will need IR for cheryl today  sent hep b ag  consent in chart. all question answered. daughter agreeable  HD today after access.   vascular called for AVF eval   - Monitor BMP   - Avoid nephrotoxics, NSIADS RCA    FLuid overload  renal failure  start HD today after access  Cardiology eval  lasix 40 given  can give 80 iv if worsen sob    HTN   controlled   continue home meds   monitor BP     anemia  check iron panel  r/o GIB  epo with hd  transfuse to keep hb>7    hyponatremia  likely fluid overload  HD today after access  free water restriction <1L/day    Hyperphosphatemia  continue  binders  low PO4 diet  monitor    hypocalcemia  check pth  monitor    66F PMH CKD 5 not on hd, HTN, HLD, DM2, hypothyroid recent hospital admission Mar 2022 with plans for starting outpt dialysis presenting with fluid overload, worsen renal function, need to initiate dialysis,    CKD stage 5 not on HD  now with worsen renal function, mild uremic symptom and fluid overload  recommend to start dialysis today  will need IR for carrie today  sent HBSAg  consent in chart. all question answered. daughter agreeable  HD today after access.   vascular called for AVF eval   - Monitor BMP   - Avoid nephrotoxics, NSIADS RCA    FLuid overload  renal failure  start HD today after access  Cardiology eval  lasix 40 given  can give 80 iv if worsen sob    HTN   controlled   continue home meds   monitor BP     anemia  check iron panel  r/o GIB  epo with hd  transfuse to keep hb>7    hyponatremia  likely fluid overload  HD today after access  free water restriction <1L/day    Hyperphosphatemia  continue  binders  low PO4 diet  monitor    hypocalcemia  check pth  monitor

## 2022-05-03 NOTE — CONSULT NOTE ADULT - SUBJECTIVE AND OBJECTIVE BOX
Interventional Radiology Inpatient Consult Note       HPI:66F PMH CKD, HTN, HLD, DM2, hypothyroid recent hospital admission Mar 2022 with plans for starting outpt dialysis presenting with 3 days of dyspnea at rest, associated with L shoulder pain, nausea, periorbital edema, b/l LE swelling, and abdominal bloating. Denies chest pain, vomiting, abdominal pain, headache, cough, fever, chills. Pt takes 60 lasix BID at home.      Vital Signs: Vital Signs Last 24 Hrs  T(C): 36.4 (03 May 2022 10:18), Max: 36.4 (03 May 2022 10:18)  T(F): 97.5 (03 May 2022 10:18), Max: 97.5 (03 May 2022 10:18)  HR: 52 (03 May 2022 10:18) (52 - 52)  BP: 110/49 (03 May 2022 10:18) (110/49 - 110/49)  BP(mean): --  RR: 18 (03 May 2022 10:18) (18 - 18)  SpO2: 97% (03 May 2022 10:18) (97% - 97%)    Past Medical/ Surgical History: PAST MEDICAL & SURGICAL HISTORY:  HTN (hypertension)    HLD (hyperlipidemia)        Allergies: Allergies    No Known Allergies    Intolerances        Medications: MEDICATIONS  (STANDING):    MEDICATIONS  (PRN):      SOCIAL HISTORY:    FAMILY HISTORY:        PHYSICAL EXAM:    General:   Well-groomed, well-nourished, in no distress  Lungs:  CTA bilaterally  Cardiovascular:   S1, S2,   Abdomen:  Soft, non-tender, non-distended,   Extremities:  no calf tenderness/swelling bilaterally  Musculoskeletal:  Full ROM in all joints w/o swelling/tenderness/effusion  Neuro/Psych:  A &O x 3    LABS:                        7.3    8.44  )-----------( 202      ( 03 May 2022 12:35 )             23.2     05-03    130<L>  |  90<L>  |  113<H>  ----------------------------<  168<H>  4.0   |  22  |  5.00<H>    Ca    8.1<L>      03 May 2022 11:57  Phos  7.6     05-03  Mg     4.70     05-03    TPro  7.9  /  Alb  4.3  /  TBili  0.6  /  DBili  x   /  AST  46<H>  /  ALT  46<H>  /  AlkPhos  131<H>  05-03          RADIOLOGY & ADDITIONAL STUDIES:      A/P: 66F CKD5 now needing urgent HD today; IR c/s for HD access  - Please place order for IR Procedure "HD Access Insertion", approving attending Dr. Eddy   - Plan for procedure today; no need for NPO  - continue to therapeutic and prophylactic anticoagulants  - maintain active type and screen x 2  - PLEASE OBTAIN STAT COAGS (PT/INR/aPTT)  - Please complete IR pre-procedure note         Interventional Radiology Inpatient Consult Note       HPI:66F PMH CKD, HTN, HLD, DM2, hypothyroid recent hospital admission Mar 2022 with plans for starting outpt dialysis presenting with 3 days of dyspnea at rest, associated with L shoulder pain, nausea, periorbital edema, b/l LE swelling, and abdominal bloating. Denies chest pain, vomiting, abdominal pain, headache, cough, fever, chills. Pt takes 60 lasix BID at home.      Vital Signs: Vital Signs Last 24 Hrs  T(C): 36.4 (03 May 2022 10:18), Max: 36.4 (03 May 2022 10:18)  T(F): 97.5 (03 May 2022 10:18), Max: 97.5 (03 May 2022 10:18)  HR: 52 (03 May 2022 10:18) (52 - 52)  BP: 110/49 (03 May 2022 10:18) (110/49 - 110/49)  BP(mean): --  RR: 18 (03 May 2022 10:18) (18 - 18)  SpO2: 97% (03 May 2022 10:18) (97% - 97%)    Past Medical/ Surgical History: PAST MEDICAL & SURGICAL HISTORY:  HTN (hypertension)    HLD (hyperlipidemia)        Allergies: Allergies    No Known Allergies    Intolerances        Medications: MEDICATIONS  (STANDING):    MEDICATIONS  (PRN):      SOCIAL HISTORY:    FAMILY HISTORY:        PHYSICAL EXAM:    General:   Well-groomed, well-nourished, in no distress  Lungs:  CTA bilaterally  Cardiovascular:   S1, S2,   Abdomen:  Soft, non-tender, non-distended,   Extremities:  no calf tenderness/swelling bilaterally  Musculoskeletal:  Full ROM in all joints w/o swelling/tenderness/effusion  Neuro/Psych:  A &O x 3    LABS:                        7.3    8.44  )-----------( 202      ( 03 May 2022 12:35 )             23.2     05-03    130<L>  |  90<L>  |  113<H>  ----------------------------<  168<H>  4.0   |  22  |  5.00<H>    Ca    8.1<L>      03 May 2022 11:57  Phos  7.6     05-03  Mg     4.70     05-03    TPro  7.9  /  Alb  4.3  /  TBili  0.6  /  DBili  x   /  AST  46<H>  /  ALT  46<H>  /  AlkPhos  131<H>  05-03          RADIOLOGY & ADDITIONAL STUDIES:      A/P: 66F CKD5 now needing urgent HD today; IR c/s for HD access  - Please place order for IR Procedure "HD Access Insertion", approving attending Dr. Eddy   - Plan for procedure today; no need for NPO  - continue to therapeutic and prophylactic anticoagulants  - maintain active type and screen x 2  - Please complete IR pre-procedure note

## 2022-05-03 NOTE — CONSULT NOTE ADULT - SUBJECTIVE AND OBJECTIVE BOX
Gary Morrison MD  Interventional Cardiology / Endovascular Specialist  [place] Office : [address] [place] [address] [place] [address]  Tel:   [place] Office : [address] [facility] [place] [address]  Tel: [phone]    HISTORY OF PRESENTING ILLNESS:  66F PMH CKD, HTN, HLD, DM2, hypothyroid recent hospital admission Mar 2022 with plans for starting outpt dialysis presenting with 3 days of dyspnea at rest, associated with L shoulder pain, nausea, periorbital edema, b/l LE swelling, and abdominal bloating. Denies chest pain, vomiting, abdominal pain, headache, cough, fever, chills. Pt takes 60 lasix BID at home.  	  MEDICATIONS:  aspirin enteric coated 81 milliGRAM(s) Oral daily  carvedilol 6.25 milliGRAM(s) Oral every 12 hours  hydrALAZINE 10 milliGRAM(s) Oral three times a day  NIFEdipine XL 90 milliGRAM(s) Oral daily      loratadine 10 milliGRAM(s) Oral daily    gabapentin 300 milliGRAM(s) Oral daily    pantoprazole    Tablet 40 milliGRAM(s) Oral before breakfast    atorvastatin 20 milliGRAM(s) Oral at bedtime  levothyroxine 75 MICROGram(s) Oral daily    calcium acetate 1334 milliGRAM(s) Oral three times a day with meals  chlorhexidine 2% Cloths 1 Application(s) Topical daily  cyanocobalamin 1000 MICROGram(s) Oral daily  folic acid 1 milliGRAM(s) Oral daily  sodium bicarbonate 650 milliGRAM(s) Oral three times a day      PAST MEDICAL/SURGICAL HISTORY  PAST MEDICAL & SURGICAL HISTORY:  HTN (hypertension)    HLD (hyperlipidemia)        SOCIAL HISTORY: Substance Use (street drugs): ( x ) never used  (  ) other:    FAMILY HISTORY:      REVIEW OF SYSTEMS:  CONSTITUTIONAL: No fever, weight loss, or fatigue  EYES: No eye pain, visual disturbances, or discharge  ENMT:  No difficulty hearing, tinnitus, vertigo; No sinus or throat pain  BREASTS: No pain, masses, or nipple discharge  GASTROINTESTINAL: No abdominal or epigastric pain. No nausea, vomiting, or hematemesis; No diarrhea or constipation. No melena or hematochezia.  GENITOURINARY: No dysuria, frequency, hematuria, or incontinence  NEUROLOGICAL: No headaches, memory loss, loss of strength, numbness, or tremors  ENDOCRINE: No heat or cold intolerance; No hair loss  MUSCULOSKELETAL: No joint pain or swelling; No muscle, back, or extremity pain  PSYCHIATRIC: No depression, anxiety, mood swings, or difficulty sleeping  HEME/LYMPH: No easy bruising, or bleeding gums  All others negative    PHYSICAL EXAM:  T(C): 36.4 (05-03-22 @ 10:18), Max: 36.4 (05-03-22 @ 10:18)  HR: 52 (05-03-22 @ 10:18) (52 - 52)  BP: 110/49 (05-03-22 @ 10:18) (110/49 - 110/49)  RR: 18 (05-03-22 @ 10:18) (18 - 18)  SpO2: 97% (05-03-22 @ 10:18) (97% - 97%)  Wt(kg): --  I&O's Summary    Height (cm): 154.9 (05-03 @ 10:18)    GENERAL: NAD  EYES: EOMI, PERRLA, conjunctiva and sclera clear  ENMT: No tonsillar erythema, exudates, or enlargement  Cardiovascular: Normal S1 S2, No JVD, No murmurs, No edema  Respiratory: Lungs clear to auscultation	  Gastrointestinal:  Soft, Non-tender, + BS	  Extremities: No edema                                    7.3    8.44  )-----------( 202      ( 03 May 2022 12:35 )             23.2     05-03    130<L>  |  90<L>  |  113<H>  ----------------------------<  168<H>  4.0   |  22  |  5.00<H>    Ca    8.1<L>      03 May 2022 11:57  Phos  7.6     05-03  Mg     4.70     05-03    TPro  7.9  /  Alb  4.3  /  TBili  0.6  /  DBili  x   /  AST  46<H>  /  ALT  46<H>  /  AlkPhos  131<H>  05-03    proBNP: Serum Pro-Brain Natriuretic Peptide: 4119 pg/mL (05-03 @ 11:57)    Lipid Profile:   HgA1c:   TSH:     Consultant(s) Notes Reviewed:  [x ] YES  [ ] NO    Care Discussed with Consultants/Other Providers [ x] YES  [ ] NO    Imaging Personally Reviewed independently:  [x] YES  [ ] NO    All labs, radiologic studies, vitals, orders and medications list reviewed. Patient is seen and examined at bedside. Case discussed with medical team.

## 2022-05-03 NOTE — H&P ADULT - ASSESSMENT
Pt is a 65 yo female with ho HTN, DM, HLD, CKD, hypothyroidism p/e progressive SOB over last 3 days with LE edema, abd bloating and periorbital edema c/w ESRD with fluid overload.  Pt to start HD today

## 2022-05-03 NOTE — ED PROVIDER NOTE - ATTENDING CONTRIBUTION TO CARE
66F PMH CKD, HTN, HLD, DM2, hypothyroid recent hospital admission Mar 2022 with plans for starting outpt dialysis presenting with 3 days of dyspnea at rest, associated with pitting edema of lower extremity. Denies chest pain, vomiting, abdominal pain, headache, cough, fever, chills. Pt takes 60 lasix BID at home.  pt appears tachypneic, sats 100% on RA pitting edema bl,  will check labs, trop- probnp, diurese admit

## 2022-05-03 NOTE — H&P ADULT - HISTORY OF PRESENT ILLNESS
Pt is a 65 yo female with ho DM, HTN, HLD, CKD, approaching HD, hypothyroidism.  Pt recently admitted for similar scenario which improved with diuretics, however, plans for HD as outpt at that time.  Pt p/w inc SOB for the last 3 days with LE swelling, L shoulder pain, abdominal bloating and periorbital swelling.  Denies n/v/d.  No sick contacts at home; Per daughter, pt is fully vaccinated for COVID.  Now plans to start HD today per nephrology due to progressive sx

## 2022-05-03 NOTE — H&P ADULT - PROBLEM SELECTOR PLAN 1
pt with advanced CKD now req HD, to start today  IR to place non tunneled catheter today for HD   vasc consulted for AVF  HD per nephrology  pt rec'd lasix 40 in ED with some improvement of sx  cont Ca acetate, bicarb for now

## 2022-05-03 NOTE — CONSULT NOTE ADULT - SUBJECTIVE AND OBJECTIVE BOX
VASCULAR SURGERY CONSULT NOTE  --------------------------------------------------------------------------------------------    HPI: 66F w/ hx of HTN, HLD, DM, hypothyroidism, CKD5, admitted with SOB with worsening renal failure. She has not yet required HD. Reports several days of shortness of breath and shoulder pain.    Denies hx of any vascular procedures. No PPM/AICD. She is right handed. Takes ASA81 daily - no other antiplatelets or anticoagulants.      PAST MEDICAL & SURGICAL HISTORY:  HTN (hypertension)    HLD (hyperlipidemia)      ALLERGIES: No Known Allergies    --------------------------------------------------------------------------------------------    Vitals:   T(C): 36.4 (05-03-22 @ 10:18), Max: 36.4 (05-03-22 @ 10:18)  HR: 52 (05-03-22 @ 10:18) (52 - 52)  BP: 110/49 (05-03-22 @ 10:18) (110/49 - 110/49)  RR: 18 (05-03-22 @ 10:18) (18 - 18)  SpO2: 97% (05-03-22 @ 10:18) (97% - 97%)  CAPILLARY BLOOD GLUCOSE      PHYSICAL EXAM:  General: Alert, NAD  Neuro: A+Ox3  HEENT: NC/AT  Cardio: RRR  Resp: Unlabored breathing  GI/Abd: Soft, nontender, nondistended  Ext: Warm, trace edema  Vasc: Radial/ulnar pulses palpable, sensation/motor function intact, no wounds  Skin: Intact, no breakdown  Psych: Affect normal    --------------------------------------------------------------------------------------------    LABS  CBC (05-03 @ 12:35)                              7.3<L>                         8.44    )----------------(  202        77.0  % Neutrophils, 10.2<L>% Lymphocytes, ANC: 6.50                                23.2<L>    BMP (05-03 @ 11:57)             130<L>  |  90<L>   |  113<H>		Ca++ --      Ca 8.1<L>             ---------------------------------( 168<H>		Mg 4.70<H>             4.0     |  22      |  5.00<H>			Ph 7.6<H>    LFTs (05-03 @ 11:57)      TPro 7.9 / Alb 4.3 / TBili 0.6 / DBili -- / AST 46<H> / ALT 46<H> / AlkPhos 131<H>          VBG (05-03 @ 12:02)     7.43 / 38<L> / 47 / 25 / 0.9 / 80.0%     Lactate: 0.8    --------------------------------------------------------------------------------------------

## 2022-05-03 NOTE — CONSULT NOTE ADULT - ASSESSMENT
66F w/ hx of HTN, HLD, DM, hypothyroidism, CKD5, admitted with SOB with worsening renal failure requiring initiation of HD.    - Patient is right handed and no PPM/AICD - please protect left arm, pink wristband placed on patient (no blood draws, IVs)  - Vein mapping ordered  - IR consult for shiley/permacath  - Discussed with vascular surgery fellow on behalf of Dr. Palomo MICHAEL Team / Vascular Surgery  Pager [phone] 66F w/ hx of HTN, HLD, DM, hypothyroidism, CKD5, admitted with SOB with worsening renal failure requiring initiation of HD.    - Patient is right handed and no PPM/AICD - please protect left arm, pink wristband placed on patient (no blood draws, IVs)  - Vein mapping ordered  - IR consult for shiley/permacath  - Please document medical optimization and risk stratification for AV fistula creation  - Discussed with vascular surgery fellow on behalf of Dr. Palomo MICHAEL Team / Vascular Surgery  Pager [phone]

## 2022-05-03 NOTE — ED ADULT NURSE NOTE - CHIEF COMPLAINT QUOTE
Arabic speaking.  Requesting daughter to translate.  Pt c/o jayesh shoulder pain, SOB, and swelling in abd x2 days.  Denies trauma.  +CP

## 2022-05-03 NOTE — ED PROVIDER NOTE - PROGRESS NOTE DETAILS
Hina Ford DO (PGY1): Spoke with nephrology. Will send pt for dialysis today and admit to Dr. Dwight marin. [facility] paged.

## 2022-05-04 LAB
A1C WITH ESTIMATED AVERAGE GLUCOSE RESULT: 5.6 % — SIGNIFICANT CHANGE UP (ref 4–5.6)
ANION GAP SERPL CALC-SCNC: 14 MMOL/L — SIGNIFICANT CHANGE UP (ref 7–14)
BUN SERPL-MCNC: 78 MG/DL — HIGH (ref 7–23)
CALCIUM SERPL-MCNC: 8.3 MG/DL — LOW (ref 8.4–10.5)
CHLORIDE SERPL-SCNC: 97 MMOL/L — LOW (ref 98–107)
CO2 SERPL-SCNC: 25 MMOL/L — SIGNIFICANT CHANGE UP (ref 22–31)
CREAT SERPL-MCNC: 3.38 MG/DL — HIGH (ref 0.5–1.3)
EGFR: 14 ML/MIN/1.73M2 — LOW
ESTIMATED AVERAGE GLUCOSE: 114 — SIGNIFICANT CHANGE UP
GLUCOSE SERPL-MCNC: 84 MG/DL — SIGNIFICANT CHANGE UP (ref 70–99)
HAV IGM SER-ACNC: SIGNIFICANT CHANGE UP
HBV CORE AB SER-ACNC: REACTIVE
HBV CORE IGM SER-ACNC: SIGNIFICANT CHANGE UP
HBV SURFACE AB SER-ACNC: <3 MIU/ML — LOW
HCT VFR BLD CALC: 25.3 % — LOW (ref 34.5–45)
HCV AB S/CO SERPL IA: 0.13 S/CO — SIGNIFICANT CHANGE UP (ref 0–0.99)
HCV AB SERPL-IMP: SIGNIFICANT CHANGE UP
HGB BLD-MCNC: 8.1 G/DL — LOW (ref 11.5–15.5)
MCHC RBC-ENTMCNC: 28.7 PG — SIGNIFICANT CHANGE UP (ref 27–34)
MCHC RBC-ENTMCNC: 32 GM/DL — SIGNIFICANT CHANGE UP (ref 32–36)
MCV RBC AUTO: 89.7 FL — SIGNIFICANT CHANGE UP (ref 80–100)
MRSA PCR RESULT.: SIGNIFICANT CHANGE UP
NRBC # BLD: 0 /100 WBCS — SIGNIFICANT CHANGE UP
NRBC # FLD: 0 K/UL — SIGNIFICANT CHANGE UP
PLATELET # BLD AUTO: 200 K/UL — SIGNIFICANT CHANGE UP (ref 150–400)
POTASSIUM SERPL-MCNC: 3.3 MMOL/L — LOW (ref 3.5–5.3)
POTASSIUM SERPL-SCNC: 3.3 MMOL/L — LOW (ref 3.5–5.3)
RBC # BLD: 2.82 M/UL — LOW (ref 3.8–5.2)
RBC # FLD: 13.5 % — SIGNIFICANT CHANGE UP (ref 10.3–14.5)
S AUREUS DNA NOSE QL NAA+PROBE: SIGNIFICANT CHANGE UP
SARS-COV-2 RNA SPEC QL NAA+PROBE: SIGNIFICANT CHANGE UP
SODIUM SERPL-SCNC: 136 MMOL/L — SIGNIFICANT CHANGE UP (ref 135–145)
TSH SERPL-MCNC: 1.55 UIU/ML — SIGNIFICANT CHANGE UP (ref 0.27–4.2)
WBC # BLD: 10.42 K/UL — SIGNIFICANT CHANGE UP (ref 3.8–10.5)
WBC # FLD AUTO: 10.42 K/UL — SIGNIFICANT CHANGE UP (ref 3.8–10.5)

## 2022-05-04 RX ORDER — POTASSIUM CHLORIDE 20 MEQ
20 PACKET (EA) ORAL ONCE
Refills: 0 | Status: COMPLETED | OUTPATIENT
Start: 2022-05-04 | End: 2022-05-04

## 2022-05-04 RX ORDER — SENNA PLUS 8.6 MG/1
2 TABLET ORAL AT BEDTIME
Refills: 0 | Status: DISCONTINUED | OUTPATIENT
Start: 2022-05-04 | End: 2022-05-14

## 2022-05-04 RX ORDER — POLYETHYLENE GLYCOL 3350 17 G/17G
17 POWDER, FOR SOLUTION ORAL DAILY
Refills: 0 | Status: DISCONTINUED | OUTPATIENT
Start: 2022-05-04 | End: 2022-05-14

## 2022-05-04 RX ORDER — ACETAMINOPHEN 500 MG
650 TABLET ORAL EVERY 6 HOURS
Refills: 0 | Status: DISCONTINUED | OUTPATIENT
Start: 2022-05-04 | End: 2022-05-14

## 2022-05-04 RX ADMIN — Medication 1334 MILLIGRAM(S): at 09:14

## 2022-05-04 RX ADMIN — CARVEDILOL PHOSPHATE 6.25 MILLIGRAM(S): 80 CAPSULE, EXTENDED RELEASE ORAL at 17:24

## 2022-05-04 RX ADMIN — Medication 650 MILLIGRAM(S): at 05:50

## 2022-05-04 RX ADMIN — Medication 1 MILLIGRAM(S): at 11:06

## 2022-05-04 RX ADMIN — SENNA PLUS 2 TABLET(S): 8.6 TABLET ORAL at 22:40

## 2022-05-04 RX ADMIN — ATORVASTATIN CALCIUM 20 MILLIGRAM(S): 80 TABLET, FILM COATED ORAL at 22:37

## 2022-05-04 RX ADMIN — Medication 10 MILLIGRAM(S): at 05:50

## 2022-05-04 RX ADMIN — Medication 20 MILLIEQUIVALENT(S): at 09:12

## 2022-05-04 RX ADMIN — Medication 1334 MILLIGRAM(S): at 11:05

## 2022-05-04 RX ADMIN — INSULIN GLARGINE 10 UNIT(S): 100 INJECTION, SOLUTION SUBCUTANEOUS at 22:37

## 2022-05-04 RX ADMIN — Medication 75 MICROGRAM(S): at 05:50

## 2022-05-04 RX ADMIN — Medication 1334 MILLIGRAM(S): at 17:23

## 2022-05-04 RX ADMIN — PREGABALIN 1000 MICROGRAM(S): 225 CAPSULE ORAL at 11:07

## 2022-05-04 RX ADMIN — LORATADINE 10 MILLIGRAM(S): 10 TABLET ORAL at 11:05

## 2022-05-04 RX ADMIN — Medication 90 MILLIGRAM(S): at 05:56

## 2022-05-04 RX ADMIN — Medication 81 MILLIGRAM(S): at 11:06

## 2022-05-04 RX ADMIN — CHLORHEXIDINE GLUCONATE 1 APPLICATION(S): 213 SOLUTION TOPICAL at 11:08

## 2022-05-04 RX ADMIN — CARVEDILOL PHOSPHATE 6.25 MILLIGRAM(S): 80 CAPSULE, EXTENDED RELEASE ORAL at 05:49

## 2022-05-04 RX ADMIN — Medication 1: at 18:41

## 2022-05-04 RX ADMIN — Medication 10 MILLIGRAM(S): at 22:40

## 2022-05-04 RX ADMIN — POLYETHYLENE GLYCOL 3350 17 GRAM(S): 17 POWDER, FOR SOLUTION ORAL at 17:22

## 2022-05-04 RX ADMIN — PANTOPRAZOLE SODIUM 40 MILLIGRAM(S): 20 TABLET, DELAYED RELEASE ORAL at 05:51

## 2022-05-04 RX ADMIN — GABAPENTIN 300 MILLIGRAM(S): 400 CAPSULE ORAL at 11:06

## 2022-05-04 NOTE — PROGRESS NOTE ADULT - ASSESSMENT
67yo F with Hx HTN, HLD, DM, hypothyroidism, CKD who presented with SOB and worsening renal failure requiring initially of HD. OR planning for LUE AVF tomorrow vs. next Tuesday 5/10 pending OR availability.     PLAN:   - OR planning for LUE AVF tomorrow vs. next Tuesday 5/10 pending OR availability  - Please optimize patient from MEDICAL perspective for AVF and document clearance/optimization when appropriate   - Vein mapping ordered, vascular lab aware patient may go to OR tomorrow  - Please pre-op patient as follows:      - NPO past midnight for OR, IVF/DM management per discretion of primary team     - AM labs tomorrow: CBC, BMP, PT/PTT/INR     - Maintain an active T+S     - COVID result within 72h       Eliana Cuevas, PGY-2  C Team Surgery  #[id number]

## 2022-05-04 NOTE — PROGRESS NOTE ADULT - SUBJECTIVE AND OBJECTIVE BOX
[facility]   MD ANA KAPADIA MD KRISTINE SOLTANPOUR, DO ANGELA WONG, PA    TEL:  OFFICE: [phone]  From 5pm-7am Answering Service [phone]    -- RENAL FOLLOW UP NOTE ---Date of Service 05-04-22 @ 11:06    Patient is a 66y old  Female who presents with a chief complaint of progressive SOB (04 May 2022 07:47)      Patient seen and examined at bedside. No chest pain/sob    VITALS:  T(F): 98.7 (05-04-22 @ 05:30), Max: 98.7 (05-04-22 @ 05:30)  HR: 66 (05-04-22 @ 05:30)  BP: 128/68 (05-04-22 @ 05:30)  RR: 16 (05-04-22 @ 05:30)  SpO2: 100% (05-04-22 @ 05:30)  Wt(kg): --    05-03 @ 07:01  -  05-04 @ 07:00  --------------------------------------------------------  IN: 400 mL / OUT: 1400 mL / NET: -1000 mL          PHYSICAL EXAM:  Constitutional: NAD  Neck: No JVD  Respiratory: b/l crackles  Cardiovascular: S1, S2, RRR  Gastrointestinal: BS+, soft, NT/ND  Extremities: No peripheral edema    Hospital Medications:   MEDICATIONS  (STANDING):  aspirin enteric coated 81 milliGRAM(s) Oral daily  atorvastatin 20 milliGRAM(s) Oral at bedtime  calcium acetate 1334 milliGRAM(s) Oral three times a day with meals  carvedilol 6.25 milliGRAM(s) Oral every 12 hours  chlorhexidine 2% Cloths 1 Application(s) Topical daily  cyanocobalamin 1000 MICROGram(s) Oral daily  dextrose 5%. 1000 milliLiter(s) (100 mL/Hr) IV Continuous <Continuous>  dextrose 5%. 1000 milliLiter(s) (50 mL/Hr) IV Continuous <Continuous>  dextrose 50% Injectable 25 Gram(s) IV Push once  dextrose 50% Injectable 12.5 Gram(s) IV Push once  dextrose 50% Injectable 25 Gram(s) IV Push once  folic acid 1 milliGRAM(s) Oral daily  gabapentin 300 milliGRAM(s) Oral daily  glucagon  Injectable 1 milliGRAM(s) IntraMuscular once  hydrALAZINE 10 milliGRAM(s) Oral three times a day  insulin glargine Injectable (LANTUS) 10 Unit(s) SubCutaneous at bedtime  insulin lispro (ADMELOG) corrective regimen sliding scale   SubCutaneous three times a day before meals  levothyroxine 75 MICROGram(s) Oral daily  loratadine 10 milliGRAM(s) Oral daily  NIFEdipine XL 90 milliGRAM(s) Oral daily  pantoprazole    Tablet 40 milliGRAM(s) Oral before breakfast  sodium bicarbonate 650 milliGRAM(s) Oral three times a day      LABS:  05-04    136  |  97<L>  |  78<H>  ----------------------------<  84  3.3<L>   |  25  |  3.38<H>    Ca    8.3<L>      04 May 2022 06:42  Phos  7.6     05-03  Mg     4.70     05-03    TPro  7.9  /  Alb  4.3  /  TBili  0.6  /  DBili      /  AST  46<H>  /  ALT  46<H>  /  AlkPhos  131<H>  05-03    Creatinine Trend: 3.38 <--, 5.00 <--    Albumin, Serum: 4.3 g/dL (05-03 @ 11:57)  Phosphorus Level, Serum: 7.6 mg/dL (05-03 @ 11:57)                              8.1    10.42 )-----------( 200      ( 04 May 2022 06:42 )             25.3     Urine Studies:      TSH 1.55      [05-04-22 @ 06:42]    HCV 0.13, Nonreact      [05-04-22 @ 05:51]      RADIOLOGY & ADDITIONAL STUDIES:

## 2022-05-04 NOTE — PROGRESS NOTE ADULT - SUBJECTIVE AND OBJECTIVE BOX
Gary Morrison MD  Interventional Cardiology / Endovascular Specialist  [place] Office : [phone] [place] [address] [address]. [address]  Tel:   [place] Office : [address] [facility] [place] [address]  Tel: [phone]      Subjective/Overnight events: Patient lying in bed comfortably. No acute distress. Denies chest pain, SOB or palpitations  	  MEDICATIONS:  aspirin enteric coated 81 milliGRAM(s) Oral daily  carvedilol 6.25 milliGRAM(s) Oral every 12 hours  hydrALAZINE 10 milliGRAM(s) Oral three times a day  NIFEdipine XL 90 milliGRAM(s) Oral daily      loratadine 10 milliGRAM(s) Oral daily    gabapentin 300 milliGRAM(s) Oral daily    pantoprazole    Tablet 40 milliGRAM(s) Oral before breakfast    atorvastatin 20 milliGRAM(s) Oral at bedtime  dextrose 50% Injectable 25 Gram(s) IV Push once  dextrose 50% Injectable 12.5 Gram(s) IV Push once  dextrose 50% Injectable 25 Gram(s) IV Push once  dextrose Oral Gel 15 Gram(s) Oral once PRN  glucagon  Injectable 1 milliGRAM(s) IntraMuscular once  insulin glargine Injectable (LANTUS) 10 Unit(s) SubCutaneous at bedtime  insulin lispro (ADMELOG) corrective regimen sliding scale   SubCutaneous three times a day before meals  levothyroxine 75 MICROGram(s) Oral daily    calcium acetate 1334 milliGRAM(s) Oral three times a day with meals  chlorhexidine 2% Cloths 1 Application(s) Topical daily  cyanocobalamin 1000 MICROGram(s) Oral daily  dextrose 5%. 1000 milliLiter(s) IV Continuous <Continuous>  dextrose 5%. 1000 milliLiter(s) IV Continuous <Continuous>  folic acid 1 milliGRAM(s) Oral daily  sodium bicarbonate 650 milliGRAM(s) Oral three times a day  sodium chloride 0.9% lock flush 10 milliLiter(s) IV Push every 1 hour PRN      PAST MEDICAL/SURGICAL HISTORY  PAST MEDICAL & SURGICAL HISTORY:  HTN (hypertension)    HLD (hyperlipidemia)        SOCIAL HISTORY: Substance Use (street drugs): ( x ) never used  (  ) other:    FAMILY HISTORY:        PHYSICAL EXAM:  T(C): 37.1 (05-04-22 @ 05:30), Max: 37.1 (05-04-22 @ 05:30)  HR: 66 (05-04-22 @ 05:30) (52 - 66)  BP: 128/68 (05-04-22 @ 05:30) (110/49 - 151/65)  RR: 16 (05-04-22 @ 05:30) (16 - 18)  SpO2: 100% (05-04-22 @ 05:30) (96% - 100%)  Wt(kg): --  I&O's Summary    03 May 2022 07:01  -  04 May 2022 07:00  --------------------------------------------------------  IN: 400 mL / OUT: 1400 mL / NET: -1000 mL          GENERAL: NAD  EYES: EOMI, PERRLA, conjunctiva and sclera clear  ENMT: No tonsillar erythema, exudates, or enlargement  Cardiovascular: Normal S1 S2, No JVD, No murmurs, No edema  Respiratory: Lungs clear to auscultation	  Gastrointestinal:  Soft, Non-tender, + BS	  Extremities: No edema                                    8.1    10.42 )-----------( 200      ( 04 May 2022 06:42 )             25.3     05-04    136  |  97<L>  |  78<H>  ----------------------------<  84  3.3<L>   |  25  |  3.38<H>    Ca    8.3<L>      04 May 2022 06:42  Phos  7.6     05-03  Mg     4.70     05-03    TPro  7.9  /  Alb  4.3  /  TBili  0.6  /  DBili  x   /  AST  46<H>  /  ALT  46<H>  /  AlkPhos  131<H>  05-03    proBNP: Serum Pro-Brain Natriuretic Peptide: 4119 pg/mL (05-03 @ 11:57)    Lipid Profile:   HgA1c:   TSH: Thyroid Stimulating Hormone, Serum: 1.55 uIU/mL (05-04 @ 06:42)      Consultant(s) Notes Reviewed:  [x ] YES  [ ] NO    Care Discussed with Consultants/Other Providers [ x] YES  [ ] NO    Imaging Personally Reviewed independently:  [x] YES  [ ] NO    All labs, radiologic studies, vitals, orders and medications list reviewed. Patient is seen and examined at bedside. Case discussed with medical team.

## 2022-05-04 NOTE — PROGRESS NOTE ADULT - ASSESSMENT
67 yo female with ho HTN, DM, HLD, CKD, hypothyroidism p/e progressive SOB over last 3 days with LE edema, abd bloating and periorbital edema c/w ESRD with fluid overload.  Pt to start HD today    Problem/Plan - 1:  ·  Problem: ESRD on hemodialysis.   ·  Plan: pt with advanced CKD now req HD, to start today  IR to place non tunneled catheter today for HD   vasc consulted for AVF  HD per nephrology  pt rec'd lasix 40 mg in ED  cont Ca acetate, oral bicarb for now  Cardiac clearance appreciated  Pt is medically optimized for AVF and may proceed with procedure    Problem/Plan - 2:  ·  Problem: HTN (hypertension).   ·  Plan: cont coreg, nifedipine, hydralazine with hold parameters  cont ASA  elevaated trop with no delta, likely due to renal dz, pt without CP.    Problem/Plan - 3:  ·  Problem: Diabetes mellitus, type 2.   ·  Plan: pt on lantus, 70/30, trulicity at home  will cont lantus here with sliding scale for now  adjust as needed  a1c = 5.6    Problem/Plan - 4:  ·  Problem: Hypothyroidism.   ·  Plan: cont synthroid  TSH = 1.55.    Problem/Plan - 5:  ·  Problem: Anemia of chronic disease.   ·  Plan: likely multifactorial  B12 def, ESRD.

## 2022-05-04 NOTE — PROGRESS NOTE ADULT - SUBJECTIVE AND OBJECTIVE BOX
Subjective:  Patient seen at bedside this AM. Reports feeling well, without complaints. Denies chest pain, SOB.      24h Events:   - Overnight, no acute events    Objective:  Vital Signs  T(C): 37.1 (05-04 @ 05:30), Max: 37.1 (05-04 @ 05:30)  HR: 66 (05-04 @ 05:30) (52 - 66)  BP: 128/68 (05-04 @ 05:30) (110/49 - 151/65)  RR: 16 (05-04 @ 05:30) (16 - 18)  SpO2: 100% (05-04 @ 05:30) (96% - 100%)      Physical Exam:  GEN: resting in bed comfortably in NAD  NEURO: awake, alert  RESP: no increased WOB  EXTR: LUE with palpable radial + ulnar pulses, sensory + motor intact    Labs:             8.1    10.42 )-----------( 200      ( 04 May 2022 06:42 )             25.3     130<L>  |  90<L>  |  113<H>  ----------------------------<  168<H>  4.0   |  22  |  5.00<H>    Ca    8.1<L>      03 May 2022 11:57  Phos  7.6     05-03  Mg     4.70     05-03    TPro  7.9  /  Alb  4.3  /  TBili  0.6  /  DBili  x   /  AST  46<H>  /  ALT  46<H>  /  AlkPhos  131<H>  05-03    POCT Blood Glucose.: 116 mg/dL (03 May 2022 20:51)  POCT Blood Glucose.: 147 mg/dL (03 May 2022 20:28)  POCT Blood Glucose.: 185 mg/dL (03 May 2022 16:51)    Medications:   MEDICATIONS  (STANDING):  aspirin enteric coated 81 milliGRAM(s) Oral daily  atorvastatin 20 milliGRAM(s) Oral at bedtime  calcium acetate 1334 milliGRAM(s) Oral three times a day with meals  carvedilol 6.25 milliGRAM(s) Oral every 12 hours  chlorhexidine 2% Cloths 1 Application(s) Topical daily  cyanocobalamin 1000 MICROGram(s) Oral daily  dextrose 5%. 1000 milliLiter(s) (100 mL/Hr) IV Continuous <Continuous>  dextrose 5%. 1000 milliLiter(s) (50 mL/Hr) IV Continuous <Continuous>  dextrose 50% Injectable 25 Gram(s) IV Push once  dextrose 50% Injectable 12.5 Gram(s) IV Push once  dextrose 50% Injectable 25 Gram(s) IV Push once  folic acid 1 milliGRAM(s) Oral daily  gabapentin 300 milliGRAM(s) Oral daily  glucagon  Injectable 1 milliGRAM(s) IntraMuscular once  hydrALAZINE 10 milliGRAM(s) Oral three times a day  insulin glargine Injectable (LANTUS) 10 Unit(s) SubCutaneous at bedtime  insulin lispro (ADMELOG) corrective regimen sliding scale   SubCutaneous three times a day before meals  levothyroxine 75 MICROGram(s) Oral daily  loratadine 10 milliGRAM(s) Oral daily  NIFEdipine XL 90 milliGRAM(s) Oral daily  pantoprazole    Tablet 40 milliGRAM(s) Oral before breakfast  sodium bicarbonate 650 milliGRAM(s) Oral three times a day    MEDICATIONS  (PRN):  dextrose Oral Gel 15 Gram(s) Oral once PRN Blood Glucose LESS THAN 70 milliGRAM(s)/deciliter  sodium chloride 0.9% lock flush 10 milliLiter(s) IV Push every 1 hour PRN Pre/post blood products, medications, blood draw, and to maintain line patency

## 2022-05-04 NOTE — PROGRESS NOTE ADULT - SUBJECTIVE AND OBJECTIVE BOX
No acute SOB or CP  Saturating 100% on RA    Vital Signs Last 24 Hrs  T(C): 37.1 (04 May 2022 05:30), Max: 37.1 (04 May 2022 05:30)  T(F): 98.7 (04 May 2022 05:30), Max: 98.7 (04 May 2022 05:30)  HR: 66 (04 May 2022 05:30) (52 - 66)  BP: 128/68 (04 May 2022 05:30) (110/49 - 151/65)  BP(mean): 69 (03 May 2022 14:47) (69 - 69)  RR: 16 (04 May 2022 05:30) (16 - 18)  SpO2: 100% (04 May 2022 05:30) (96% - 100%)    I&O's Summary    05-03-22 @ 07:01  -  05-04-22 @ 07:00  --------------------------------------------------------  IN: 400 mL / OUT: 1400 mL / NET: -1000 mL        GENERAL: NAD  HEENT: NCAT, EOMI  NECK: Supple, No JVD, No carotid bruits  CHEST/LUNG: Decreased BS @ both bases; no wheezes  HEART: Regular rate and rhythm; nl S1/S2; No murmurs, rubs, or gallops  ABDOMEN: Soft, Nontender, Nondistended; Bowel sounds present; No hepatosplenomegaly  EXTREMITIES:  2+ Peripheral Pulses; residual b/l LE edema     LABS:                        8.1    10.42 )-----------( 200      ( 04 May 2022 06:42 )             25.3     05-04    136  |  97<L>  |  78<H>  ----------------------------<  84  3.3<L>   |  25  |  3.38<H>    Ca    8.3<L>      04 May 2022 06:42  Phos  7.6     05-03  Mg     4.70     05-03    TPro  7.9  /  Alb  4.3  /  TBili  0.6  /  DBili  x   /  AST  46<H>  /  ALT  46<H>  /  AlkPhos  131<H>  05-03      CAPILLARY BLOOD GLUCOSE      POCT Blood Glucose.: 86 mg/dL (04 May 2022 09:03)  POCT Blood Glucose.: 116 mg/dL (03 May 2022 20:51)  POCT Blood Glucose.: 147 mg/dL (03 May 2022 20:28)  POCT Blood Glucose.: 185 mg/dL (03 May 2022 16:51)            RADIOLOGY & ADDITIONAL TESTS:    Imaging Personally Reviewed:  [x] YES  [ ] NO    Will obtain old records:  [ ] YES  [x] NO

## 2022-05-04 NOTE — PROGRESS NOTE ADULT - ASSESSMENT
66F PMH CKD 5 not on hd, HTN, HLD, DM2, hypothyroid recent hospital admission Mar 2022 with plans for starting outpt dialysis presenting with fluid overload, worsen renal function, need to initiate dialysis,    CKD stage 5 not on HD  now with worsen renal function, mild uremic symptom and fluid overload  initiated HD 5/3 UF 1L tolerated well  s/p shiely 5/3 with IR  will need permacath for dc planning  f/u vascular for AVF planning  still mildly fluid overloaded plan for PUF today  consent in chart. all question answered. daughter agreeable  - Monitor BMP   - Avoid nephrotoxics, NSIADS RCA    FLuid overload  renal failure  started hd  UF with hd as tolerated  f/u cardio    HTN   controlled   continue home meds   monitor BP     anemia  check iron panel  r/o GIB  epo with hd  transfuse to keep hb>7    hyponatremia  likely fluid overload  better  free water restriction <1L/day    Hyperphosphatemia  continue  binders  low PO4 diet  monitor    hypocalcemia  check pth  monitor    hypokalemia  supplemented  montior  hd with high k bath    hep b  known  outpatient hepatology follow up    66F PMH CKD 5 not on hd, HTN, HLD, DM2, hypothyroid recent hospital admission Mar 2022 with plans for starting outpt dialysis presenting with fluid overload, worsen renal function, need to initiate dialysis,    CKD stage 5 not on HD  now with worsen renal function, mild uremic symptom and fluid overload  initiated HD 5/3 UF 1L tolerated well  s/p shiely 5/3 with IR  will need permacath for dc planning  f/u vascular for AVF planning  still mildly fluid overloaded plan for PUF today  consent in chart. all question answered. daughter agreeable  - Monitor BMP   - Avoid nephrotoxics, NSIADS RCA    PA addendum: notified by dialysis RN pt with low bp, received all bp meds this am. has b/l crackles but not in respiratory distress. sating 100% on RA. will hold PUF today. dc nifedipine. monitor bp    FLuid overload  renal failure  started hd  UF with hd as tolerated  f/u cardio    HTN   controlled   continue home meds   monitor BP     anemia  check iron panel  r/o GIB  epo with hd  transfuse to keep hb>7    hyponatremia  likely fluid overload  better  free water restriction <1L/day    Hyperphosphatemia  continue  binders  low PO4 diet  monitor    hypocalcemia  check pth  monitor    hypokalemia  supplemented  montior  hd with high k bath    hep b  known  outpatient hepatology follow up    66F PMH CKD 5 not on hd, HTN, HLD, DM2, hypothyroid recent hospital admission Mar 2022 with plans for starting outpt dialysis presenting with fluid overload, worsen renal function, need to initiate dialysis,    CKD stage 5 not on HD  now with worsen renal function, mild uremic symptom and fluid overload  initiated HD 5/3 UF 1L tolerated well  s/p shiely 5/3 with IR  will need permacath for dc planning  f/u vascular for AVF planning  still mildly fluid overloaded plan for PUF today  consent in chart. all question answered. daughter agreeable  - Monitor BMP   - Avoid nephrotoxics, NSIADS RCA    PA addendum: notified by dialysis RN pt with low bp, received all bp meds this am. has b/l crackles but not in respiratory distress. sating 100% on RA. will hold PUF today. dc nifedipine. monitor bp    FLuid overload  renal failure  started hd  UF with hd as tolerated  f/u cardio    HTN   controlled   continue home meds   monitor BP     acidosis  sec to renal failure  dc bicarb as pt is on HD  monitor    anemia  check iron panel  r/o GIB  epo with hd  transfuse to keep hb>7    hyponatremia  likely fluid overload  better  free water restriction <1L/day    Hyperphosphatemia  continue  binders  low PO4 diet  monitor    hypocalcemia  check pth  monitor    hypokalemia  supplemented  montior  hd with high k bath    hep b  known  outpatient hepatology follow up

## 2022-05-05 PROBLEM — Z00.00 ENCOUNTER FOR PREVENTIVE HEALTH EXAMINATION: Status: ACTIVE | Noted: 2022-05-05

## 2022-05-05 LAB
ANION GAP SERPL CALC-SCNC: 16 MMOL/L — HIGH (ref 7–14)
APTT BLD: 27.6 SEC — SIGNIFICANT CHANGE UP (ref 27–36.3)
BUN SERPL-MCNC: 94 MG/DL — HIGH (ref 7–23)
CALCIUM SERPL-MCNC: 7.9 MG/DL — LOW (ref 8.4–10.5)
CHLORIDE SERPL-SCNC: 97 MMOL/L — LOW (ref 98–107)
CO2 SERPL-SCNC: 23 MMOL/L — SIGNIFICANT CHANGE UP (ref 22–31)
CREAT SERPL-MCNC: 3.99 MG/DL — HIGH (ref 0.5–1.3)
EGFR: 12 ML/MIN/1.73M2 — LOW
GLUCOSE SERPL-MCNC: 118 MG/DL — HIGH (ref 70–99)
INR BLD: 1.14 RATIO — SIGNIFICANT CHANGE UP (ref 0.88–1.16)
MAGNESIUM SERPL-MCNC: 3.4 MG/DL — HIGH (ref 1.6–2.6)
PHOSPHATE SERPL-MCNC: 5.3 MG/DL — HIGH (ref 2.5–4.5)
POTASSIUM SERPL-MCNC: 3.8 MMOL/L — SIGNIFICANT CHANGE UP (ref 3.5–5.3)
POTASSIUM SERPL-SCNC: 3.8 MMOL/L — SIGNIFICANT CHANGE UP (ref 3.5–5.3)
PROTHROM AB SERPL-ACNC: 13.3 SEC — SIGNIFICANT CHANGE UP (ref 10.5–13.4)
SODIUM SERPL-SCNC: 136 MMOL/L — SIGNIFICANT CHANGE UP (ref 135–145)

## 2022-05-05 RX ADMIN — SENNA PLUS 2 TABLET(S): 8.6 TABLET ORAL at 21:53

## 2022-05-05 RX ADMIN — Medication 1334 MILLIGRAM(S): at 17:47

## 2022-05-05 RX ADMIN — Medication 10 MILLIGRAM(S): at 05:43

## 2022-05-05 RX ADMIN — Medication 81 MILLIGRAM(S): at 11:43

## 2022-05-05 RX ADMIN — PANTOPRAZOLE SODIUM 40 MILLIGRAM(S): 20 TABLET, DELAYED RELEASE ORAL at 07:43

## 2022-05-05 RX ADMIN — Medication 75 MICROGRAM(S): at 05:43

## 2022-05-05 RX ADMIN — PREGABALIN 1000 MICROGRAM(S): 225 CAPSULE ORAL at 11:44

## 2022-05-05 RX ADMIN — CARVEDILOL PHOSPHATE 6.25 MILLIGRAM(S): 80 CAPSULE, EXTENDED RELEASE ORAL at 05:43

## 2022-05-05 RX ADMIN — ATORVASTATIN CALCIUM 20 MILLIGRAM(S): 80 TABLET, FILM COATED ORAL at 21:54

## 2022-05-05 RX ADMIN — INSULIN GLARGINE 10 UNIT(S): 100 INJECTION, SOLUTION SUBCUTANEOUS at 21:52

## 2022-05-05 RX ADMIN — Medication 2: at 13:00

## 2022-05-05 RX ADMIN — Medication 10 MILLIGRAM(S): at 21:53

## 2022-05-05 RX ADMIN — GABAPENTIN 300 MILLIGRAM(S): 400 CAPSULE ORAL at 11:41

## 2022-05-05 RX ADMIN — CHLORHEXIDINE GLUCONATE 1 APPLICATION(S): 213 SOLUTION TOPICAL at 11:46

## 2022-05-05 RX ADMIN — LORATADINE 10 MILLIGRAM(S): 10 TABLET ORAL at 11:41

## 2022-05-05 RX ADMIN — Medication 1 MILLIGRAM(S): at 11:41

## 2022-05-05 RX ADMIN — CARVEDILOL PHOSPHATE 6.25 MILLIGRAM(S): 80 CAPSULE, EXTENDED RELEASE ORAL at 17:47

## 2022-05-05 NOTE — PROGRESS NOTE ADULT - ASSESSMENT
66F PMH CKD 5 not on hd, HTN, HLD, DM2, hypothyroid recent hospital admission Mar 2022 with plans for starting outpt dialysis presenting with fluid overload, worsen renal function, need to initiate dialysis,    CKD stage 5 not on HD  now with worsen renal function, mild uremic symptom and fluid overload  initiated HD 5/3 UF 1L tolerated well  s/p shiely 5/3 with IR  will need permacath for dc planning  f/u vascular for AVF planning  hd today. UF as tolerated   consent in chart. all question answered. daughter agreeable  - Monitor BMP   - Avoid nephrotoxics, NSIADS RCA    FLuid overload  renal failure  started hd  UF with hd as tolerated  f/u cardio    HTN   hypotensive 5/4 nifedipine d/c'd  monitor BP     acidosis  sec to renal failure  dc bicarb as pt is on HD  monitor    anemia  check iron panel  r/o GIB  epo with hd  transfuse to keep hb>7    hyponatremia  likely fluid overload  better  free water restriction <1L/day    Hyperphosphatemia  continue  binders  low PO4 diet  monitor    hypocalcemia  check pth  monitor    hypokalemia  supplemented  montior  hd with high k bath    hep b  known  per daughter did not follow up with hepatology  check hep b PCR  hepatology eval if viral load high

## 2022-05-05 NOTE — PROGRESS NOTE ADULT - SUBJECTIVE AND OBJECTIVE BOX
Gary Morrison MD  Interventional Cardiology / Endovascular Specialist  [place] Office : [phone] [place] [address] [place]. [address]  Tel:   [place] Office : [address] [facility] [place] [address]  Tel: [phone]      Subjective/Overnight events: Patient lying in bed comfortably. No acute distress. Denies chest pain, SOB or palpitations  	  MEDICATIONS:  aspirin enteric coated 81 milliGRAM(s) Oral daily  carvedilol 6.25 milliGRAM(s) Oral every 12 hours  hydrALAZINE 10 milliGRAM(s) Oral three times a day      loratadine 10 milliGRAM(s) Oral daily    acetaminophen     Tablet .. 650 milliGRAM(s) Oral every 6 hours PRN  gabapentin 300 milliGRAM(s) Oral daily    pantoprazole    Tablet 40 milliGRAM(s) Oral before breakfast  polyethylene glycol 3350 17 Gram(s) Oral daily  senna 2 Tablet(s) Oral at bedtime    atorvastatin 20 milliGRAM(s) Oral at bedtime  dextrose 50% Injectable 25 Gram(s) IV Push once  dextrose 50% Injectable 12.5 Gram(s) IV Push once  dextrose 50% Injectable 25 Gram(s) IV Push once  dextrose Oral Gel 15 Gram(s) Oral once PRN  glucagon  Injectable 1 milliGRAM(s) IntraMuscular once  insulin glargine Injectable (LANTUS) 10 Unit(s) SubCutaneous at bedtime  insulin lispro (ADMELOG) corrective regimen sliding scale   SubCutaneous three times a day before meals  levothyroxine 75 MICROGram(s) Oral daily    calcium acetate 1334 milliGRAM(s) Oral three times a day with meals  chlorhexidine 2% Cloths 1 Application(s) Topical daily  cyanocobalamin 1000 MICROGram(s) Oral daily  dextrose 5%. 1000 milliLiter(s) IV Continuous <Continuous>  dextrose 5%. 1000 milliLiter(s) IV Continuous <Continuous>  folic acid 1 milliGRAM(s) Oral daily  sodium chloride 0.9% lock flush 10 milliLiter(s) IV Push every 1 hour PRN      PAST MEDICAL/SURGICAL HISTORY  PAST MEDICAL & SURGICAL HISTORY:  HTN (hypertension)    HLD (hyperlipidemia)        SOCIAL HISTORY: Substance Use (street drugs): ( x ) never used  (  ) other:    FAMILY HISTORY:        PHYSICAL EXAM:  T(C): 36.8 (05-05-22 @ 05:40), Max: 37.2 (05-04-22 @ 14:00)  HR: 57 (05-05-22 @ 05:40) (57 - 69)  BP: 120/54 (05-05-22 @ 05:40) (96/43 - 120/54)  RR: 18 (05-05-22 @ 05:40) (18 - 18)  SpO2: 100% (05-05-22 @ 05:40) (95% - 100%)  Wt(kg): --  I&O's Summary        GENERAL: NAD  EYES: EOMI, PERRLA, conjunctiva and sclera clear  ENMT: No tonsillar erythema, exudates, or enlargement  Cardiovascular: Normal S1 S2, No JVD, No murmurs, No edema  Respiratory: Lungs clear to auscultation	  Gastrointestinal:  Soft, Non-tender, + BS	  Extremities: No edema                              8.1    10.42 )-----------( 200      ( 04 May 2022 06:42 )             25.3     05-05    136  |  97<L>  |  94<H>  ----------------------------<  118<H>  3.8   |  23  |  3.99<H>    Ca    7.9<L>      05 May 2022 08:25  Phos  5.3     05-05  Mg     3.40     05-05    TPro  7.9  /  Alb  4.3  /  TBili  0.6  /  DBili  x   /  AST  46<H>  /  ALT  46<H>  /  AlkPhos  131<H>  05-03    proBNP:   Lipid Profile:   HgA1c:   TSH:     Consultant(s) Notes Reviewed:  [x ] YES  [ ] NO    Care Discussed with Consultants/Other Providers [ x] YES  [ ] NO    Imaging Personally Reviewed independently:  [x] YES  [ ] NO    All labs, radiologic studies, vitals, orders and medications list reviewed. Patient is seen and examined at bedside. Case discussed with medical team.

## 2022-05-05 NOTE — PROGRESS NOTE ADULT - ASSESSMENT
67yo F with Hx HTN, HLD, DM, hypothyroidism, CKD who presented with SOB and worsening renal failure requiring initially of HD. OR planning for LUE AVF tomorrow vs. next Tuesday 5/10 pending OR availability.     PLAN:   - OR planning for LUE AVF tomorrow vs. next Tuesday 5/10 pending OR availability  - Please optimize patient from MEDICAL perspective for AVF and document clearance/optimization when appropriate   - Vein mapping ordered, vascular lab aware patient may go to OR tomorrow  - Please pre-op patient as follows:      - NPO past midnight for OR, IVF/DM management per discretion of primary team     - AM labs tomorrow: CBC, BMP, PT/PTT/INR     - Maintain an active T+S     - COVID result within 72h    67yo F with Hx HTN, HLD, DM, hypothyroidism, CKD who presented with SOB and worsening renal failure requiring initially of HD. OR planning for LUE AVF tomorrow vs. next Tuesday 5/10 pending OR availability.     PLAN:   - OR planning for LUE AVF next Tuesday 5/10 pending OR availability  - Please optimize patient from MEDICAL perspective for AVF and document clearance/optimization when appropriate   - Vein mapping ordered, vascular lab aware patient may go to OR tomorrow  - Please pre-op patient as follows:      - NPO past midnight for OR, IVF/DM management per discretion of primary team     - AM labs tomorrow: CBC, BMP, PT/PTT/INR     - Maintain an active T+S     - COVID result within 72h

## 2022-05-05 NOTE — PROGRESS NOTE ADULT - SUBJECTIVE AND OBJECTIVE BOX
[facility] [place] [facility]   MD ANA KAPADIA MD KRISTINE SOLTANPOUR, DO ANGELA WONG, PA    TEL:  OFFICE: [phone]  From 5pm-7am Answering Service [phone]    -- RENAL FOLLOW UP NOTE ---Date of Service 05-05-22 @ 12:54    Patient is a 66y old  Female who presents with a chief complaint of progressive SOB (05 May 2022 11:30)      Patient seen and examined at bedside. No chest pain/sob    VITALS:  T(F): 98.6 (05-05-22 @ 11:44), Max: 98.9 (05-04-22 @ 14:00)  HR: 59 (05-05-22 @ 11:44)  BP: 123/59 (05-05-22 @ 11:44)  RR: 18 (05-05-22 @ 11:44)  SpO2: 100% (05-05-22 @ 11:44)  Wt(kg): --        PHYSICAL EXAM:  Constitutional: NAD  Neck: No JVD  Respiratory: crackles b/l  Cardiovascular: S1, S2, RRR  Gastrointestinal: BS+, soft, NT/ND  Extremities: No peripheral edema    Hospital Medications:   MEDICATIONS  (STANDING):  aspirin enteric coated 81 milliGRAM(s) Oral daily  atorvastatin 20 milliGRAM(s) Oral at bedtime  calcium acetate 1334 milliGRAM(s) Oral three times a day with meals  carvedilol 6.25 milliGRAM(s) Oral every 12 hours  chlorhexidine 2% Cloths 1 Application(s) Topical daily  cyanocobalamin 1000 MICROGram(s) Oral daily  dextrose 5%. 1000 milliLiter(s) (100 mL/Hr) IV Continuous <Continuous>  dextrose 5%. 1000 milliLiter(s) (50 mL/Hr) IV Continuous <Continuous>  dextrose 50% Injectable 25 Gram(s) IV Push once  dextrose 50% Injectable 12.5 Gram(s) IV Push once  dextrose 50% Injectable 25 Gram(s) IV Push once  folic acid 1 milliGRAM(s) Oral daily  gabapentin 300 milliGRAM(s) Oral daily  glucagon  Injectable 1 milliGRAM(s) IntraMuscular once  hydrALAZINE 10 milliGRAM(s) Oral three times a day  insulin glargine Injectable (LANTUS) 10 Unit(s) SubCutaneous at bedtime  insulin lispro (ADMELOG) corrective regimen sliding scale   SubCutaneous three times a day before meals  levothyroxine 75 MICROGram(s) Oral daily  loratadine 10 milliGRAM(s) Oral daily  pantoprazole    Tablet 40 milliGRAM(s) Oral before breakfast  polyethylene glycol 3350 17 Gram(s) Oral daily  senna 2 Tablet(s) Oral at bedtime      LABS:  05-05    136  |  97<L>  |  94<H>  ----------------------------<  118<H>  3.8   |  23  |  3.99<H>    Ca    7.9<L>      05 May 2022 08:25  Phos  5.3     05-05  Mg     3.40     05-05      Creatinine Trend: 3.99 <--, 3.38 <--, 5.00 <--    Phosphorus Level, Serum: 5.3 mg/dL (05-05 @ 08:25)                              8.1    10.42 )-----------( 200      ( 04 May 2022 06:42 )             25.3     Urine Studies:      TSH 1.55      [05-04-22 @ 06:42]    HBsAb <3.0      [05-04-22 @ 05:51]  HBsAg Reactive      [05-04-22 @ 05:02]  HBcAb Reactive      [05-04-22 @ 05:51]  HCV 0.13, Nonreact      [05-04-22 @ 05:51]      RADIOLOGY & ADDITIONAL STUDIES:

## 2022-05-05 NOTE — PROGRESS NOTE ADULT - ASSESSMENT
67 yo female with ho HTN, DM, HLD, CKD, hypothyroidism p/e progressive SOB over last 3 days with LE edema, abd bloating and periorbital edema c/w ESRD with fluid overload.  Pt to start HD today    Problem/Plan - 1:  ·  Problem: ESRD on hemodialysis.   ·  Plan: pt with advanced CKD now req HD, to start today  IR to place non tunneled catheter today for HD   vasc consulted for AVF tentatively 5/10/22  HD per nephrology  pt rec'd lasix 40 mg in ED  cont Ca acetate, oral bicarb for now  Cardiac clearance appreciated  Pt is medically optimized for AVF and may proceed with procedure    Problem/Plan - 2:  ·  Problem: HTN (hypertension).   ·  Plan: cont coreg, nifedipine, hydralazine with hold parameters  cont ASA  elevaated trop with no delta, likely due to renal dz, pt without CP.    Problem/Plan - 3:  ·  Problem: Diabetes mellitus, type 2.   ·  Plan: pt on lantus, 70/30, trulicity at home  will cont lantus here with sliding scale for now  adjust as needed  a1c = 5.6    Problem/Plan - 4:  ·  Problem: Hypothyroidism.   ·  Plan: cont synthroid  TSH = 1.55.    Problem/Plan - 5:  ·  Problem: Anemia of chronic disease.   ·  Plan: likely multifactorial  B12 def, ESRD.

## 2022-05-05 NOTE — PROGRESS NOTE ADULT - SUBJECTIVE AND OBJECTIVE BOX
SURGERY PROGRESS NOTE  Hospital Day #05-03-22 (2d)    Overnight, no acute events.   Pt seen and examined at bedside.  No complaints.  Pain controlled.  Denies N/V.  Tolerating diet.  Passing flatus and BM.      Vital Signs  T(C): 37.1 (04 May 2022 20:35), Max: 37.2 (04 May 2022 14:00)  T(F): 98.7 (04 May 2022 20:35), Max: 98.9 (04 May 2022 14:00)  HR: 58 (04 May 2022 20:35) (58 - 69)  BP: 118/57 (04 May 2022 20:35) (96/43 - 118/57)  RR: 18 (04 May 2022 20:35) (18 - 18)  SpO2: 95% (04 May 2022 20:35) (95% - 100%)    PHYSICAL EXAM   General:  AAOx3 in NAD  Chest:  Symmetrical chest rise bilaterally, breathing comfortably on RA   Abdomen:  LUE with palpable radial + ulnar pulses, sensory + motor intact    GI PROPHYLAXIS: pantoprazole Tablet 40 milliGRAM(s)    LAB/STUDIES:             8.1    10.42 )-----------( 200      ( 04 May 2022 06:42 )             25.3   136  |  97<L>  |  78<H>  ----------------------------<  84  3.3<L>   |  25  |  3.38<H>  Ca    8.3<L>      04 May 2022 06:42  Phos  7.6     05-03  Mg     4.70     05-03  TPro  7.9  /  Alb  4.3  /  TBili  0.6  /  DBili  x   /  AST  46<H>  /  ALT  46<H>  /  AlkPhos  131<H>  05-03    LIVER FUNCTIONS - ( 03 May 2022 11:57 )  Alb: 4.3 g/dL / Pro: 7.9 g/dL / ALK PHOS: 131 U/L / ALT: 46 U/L / AST: 46 U/L / GGT: x

## 2022-05-05 NOTE — PROGRESS NOTE ADULT - SUBJECTIVE AND OBJECTIVE BOX
SBP down to 90s last evening, but asymptomatic  This morning, has rebounded to 120s    Vital Signs Last 24 Hrs  T(C): 36.8 (05 May 2022 05:40), Max: 37.2 (04 May 2022 14:00)  T(F): 98.3 (05 May 2022 05:40), Max: 98.9 (04 May 2022 14:00)  HR: 57 (05 May 2022 05:40) (57 - 69)  BP: 120/54 (05 May 2022 05:40) (96/43 - 120/54)  BP(mean): --  RR: 18 (05 May 2022 05:40) (18 - 18)  SpO2: 100% (05 May 2022 05:40) (95% - 100%)    I&O's Summary      GENERAL: NAD  HEENT: NCAT, EOMI  NECK: Supple, No JVD, No carotid bruits  CHEST/LUNG: Decreased BS @ both bases; no wheezes  HEART: Regular rate and rhythm; nl S1/S2; No murmurs, rubs, or gallops  ABDOMEN: Soft, Nontender, Nondistended; Bowel sounds present; No hepatosplenomegaly  EXTREMITIES:  2+ Peripheral Pulses; residual b/l LE edema     LABS:                        8.1    10.42 )-----------( 200      ( 04 May 2022 06:42 )             25.3     05-05    136  |  97<L>  |  94<H>  ----------------------------<  118<H>  3.8   |  23  |  3.99<H>    Ca    7.9<L>      05 May 2022 08:25  Phos  5.3     05-05  Mg     3.40     05-05    TPro  7.9  /  Alb  4.3  /  TBili  0.6  /  DBili  x   /  AST  46<H>  /  ALT  46<H>  /  AlkPhos  131<H>  05-03    PT/INR - ( 05 May 2022 09:13 )   PT: 13.3 sec;   INR: 1.14 ratio         PTT - ( 05 May 2022 09:13 )  PTT:27.6 sec  CAPILLARY BLOOD GLUCOSE      POCT Blood Glucose.: 137 mg/dL (05 May 2022 08:53)  POCT Blood Glucose.: 117 mg/dL (05 May 2022 06:22)  POCT Blood Glucose.: 222 mg/dL (04 May 2022 21:27)  POCT Blood Glucose.: 177 mg/dL (04 May 2022 18:17)  POCT Blood Glucose.: 126 mg/dL (04 May 2022 13:06)            RADIOLOGY & ADDITIONAL TESTS:    Imaging Personally Reviewed:  [x] YES  [ ] NO    Will obtain old records:  [ ] YES  [x] NO

## 2022-05-05 NOTE — PROGRESS NOTE ADULT - ASSESSMENT
66F PMH CKD, HTN, HLD, DM2, hypothyroid recent hospital admission Mar 2022 with plans for starting outpt dialysis presenting with 3 days of dyspnea at rest, associated with L shoulder pain, nausea, periorbital edema, b/l LE swelling, and abdominal bloating.    Tele: NSR    1. SOB  -likely 2/2 CKD   -trops elevated but flat 2/2 renal disease  -echo normal 3/2022 LV/RV , sever Phtn. stress 3/2022 with no ischemia   -plan to start HD    2.  CKD  -plan to start HD  - f/u renal recs    3. HTN  -controlled  -c/w coreg, hydralazine and nifedipine  -continue to monitor BP    4. Bradycardia   - asymptomatic monitor on tele   - hypokalemia please replete     5. Pre-op assessment  -plan for AVF creation  -denies CP, SOB or palpitations  -recent stress 3/2022 no evidence of infarct or ischemia  -optimized from cardiac standpoint, RCRI class III, 10.1% risk of 30 day MACE    DVT prophylaxis  -hep subq 66F PMH CKD, HTN, HLD, DM2, hypothyroid recent hospital admission Mar 2022 with plans for starting outpt dialysis presenting with 3 days of dyspnea at rest, associated with L shoulder pain, nausea, periorbital edema, b/l LE swelling, and abdominal bloating.    Tele: NSR    1. SOB  -likely 2/2 CKD   -trops elevated but flat 2/2 renal disease  -echo normal 3/2022 LV/RV , sever Phtn. stress 3/2022 with no ischemia   -plan to start HD    2.  CKD  -plan to start HD  - f/u renal recs    3. HTN  -BP soft  -nifedipine d/ruma by renal  -can d/c hydral is remains soft  -continue to monitor BP    4. Bradycardia   - asymptomatic monitor on tele   - hypokalemia s/p repletion     5. Pre-op assessment  -plan for AVF creation  -denies CP, SOB or palpitations  -recent stress 3/2022 no evidence of infarct or ischemia  -optimized from cardiac standpoint, RCRI class III, 10.1% risk of 30 day MACE    DVT prophylaxis  -hep subq

## 2022-05-06 LAB
ANION GAP SERPL CALC-SCNC: 14 MMOL/L — SIGNIFICANT CHANGE UP (ref 7–14)
BUN SERPL-MCNC: 55 MG/DL — HIGH (ref 7–23)
CALCIUM SERPL-MCNC: 8 MG/DL — LOW (ref 8.4–10.5)
CHLORIDE SERPL-SCNC: 102 MMOL/L — SIGNIFICANT CHANGE UP (ref 98–107)
CO2 SERPL-SCNC: 23 MMOL/L — SIGNIFICANT CHANGE UP (ref 22–31)
CREAT SERPL-MCNC: 3.02 MG/DL — HIGH (ref 0.5–1.3)
EGFR: 16 ML/MIN/1.73M2 — LOW
GLUCOSE SERPL-MCNC: 89 MG/DL — SIGNIFICANT CHANGE UP (ref 70–99)
HBV DNA # SERPL NAA+PROBE: 95 IU/ML — SIGNIFICANT CHANGE UP
HBV DNA # SERPL NAA+PROBE: DETECTED
HBV DNA SERPL NAA+PROBE-LOG#: 1.98 LOGIU/ML — SIGNIFICANT CHANGE UP
HCT VFR BLD CALC: 23.9 % — LOW (ref 34.5–45)
HGB BLD-MCNC: 7.5 G/DL — LOW (ref 11.5–15.5)
MAGNESIUM SERPL-MCNC: 2.5 MG/DL — SIGNIFICANT CHANGE UP (ref 1.6–2.6)
MCHC RBC-ENTMCNC: 28.1 PG — SIGNIFICANT CHANGE UP (ref 27–34)
MCHC RBC-ENTMCNC: 31.4 GM/DL — LOW (ref 32–36)
MCV RBC AUTO: 89.5 FL — SIGNIFICANT CHANGE UP (ref 80–100)
NRBC # BLD: 0 /100 WBCS — SIGNIFICANT CHANGE UP
NRBC # FLD: 0 K/UL — SIGNIFICANT CHANGE UP
PHOSPHATE SERPL-MCNC: 3.9 MG/DL — SIGNIFICANT CHANGE UP (ref 2.5–4.5)
PLATELET # BLD AUTO: 160 K/UL — SIGNIFICANT CHANGE UP (ref 150–400)
POTASSIUM SERPL-MCNC: 3.6 MMOL/L — SIGNIFICANT CHANGE UP (ref 3.5–5.3)
POTASSIUM SERPL-SCNC: 3.6 MMOL/L — SIGNIFICANT CHANGE UP (ref 3.5–5.3)
RBC # BLD: 2.67 M/UL — LOW (ref 3.8–5.2)
RBC # FLD: 13.5 % — SIGNIFICANT CHANGE UP (ref 10.3–14.5)
SODIUM SERPL-SCNC: 139 MMOL/L — SIGNIFICANT CHANGE UP (ref 135–145)
WBC # BLD: 7.55 K/UL — SIGNIFICANT CHANGE UP (ref 3.8–10.5)
WBC # FLD AUTO: 7.55 K/UL — SIGNIFICANT CHANGE UP (ref 3.8–10.5)

## 2022-05-06 RX ORDER — ERYTHROPOIETIN 10000 [IU]/ML
10000 INJECTION, SOLUTION INTRAVENOUS; SUBCUTANEOUS
Refills: 0 | Status: ACTIVE | OUTPATIENT
Start: 2022-05-06 | End: 2023-04-04

## 2022-05-06 RX ADMIN — Medication 1334 MILLIGRAM(S): at 09:50

## 2022-05-06 RX ADMIN — Medication 10 MILLIGRAM(S): at 13:50

## 2022-05-06 RX ADMIN — CARVEDILOL PHOSPHATE 6.25 MILLIGRAM(S): 80 CAPSULE, EXTENDED RELEASE ORAL at 05:25

## 2022-05-06 RX ADMIN — POLYETHYLENE GLYCOL 3350 17 GRAM(S): 17 POWDER, FOR SOLUTION ORAL at 13:50

## 2022-05-06 RX ADMIN — ATORVASTATIN CALCIUM 20 MILLIGRAM(S): 80 TABLET, FILM COATED ORAL at 21:53

## 2022-05-06 RX ADMIN — Medication 75 MICROGRAM(S): at 05:25

## 2022-05-06 RX ADMIN — GABAPENTIN 300 MILLIGRAM(S): 400 CAPSULE ORAL at 13:51

## 2022-05-06 RX ADMIN — Medication 1334 MILLIGRAM(S): at 13:50

## 2022-05-06 RX ADMIN — Medication 10 MILLIGRAM(S): at 05:25

## 2022-05-06 RX ADMIN — Medication 81 MILLIGRAM(S): at 13:52

## 2022-05-06 RX ADMIN — CHLORHEXIDINE GLUCONATE 1 APPLICATION(S): 213 SOLUTION TOPICAL at 13:49

## 2022-05-06 RX ADMIN — CARVEDILOL PHOSPHATE 6.25 MILLIGRAM(S): 80 CAPSULE, EXTENDED RELEASE ORAL at 17:01

## 2022-05-06 RX ADMIN — PANTOPRAZOLE SODIUM 40 MILLIGRAM(S): 20 TABLET, DELAYED RELEASE ORAL at 05:24

## 2022-05-06 RX ADMIN — Medication 1334 MILLIGRAM(S): at 17:00

## 2022-05-06 RX ADMIN — Medication 1 MILLIGRAM(S): at 13:51

## 2022-05-06 RX ADMIN — PREGABALIN 1000 MICROGRAM(S): 225 CAPSULE ORAL at 13:53

## 2022-05-06 RX ADMIN — SENNA PLUS 2 TABLET(S): 8.6 TABLET ORAL at 21:53

## 2022-05-06 RX ADMIN — INSULIN GLARGINE 10 UNIT(S): 100 INJECTION, SOLUTION SUBCUTANEOUS at 22:38

## 2022-05-06 RX ADMIN — LORATADINE 10 MILLIGRAM(S): 10 TABLET ORAL at 13:51

## 2022-05-06 RX ADMIN — Medication 10 MILLIGRAM(S): at 21:53

## 2022-05-06 NOTE — PROGRESS NOTE ADULT - ASSESSMENT
66F PMH CKD, HTN, HLD, DM2, hypothyroid recent hospital admission Mar 2022 with plans for starting outpt dialysis presenting with 3 days of dyspnea at rest, associated with L shoulder pain, nausea, periorbital edema, b/l LE swelling, and abdominal bloating.    Tele: NSR    1. SOB  -likely 2/2 CKD   -trops elevated but flat 2/2 renal disease  -echo normal 3/2022 LV/RV , sever Phtn. stress 3/2022 with no ischemia   -improved on HD     2.  CKD  - on HD  - f/u renal recs    3. HTN  -BP soft  -nifedipine d/ruma by renal  -can d/c hydral if BP soft on HD  -continue to monitor BP    4. Bradycardia   - asymptomatic monitor on tele   - hypokalemia s/p repletion     5. Pre-op assessment  -plan for AVF creation  -denies CP, SOB or palpitations  -recent stress 3/2022 no evidence of infarct or ischemia  -optimized from cardiac standpoint, RCRI class III, 10.1% risk of 30 day MACE    DVT prophylaxis  -hep subq

## 2022-05-06 NOTE — PROGRESS NOTE ADULT - SUBJECTIVE AND OBJECTIVE BOX
No dizziness  No palpitations    Vital Signs Last 24 Hrs  T(C): 36.7 (06 May 2022 16:59), Max: 36.9 (05 May 2022 21:53)  T(F): 98 (06 May 2022 16:59), Max: 98.4 (05 May 2022 21:53)  HR: 65 (06 May 2022 16:59) (58 - 66)  BP: 155/65 (06 May 2022 16:59) (143/60 - 158/70)  BP(mean): --  RR: 18 (06 May 2022 16:59) (18 - 18)  SpO2: 100% (06 May 2022 16:59) (98% - 100%)    I&O's Summary    05-05-22 @ 07:01  -  05-06-22 @ 07:00  --------------------------------------------------------  IN: 400 mL / OUT: 1400 mL / NET: -1000 mL        GENERAL: NAD  HEENT: NCAT, EOMI  NECK: Supple, No JVD, No carotid bruits  CHEST/LUNG: Decreased BS @ both bases; no wheezes  HEART: Regular rate and rhythm; nl S1/S2; No murmurs, rubs, or gallops  ABDOMEN: Soft, Nontender, Nondistended; Bowel sounds present; No hepatosplenomegaly  EXTREMITIES:  2+ Peripheral Pulses; residual b/l LE edema     LABS:                        7.5    7.55  )-----------( 160      ( 06 May 2022 07:35 )             23.9     05-06    139  |  102  |  55<H>  ----------------------------<  89  3.6   |  23  |  3.02<H>    Ca    8.0<L>      06 May 2022 07:35  Phos  3.9     05-06  Mg     2.50     05-06      PT/INR - ( 05 May 2022 09:13 )   PT: 13.3 sec;   INR: 1.14 ratio         PTT - ( 05 May 2022 09:13 )  PTT:27.6 sec  CAPILLARY BLOOD GLUCOSE      POCT Blood Glucose.: 106 mg/dL (06 May 2022 17:46)  POCT Blood Glucose.: 124 mg/dL (06 May 2022 12:29)  POCT Blood Glucose.: 85 mg/dL (06 May 2022 08:38)  POCT Blood Glucose.: 164 mg/dL (05 May 2022 21:41)  POCT Blood Glucose.: 194 mg/dL (05 May 2022 21:35)            RADIOLOGY & ADDITIONAL TESTS:    Imaging Personally Reviewed:  [x] YES  [ ] NO    Will obtain old records:  [ ] YES  [x] NO

## 2022-05-06 NOTE — PROGRESS NOTE ADULT - ASSESSMENT
66F PMH CKD 5 not on hd, HTN, HLD, DM2, hypothyroid recent hospital admission Mar 2022 with plans for starting outpt dialysis presenting with fluid overload, worsen renal function, need to initiate dialysis,    CKD stage 5 not on HD  now with worsen renal function, mild uremic symptom and fluid overload  initiated HD 5/3 UF 1L tolerated well  s/p shiely 5/3 with IR  will need permacath for dc planning  f/u vascular for AVF planning  hd 5/5 uf 1L  lab reviewed no need for hd today  hd tmr  consent in chart. all question answered. daughter agreeable  - Monitor BMP   - Avoid nephrotoxics, NSIADS RCA    FLuid overload  renal failure  started hd  UF with hd as tolerated  much better  f/u cardio    HTN   hypotensive 5/4 nifedipine d/c'd  acceptable  if bp elevated >150 start losartan 25 daily  monitor BP     acidosis  sec to renal failure  dc bicarb as pt is on HD  monitor    anemia  check iron panel  r/o GIB  epo with hd  transfuse to keep hb>7    hyponatremia  likely fluid overload  better  free water restriction <1L/day    Hyperphosphatemia  continue  binders  low PO4 diet  monitor    hypocalcemia  check pth  monitor    hypokalemia  supplemented  montior  hd with high k bath    hep b  known  per daughter did not follow up with hepatology  check hep b PCR  hepatology eval if viral load high    66F PMH CKD 5 not on hd, HTN, HLD, DM2, hypothyroid recent hospital admission Mar 2022 with plans for starting outpt dialysis presenting with fluid overload, worsen renal function, need to initiate dialysis,    CKD stage 5 now on HD ---> ESRD on HD 5/3/2022  now with worsen renal function, mild uremic symptom and fluid overload  initiated HD 5/3 UF 1L tolerated well  s/p shiely 5/3 with IR  will need permacath for dc planning  f/u vascular for AVF planning  hd 5/5 uf 1L  lab reviewed no need for hd today  hd tmr  consent in chart. all question answered. daughter agreeable  - Monitor BMP   - Avoid nephrotoxics, NSIADS RCA    FLuid overload  renal failure  started hd  UF with hd as tolerated  much better  f/u cardio    HTN   hypotensive 5/4 nifedipine d/c'd  acceptable  if bp elevated >150 start losartan 25 daily  monitor BP     acidosis  sec to renal failure  dc bicarb as pt is on HD  monitor    anemia  check iron panel  r/o GIB  epo with hd  transfuse to keep hb>7    hyponatremia  likely fluid overload  better  free water restriction <1L/day    Hyperphosphatemia  continue  binders  low PO4 diet  monitor    hypocalcemia  check pth  monitor    hypokalemia  supplemented  montior  hd with high k bath    Hepatitis B  known  per daughter did not follow up with hepatology  check hep b PCR  hepatology eval if viral load high

## 2022-05-06 NOTE — PROGRESS NOTE ADULT - SUBJECTIVE AND OBJECTIVE BOX
[facility] [place] [facility]OLOGY [facility]ACT[facility]   MD ANA KAPADIA MD KRISTINE SOLTANPOUR, DO ANGELA WONG, PA    TEL:  OFFICE: [phone]  From 5pm-7am Answering Service [phone]    -- RENAL FOLLOW UP NOTE ---Date of Service 05-06-22 @ 13:21    Patient is a 66y old  Female who presents with a chief complaint of progressive SOB (05 May 2022 12:54)      Patient seen and examined at bedside. No chest pain/sob    VITALS:  T(F): 98 (05-06-22 @ 05:25), Max: 98.7 (05-05-22 @ 14:50)  HR: 58 (05-06-22 @ 05:25)  BP: 149/72 (05-06-22 @ 05:25)  RR: 18 (05-06-22 @ 05:25)  SpO2: 98% (05-06-22 @ 05:25)  Wt(kg): --    05-05 @ 07:01  -  05-06 @ 07:00  --------------------------------------------------------  IN: 400 mL / OUT: 1400 mL / NET: -1000 mL          PHYSICAL EXAM:  Constitutional: NAD  Neck: No JVD  Respiratory: CTAB, no wheezes, rales or rhonchi  Cardiovascular: S1, S2, RRR  Gastrointestinal: BS+, soft, NT/ND  Extremities: No peripheral edema    Hospital Medications:   MEDICATIONS  (STANDING):  aspirin enteric coated 81 milliGRAM(s) Oral daily  atorvastatin 20 milliGRAM(s) Oral at bedtime  calcium acetate 1334 milliGRAM(s) Oral three times a day with meals  carvedilol 6.25 milliGRAM(s) Oral every 12 hours  chlorhexidine 2% Cloths 1 Application(s) Topical daily  cyanocobalamin 1000 MICROGram(s) Oral daily  dextrose 5%. 1000 milliLiter(s) (100 mL/Hr) IV Continuous <Continuous>  dextrose 5%. 1000 milliLiter(s) (50 mL/Hr) IV Continuous <Continuous>  dextrose 50% Injectable 25 Gram(s) IV Push once  dextrose 50% Injectable 12.5 Gram(s) IV Push once  dextrose 50% Injectable 25 Gram(s) IV Push once  epoetin denise-epbx (RETACRIT) Injectable 53587 Unit(s) IV Push <User Schedule>  folic acid 1 milliGRAM(s) Oral daily  gabapentin 300 milliGRAM(s) Oral daily  glucagon  Injectable 1 milliGRAM(s) IntraMuscular once  hydrALAZINE 10 milliGRAM(s) Oral three times a day  insulin glargine Injectable (LANTUS) 10 Unit(s) SubCutaneous at bedtime  insulin lispro (ADMELOG) corrective regimen sliding scale   SubCutaneous three times a day before meals  levothyroxine 75 MICROGram(s) Oral daily  loratadine 10 milliGRAM(s) Oral daily  pantoprazole    Tablet 40 milliGRAM(s) Oral before breakfast  polyethylene glycol 3350 17 Gram(s) Oral daily  senna 2 Tablet(s) Oral at bedtime      LABS:  05-06    139  |  102  |  55<H>  ----------------------------<  89  3.6   |  23  |  3.02<H>    Ca    8.0<L>      06 May 2022 07:35  Phos  3.9     05-06  Mg     2.50     05-06      Creatinine Trend: 3.02 <--, 3.99 <--, 3.38 <--, 5.00 <--    Phosphorus Level, Serum: 3.9 mg/dL (05-06 @ 07:35)                              7.5    7.55  )-----------( 160      ( 06 May 2022 07:35 )             23.9     Urine Studies:      TSH 1.55      [05-04-22 @ 06:42]    HBsAb <3.0      [05-04-22 @ 05:51]  HBsAg Reactive      [05-04-22 @ 05:02]  HBcAb Reactive      [05-04-22 @ 05:51]  HCV 0.13, Nonreact      [05-04-22 @ 05:51]      RADIOLOGY & ADDITIONAL STUDIES:   [facility] [place] [facility]   MD ANA KAPADIA MD KRISTINE SOLTANPOUR, DO ANGELA WONG, PA    TEL:  OFFICE: [phone]  From 5pm-7am Answering Service [phone]    -- RENAL FOLLOW UP NOTE ---Date of Service 05-06-22 @ 13:21    Patient is a 66y old  Female who presents with a chief complaint of progressive SOB (05 May 2022 12:54)      Patient seen and examined at bedside. No chest pain/sob. Had  for Chinese. ID #[id number]    VITALS:  T(F): 98 (05-06-22 @ 05:25), Max: 98.7 (05-05-22 @ 14:50)  HR: 58 (05-06-22 @ 05:25)  BP: 149/72 (05-06-22 @ 05:25)  RR: 18 (05-06-22 @ 05:25)  SpO2: 98% (05-06-22 @ 05:25)  Wt(kg): --    05-05 @ 07:01  -  05-06 @ 07:00  --------------------------------------------------------  IN: 400 mL / OUT: 1400 mL / NET: -1000 mL          PHYSICAL EXAM:  Constitutional: NAD  Neck: No JVD  Respiratory: CTAB, no wheezes, rales or rhonchi  Cardiovascular: S1, S2, RRR  Gastrointestinal: BS+, soft, NT/ND  Extremities: No peripheral edema    Hospital Medications:   MEDICATIONS  (STANDING):  aspirin enteric coated 81 milliGRAM(s) Oral daily  atorvastatin 20 milliGRAM(s) Oral at bedtime  calcium acetate 1334 milliGRAM(s) Oral three times a day with meals  carvedilol 6.25 milliGRAM(s) Oral every 12 hours  chlorhexidine 2% Cloths 1 Application(s) Topical daily  cyanocobalamin 1000 MICROGram(s) Oral daily  dextrose 5%. 1000 milliLiter(s) (100 mL/Hr) IV Continuous <Continuous>  dextrose 5%. 1000 milliLiter(s) (50 mL/Hr) IV Continuous <Continuous>  dextrose 50% Injectable 25 Gram(s) IV Push once  dextrose 50% Injectable 12.5 Gram(s) IV Push once  dextrose 50% Injectable 25 Gram(s) IV Push once  epoetin denise-epbx (RETACRIT) Injectable 03606 Unit(s) IV Push <User Schedule>  folic acid 1 milliGRAM(s) Oral daily  gabapentin 300 milliGRAM(s) Oral daily  glucagon  Injectable 1 milliGRAM(s) IntraMuscular once  hydrALAZINE 10 milliGRAM(s) Oral three times a day  insulin glargine Injectable (LANTUS) 10 Unit(s) SubCutaneous at bedtime  insulin lispro (ADMELOG) corrective regimen sliding scale   SubCutaneous three times a day before meals  levothyroxine 75 MICROGram(s) Oral daily  loratadine 10 milliGRAM(s) Oral daily  pantoprazole    Tablet 40 milliGRAM(s) Oral before breakfast  polyethylene glycol 3350 17 Gram(s) Oral daily  senna 2 Tablet(s) Oral at bedtime      LABS:  05-06    139  |  102  |  55<H>  ----------------------------<  89  3.6   |  23  |  3.02<H>    Ca    8.0<L>      06 May 2022 07:35  Phos  3.9     05-06  Mg     2.50     05-06      Creatinine Trend: 3.02 <--, 3.99 <--, 3.38 <--, 5.00 <--    Phosphorus Level, Serum: 3.9 mg/dL (05-06 @ 07:35)                              7.5    7.55  )-----------( 160      ( 06 May 2022 07:35 )             23.9     Urine Studies:      TSH 1.55      [05-04-22 @ 06:42]    HBsAb <3.0      [05-04-22 @ 05:51]  HBsAg Reactive      [05-04-22 @ 05:02]  HBcAb Reactive      [05-04-22 @ 05:51]  HCV 0.13, Nonreact      [05-04-22 @ 05:51]      RADIOLOGY & ADDITIONAL STUDIES:

## 2022-05-06 NOTE — PROGRESS NOTE ADULT - SUBJECTIVE AND OBJECTIVE BOX
Gary Morrison MD  Interventional Cardiology / Endovascular Specialist  [place] Office : [address] [place] [address] [place] [address]  Tel:   [place] Office : [address] [facility] [place] [address]  Tel: [phone]    Subjective/Overnight events: Patient lying in bed comfortably. No acute distress. Denies chest pain, SOB or palpitations  	  MEDICATIONS:  aspirin enteric coated 81 milliGRAM(s) Oral daily  carvedilol 6.25 milliGRAM(s) Oral every 12 hours  hydrALAZINE 10 milliGRAM(s) Oral three times a day      loratadine 10 milliGRAM(s) Oral daily    acetaminophen     Tablet .. 650 milliGRAM(s) Oral every 6 hours PRN  gabapentin 300 milliGRAM(s) Oral daily    pantoprazole    Tablet 40 milliGRAM(s) Oral before breakfast  polyethylene glycol 3350 17 Gram(s) Oral daily  senna 2 Tablet(s) Oral at bedtime    atorvastatin 20 milliGRAM(s) Oral at bedtime  dextrose 50% Injectable 25 Gram(s) IV Push once  dextrose 50% Injectable 12.5 Gram(s) IV Push once  dextrose 50% Injectable 25 Gram(s) IV Push once  dextrose Oral Gel 15 Gram(s) Oral once PRN  glucagon  Injectable 1 milliGRAM(s) IntraMuscular once  insulin glargine Injectable (LANTUS) 10 Unit(s) SubCutaneous at bedtime  insulin lispro (ADMELOG) corrective regimen sliding scale   SubCutaneous three times a day before meals  levothyroxine 75 MICROGram(s) Oral daily    calcium acetate 1334 milliGRAM(s) Oral three times a day with meals  chlorhexidine 2% Cloths 1 Application(s) Topical daily  cyanocobalamin 1000 MICROGram(s) Oral daily  dextrose 5%. 1000 milliLiter(s) IV Continuous <Continuous>  dextrose 5%. 1000 milliLiter(s) IV Continuous <Continuous>  epoetin denise-epbx (RETACRIT) Injectable 93146 Unit(s) IV Push <User Schedule>  folic acid 1 milliGRAM(s) Oral daily  sodium chloride 0.9% lock flush 10 milliLiter(s) IV Push every 1 hour PRN      PAST MEDICAL/SURGICAL HISTORY  PAST MEDICAL & SURGICAL HISTORY:  HTN (hypertension)    HLD (hyperlipidemia)        SOCIAL HISTORY: Substance Use (street drugs): ( x ) never used  (  ) other:    FAMILY HISTORY:      REVIEW OF SYSTEMS:  CONSTITUTIONAL: No fever, weight loss, or fatigue  EYES: No eye pain, visual disturbances, or discharge  ENMT:  No difficulty hearing, tinnitus, vertigo; No sinus or throat pain  BREASTS: No pain, masses, or nipple discharge  GASTROINTESTINAL: No abdominal or epigastric pain. No nausea, vomiting, or hematemesis; No diarrhea or constipation. No melena or hematochezia.  GENITOURINARY: No dysuria, frequency, hematuria, or incontinence  NEUROLOGICAL: No headaches, memory loss, loss of strength, numbness, or tremors  ENDOCRINE: No heat or cold intolerance; No hair loss  MUSCULOSKELETAL: No joint pain or swelling; No muscle, back, or extremity pain  PSYCHIATRIC: No depression, anxiety, mood swings, or difficulty sleeping  HEME/LYMPH: No easy bruising, or bleeding gums  All others negative    PHYSICAL EXAM:  T(C): 36.9 (05-06-22 @ 13:49), Max: 37.1 (05-05-22 @ 14:50)  HR: 66 (05-06-22 @ 13:49) (57 - 68)  BP: 158/70 (05-06-22 @ 13:49) (130/70 - 158/70)  RR: 18 (05-06-22 @ 13:49) (18 - 20)  SpO2: 99% (05-06-22 @ 13:49) (98% - 100%)  Wt(kg): --  I&O's Summary    05 May 2022 07:01  -  06 May 2022 07:00  --------------------------------------------------------  IN: 400 mL / OUT: 1400 mL / NET: -1000 mL        GENERAL: NAD  EYES: EOMI, PERRLA, conjunctiva and sclera clear  ENMT: No tonsillar erythema, exudates, or enlargement  Cardiovascular: Normal S1 S2, No JVD, No murmurs, No edema  Respiratory: Lungs clear to auscultation	  Gastrointestinal:  Soft, Non-tender, + BS	  Extremities: No edema                              7.5    7.55  )-----------( 160      ( 06 May 2022 07:35 )             23.9     05-06    139  |  102  |  55<H>  ----------------------------<  89  3.6   |  23  |  3.02<H>    Ca    8.0<L>      06 May 2022 07:35  Phos  3.9     05-06  Mg     2.50     05-06      proBNP:   Lipid Profile:   HgA1c:   TSH:     Consultant(s) Notes Reviewed:  [x ] YES  [ ] NO    Care Discussed with Consultants/Other Providers [ x] YES  [ ] NO    Imaging Personally Reviewed independently:  [x] YES  [ ] NO    All labs, radiologic studies, vitals, orders and medications list reviewed. Patient is seen and examined at bedside. Case discussed with medical team.

## 2022-05-07 LAB
ANION GAP SERPL CALC-SCNC: 14 MMOL/L — SIGNIFICANT CHANGE UP (ref 7–14)
BUN SERPL-MCNC: 54 MG/DL — HIGH (ref 7–23)
CALCIUM SERPL-MCNC: 7.9 MG/DL — LOW (ref 8.4–10.5)
CHLORIDE SERPL-SCNC: 103 MMOL/L — SIGNIFICANT CHANGE UP (ref 98–107)
CO2 SERPL-SCNC: 20 MMOL/L — LOW (ref 22–31)
CREAT SERPL-MCNC: 3.17 MG/DL — HIGH (ref 0.5–1.3)
EGFR: 15 ML/MIN/1.73M2 — LOW
FERRITIN SERPL-MCNC: 83 NG/ML — SIGNIFICANT CHANGE UP (ref 15–150)
GLUCOSE SERPL-MCNC: 87 MG/DL — SIGNIFICANT CHANGE UP (ref 70–99)
HCT VFR BLD CALC: 23.8 % — LOW (ref 34.5–45)
HGB BLD-MCNC: 7.4 G/DL — LOW (ref 11.5–15.5)
IRON SATN MFR SERPL: 15 % — SIGNIFICANT CHANGE UP (ref 14–50)
IRON SATN MFR SERPL: 37 UG/DL — SIGNIFICANT CHANGE UP (ref 30–160)
MAGNESIUM SERPL-MCNC: 2.2 MG/DL — SIGNIFICANT CHANGE UP (ref 1.6–2.6)
MCHC RBC-ENTMCNC: 28.1 PG — SIGNIFICANT CHANGE UP (ref 27–34)
MCHC RBC-ENTMCNC: 31.1 GM/DL — LOW (ref 32–36)
MCV RBC AUTO: 90.5 FL — SIGNIFICANT CHANGE UP (ref 80–100)
NRBC # BLD: 0 /100 WBCS — SIGNIFICANT CHANGE UP
NRBC # FLD: 0 K/UL — SIGNIFICANT CHANGE UP
PHOSPHATE SERPL-MCNC: 4.6 MG/DL — HIGH (ref 2.5–4.5)
PLATELET # BLD AUTO: 145 K/UL — LOW (ref 150–400)
POTASSIUM SERPL-MCNC: 3.7 MMOL/L — SIGNIFICANT CHANGE UP (ref 3.5–5.3)
POTASSIUM SERPL-SCNC: 3.7 MMOL/L — SIGNIFICANT CHANGE UP (ref 3.5–5.3)
PTH-INTACT FLD-MCNC: 204 PG/ML — HIGH (ref 15–65)
RBC # BLD: 2.63 M/UL — LOW (ref 3.8–5.2)
RBC # FLD: 13.4 % — SIGNIFICANT CHANGE UP (ref 10.3–14.5)
SODIUM SERPL-SCNC: 137 MMOL/L — SIGNIFICANT CHANGE UP (ref 135–145)
TIBC SERPL-MCNC: 252 UG/DL — SIGNIFICANT CHANGE UP (ref 220–430)
UIBC SERPL-MCNC: 215 UG/DL — SIGNIFICANT CHANGE UP (ref 110–370)
WBC # BLD: 6.87 K/UL — SIGNIFICANT CHANGE UP (ref 3.8–10.5)
WBC # FLD AUTO: 6.87 K/UL — SIGNIFICANT CHANGE UP (ref 3.8–10.5)

## 2022-05-07 RX ADMIN — INSULIN GLARGINE 10 UNIT(S): 100 INJECTION, SOLUTION SUBCUTANEOUS at 21:45

## 2022-05-07 RX ADMIN — SENNA PLUS 2 TABLET(S): 8.6 TABLET ORAL at 21:46

## 2022-05-07 RX ADMIN — Medication 1: at 09:17

## 2022-05-07 RX ADMIN — CARVEDILOL PHOSPHATE 6.25 MILLIGRAM(S): 80 CAPSULE, EXTENDED RELEASE ORAL at 17:14

## 2022-05-07 RX ADMIN — Medication 1334 MILLIGRAM(S): at 17:15

## 2022-05-07 RX ADMIN — Medication 1334 MILLIGRAM(S): at 12:30

## 2022-05-07 RX ADMIN — CHLORHEXIDINE GLUCONATE 1 APPLICATION(S): 213 SOLUTION TOPICAL at 12:25

## 2022-05-07 RX ADMIN — Medication 75 MICROGRAM(S): at 05:08

## 2022-05-07 RX ADMIN — Medication 1334 MILLIGRAM(S): at 08:50

## 2022-05-07 RX ADMIN — Medication 81 MILLIGRAM(S): at 12:33

## 2022-05-07 RX ADMIN — PANTOPRAZOLE SODIUM 40 MILLIGRAM(S): 20 TABLET, DELAYED RELEASE ORAL at 05:09

## 2022-05-07 RX ADMIN — Medication 10 MILLIGRAM(S): at 12:33

## 2022-05-07 RX ADMIN — Medication 1 MILLIGRAM(S): at 12:29

## 2022-05-07 RX ADMIN — PREGABALIN 1000 MICROGRAM(S): 225 CAPSULE ORAL at 12:33

## 2022-05-07 RX ADMIN — ATORVASTATIN CALCIUM 20 MILLIGRAM(S): 80 TABLET, FILM COATED ORAL at 21:46

## 2022-05-07 RX ADMIN — POLYETHYLENE GLYCOL 3350 17 GRAM(S): 17 POWDER, FOR SOLUTION ORAL at 12:26

## 2022-05-07 RX ADMIN — LORATADINE 10 MILLIGRAM(S): 10 TABLET ORAL at 12:34

## 2022-05-07 RX ADMIN — CARVEDILOL PHOSPHATE 6.25 MILLIGRAM(S): 80 CAPSULE, EXTENDED RELEASE ORAL at 05:09

## 2022-05-07 RX ADMIN — GABAPENTIN 300 MILLIGRAM(S): 400 CAPSULE ORAL at 12:29

## 2022-05-07 RX ADMIN — Medication 10 MILLIGRAM(S): at 21:46

## 2022-05-07 RX ADMIN — Medication 10 MILLIGRAM(S): at 05:08

## 2022-05-07 NOTE — CONSULT NOTE ADULT - SUBJECTIVE AND OBJECTIVE BOX
Vascular & Interventional Radiology Brief Consult Note    HPI: 66y Female with CKD now requiring HD, DM2, shortness of breath with associated left shoulder pain. Patient planned for AVF creation. Current HD access with via right IJ catheter placed on 5/3    Allergies:   Medications (Abx/Cardiac/Anticoagulation/Blood Products)  aspirin enteric coated: 81 milliGRAM(s) Oral (05-07 @ 12:33)  carvedilol: 6.25 milliGRAM(s) Oral (05-07 @ 17:14)  hydrALAZINE: 10 milliGRAM(s) Oral (05-07 @ 12:33)    Data:    T(C): 36.9  HR: 78  BP: 158/68  RR: 18  SpO2: 98%    -WBC 6.87 / HgB 7.4 / Hct 23.8 / Plt 145  -Na 137 / Cl 103 / BUN 54 / Glucose 87  -K 3.7 / CO2 20 / Cr 3.17  -ALT -- / Alk Phos -- / T.Bili --  -INR1.14    Imaging: n/a     Assessment/Plan:   66y Female with CKD requiring HD. Current access with right IJ nontunneled catheter. Given history , consider conversion to tunneled catheter with vascular surgery at the time of AVF creation to avoid multiple episodes of sedation.   If unable to, will plan for conversion to tunneled HD catheter next week (date to be determined).   Please call IR with any updates or concerns. Pager [phone].   - discussed with primary team.

## 2022-05-07 NOTE — PROGRESS NOTE ADULT - SUBJECTIVE AND OBJECTIVE BOX
[facility] [place] [facility]OLOGY [facility]   MD ANA KAPADIA MD RUORU WONG, PA    TEL:  FROM 9 AM to 5 PM ---OFFICE: [phone]    FROM 5 PM - 9 AM PLEASE CALL ANSWERING SERVICE: [phone]    RENAL FOLLOW UP NOTE--Date of Service 05-07-22 @ 13:51  --------------------------------------------------------------------------------  HPI:      Pt seen and examined at bedside.       PAST HISTORY  --------------------------------------------------------------------------------  No significant changes to PMH, PSH, FHx, SHx, unless otherwise noted    ALLERGIES & MEDICATIONS  --------------------------------------------------------------------------------  Allergies    No Known Allergies    Intolerances      Standing Inpatient Medications  aspirin enteric coated 81 milliGRAM(s) Oral daily  atorvastatin 20 milliGRAM(s) Oral at bedtime  calcium acetate 1334 milliGRAM(s) Oral three times a day with meals  carvedilol 6.25 milliGRAM(s) Oral every 12 hours  chlorhexidine 2% Cloths 1 Application(s) Topical daily  cyanocobalamin 1000 MICROGram(s) Oral daily  dextrose 5%. 1000 milliLiter(s) IV Continuous <Continuous>  dextrose 5%. 1000 milliLiter(s) IV Continuous <Continuous>  dextrose 50% Injectable 25 Gram(s) IV Push once  dextrose 50% Injectable 12.5 Gram(s) IV Push once  dextrose 50% Injectable 25 Gram(s) IV Push once  epoetin denise-epbx (RETACRIT) Injectable 77808 Unit(s) IV Push <User Schedule>  folic acid 1 milliGRAM(s) Oral daily  gabapentin 300 milliGRAM(s) Oral daily  glucagon  Injectable 1 milliGRAM(s) IntraMuscular once  hydrALAZINE 10 milliGRAM(s) Oral three times a day  insulin glargine Injectable (LANTUS) 10 Unit(s) SubCutaneous at bedtime  insulin lispro (ADMELOG) corrective regimen sliding scale   SubCutaneous three times a day before meals  levothyroxine 75 MICROGram(s) Oral daily  loratadine 10 milliGRAM(s) Oral daily  pantoprazole    Tablet 40 milliGRAM(s) Oral before breakfast  polyethylene glycol 3350 17 Gram(s) Oral daily  senna 2 Tablet(s) Oral at bedtime    PRN Inpatient Medications  acetaminophen     Tablet .. 650 milliGRAM(s) Oral every 6 hours PRN  dextrose Oral Gel 15 Gram(s) Oral once PRN  sodium chloride 0.9% lock flush 10 milliLiter(s) IV Push every 1 hour PRN      REVIEW OF SYSTEMS  --------------------------------------------------------------------------------  General: no fever  MSK: no edema     VITALS/PHYSICAL EXAM  --------------------------------------------------------------------------------  T(C): 36.8 (05-07-22 @ 12:30), Max: 36.8 (05-06-22 @ 21:53)  HR: 62 (05-07-22 @ 12:30) (57 - 65)  BP: 176/67 (05-07-22 @ 12:30) (146/63 - 176/67)  RR: 18 (05-07-22 @ 12:30) (18 - 18)  SpO2: 98% (05-07-22 @ 12:30) (98% - 100%)  Wt(kg): --        Physical Exam:  	Gen: NAD  	HEENT: MMM  	Pulm: CTA B/L  	CV: S1S2  	Abd: Soft, +BS  	Ext: No LE edema B/L                      Neuro: Awake   	Skin: Warm and Dry   	Vascular access: NO HD catheter            no  early  LABS/STUDIES  --------------------------------------------------------------------------------              7.4    6.87  >-----------<  145      [05-07-22 @ 06:59]              23.8     137  |  103  |  54  ----------------------------<  87      [05-07-22 @ 07:00]  3.7   |  20  |  3.17        Ca     7.9     [05-07-22 @ 07:00]      Mg     2.20     [05-07-22 @ 07:00]      Phos  4.6     [05-07-22 @ 07:00]            Creatinine Trend:  SCr 3.17 [05-07 @ 07:00]  SCr 3.02 [05-06 @ 07:35]  SCr 3.99 [05-05 @ 08:25]  SCr 3.38 [05-04 @ 06:42]  SCr 5.00 [05-03 @ 11:57]        Iron 37, TIBC 252, %sat 15      [05-07-22 @ 07:00]  Ferritin 83      [05-07-22 @ 07:00]  PTH -- (Ca --)      [05-07-22 @ 06:59]   204  TSH 1.55      [05-04-22 @ 06:42]    HBsAb <3.0      [05-04-22 @ 05:51]  HBsAg Reactive      [05-04-22 @ 05:02]  HBcAb Reactive      [05-04-22 @ 05:51]  HCV 0.13, Nonreact      [05-04-22 @ 05:51]

## 2022-05-07 NOTE — PROGRESS NOTE ADULT - SUBJECTIVE AND OBJECTIVE BOX
Gary Mrorison MD  Interventional Cardiology / Endovascular Specialist  [place] Office : [address] [place] [address] [place] [address]  Tel:   [place] Office : [address] [facility] [place] [address]  Tel: [phone]      Subjective/Overnight events: Patient lying in bed comfortably. No acute distress. Denies chest pain, SOB or palpitations  	  MEDICATIONS:  aspirin enteric coated 81 milliGRAM(s) Oral daily  carvedilol 6.25 milliGRAM(s) Oral every 12 hours  hydrALAZINE 10 milliGRAM(s) Oral three times a day      loratadine 10 milliGRAM(s) Oral daily    acetaminophen     Tablet .. 650 milliGRAM(s) Oral every 6 hours PRN  gabapentin 300 milliGRAM(s) Oral daily    pantoprazole    Tablet 40 milliGRAM(s) Oral before breakfast  polyethylene glycol 3350 17 Gram(s) Oral daily  senna 2 Tablet(s) Oral at bedtime    atorvastatin 20 milliGRAM(s) Oral at bedtime  dextrose 50% Injectable 25 Gram(s) IV Push once  dextrose 50% Injectable 12.5 Gram(s) IV Push once  dextrose 50% Injectable 25 Gram(s) IV Push once  dextrose Oral Gel 15 Gram(s) Oral once PRN  glucagon  Injectable 1 milliGRAM(s) IntraMuscular once  insulin glargine Injectable (LANTUS) 10 Unit(s) SubCutaneous at bedtime  insulin lispro (ADMELOG) corrective regimen sliding scale   SubCutaneous three times a day before meals  levothyroxine 75 MICROGram(s) Oral daily    calcium acetate 1334 milliGRAM(s) Oral three times a day with meals  chlorhexidine 2% Cloths 1 Application(s) Topical daily  cyanocobalamin 1000 MICROGram(s) Oral daily  dextrose 5%. 1000 milliLiter(s) IV Continuous <Continuous>  dextrose 5%. 1000 milliLiter(s) IV Continuous <Continuous>  epoetin denise-epbx (RETACRIT) Injectable 36747 Unit(s) IV Push <User Schedule>  folic acid 1 milliGRAM(s) Oral daily  sodium chloride 0.9% lock flush 10 milliLiter(s) IV Push every 1 hour PRN      PAST MEDICAL/SURGICAL HISTORY  PAST MEDICAL & SURGICAL HISTORY:  HTN (hypertension)    HLD (hyperlipidemia)        SOCIAL HISTORY: Substance Use (street drugs): ( x ) never used  (  ) other:    FAMILY HISTORY:        PHYSICAL EXAM:  T(C): 36.7 (05-07-22 @ 08:30), Max: 36.9 (05-06-22 @ 13:49)  HR: 57 (05-07-22 @ 08:30) (57 - 66)  BP: 159/66 (05-07-22 @ 08:30) (146/63 - 159/66)  RR: 18 (05-07-22 @ 08:30) (18 - 18)  SpO2: 100% (05-07-22 @ 08:30) (99% - 100%)  Wt(kg): --  I&O's Summary        GENERAL: NAD  EYES: EOMI, PERRLA, conjunctiva and sclera clear  ENMT: No tonsillar erythema, exudates, or enlargement  Cardiovascular: Normal S1 S2, No JVD, No murmurs, No edema  Respiratory: Lungs clear to auscultation	  Gastrointestinal:  Soft, Non-tender, + BS	  Extremities: No edema                                  7.4    6.87  )-----------( 145      ( 07 May 2022 06:59 )             23.8     05-07    137  |  103  |  54<H>  ----------------------------<  87  3.7   |  20<L>  |  3.17<H>    Ca    7.9<L>      07 May 2022 07:00  Phos  4.6     05-07  Mg     2.20     05-07      proBNP:   Lipid Profile:   HgA1c:   TSH:     Consultant(s) Notes Reviewed:  [x ] YES  [ ] NO    Care Discussed with Consultants/Other Providers [ x] YES  [ ] NO    Imaging Personally Reviewed independently:  [x] YES  [ ] NO    All labs, radiologic studies, vitals, orders and medications list reviewed. Patient is seen and examined at bedside. Case discussed with medical team.

## 2022-05-07 NOTE — PROGRESS NOTE ADULT - SUBJECTIVE AND OBJECTIVE BOX
Appears comfortable    Vital Signs Last 24 Hrs  T(C): 36.7 (07 May 2022 08:30), Max: 36.9 (06 May 2022 13:49)  T(F): 98 (07 May 2022 08:30), Max: 98.4 (06 May 2022 13:49)  HR: 57 (07 May 2022 08:30) (57 - 66)  BP: 159/66 (07 May 2022 08:30) (146/63 - 159/66)  BP(mean): --  RR: 18 (07 May 2022 08:30) (18 - 18)  SpO2: 100% (07 May 2022 08:30) (99% - 100%)    I&O's Summary      GENERAL: NAD  HEENT: NCAT, EOMI  NECK: Supple, No JVD, No carotid bruits  CHEST/LUNG: Decreased BS @ both bases; no wheezes  HEART: Regular rate and rhythm; nl S1/S2; No murmurs, rubs, or gallops  ABDOMEN: Soft, Nontender, Nondistended; Bowel sounds present; No hepatosplenomegaly  EXTREMITIES:  2+ Peripheral Pulses; residual b/l LE edema       LABS:                        7.4    6.87  )-----------( 145      ( 07 May 2022 06:59 )             23.8     05-07    137  |  103  |  54<H>  ----------------------------<  87  3.7   |  20<L>  |  3.17<H>    Ca    7.9<L>      07 May 2022 07:00  Phos  4.6     05-07  Mg     2.20     05-07        CAPILLARY BLOOD GLUCOSE      POCT Blood Glucose.: 158 mg/dL (07 May 2022 09:00)  POCT Blood Glucose.: 191 mg/dL (06 May 2022 22:20)  POCT Blood Glucose.: 106 mg/dL (06 May 2022 17:46)  POCT Blood Glucose.: 124 mg/dL (06 May 2022 12:29)            RADIOLOGY & ADDITIONAL TESTS:    Imaging Personally Reviewed:  [x] YES  [ ] NO    Will obtain old records:  [ ] YES  [x] NO

## 2022-05-07 NOTE — PROGRESS NOTE ADULT - ASSESSMENT
66F PMH CKD 5 not on hd, HTN, HLD, DM2, hypothyroid recent hospital admission Mar 2022 with plans for starting outpt dialysis presenting with fluid overload, worsen renal function, need to initiate dialysis,    CKD stage 5 now on HD ---> ESRD on HD 5/3/2022  now with worsen renal function, mild uremic symptom and fluid overload  initiated HD 5/3 UF 1L tolerated well  s/p shiely 5/3 with IR  will need permacath for dc planning  f/u vascular for AVF planning  Plan for HD today  consent in chart. all question answered. daughter agreeable  - Monitor BMP   - Avoid nephrotoxics, NSIADS RCA    FLuid overload  sec to renal failure  UF with hd as tolerated  f/u cardio    HTN   hypotensive 5/4 nifedipine d/c'd  if bp elevated >150 start losartan 25 daily  monitor BP       anemia  check iron panel  r/o GIB  epo with hd  transfuse to keep hb>7    hyponatremia  likely fluid overload  free water restriction <1L/day    Hyperphosphatemia  continue  binders  low PO4 diet  monitor    hypocalcemia  check pth  monitor    hypokalemia  supplemented  montior  hd with high k bath    Hepatitis B  known  per daughter did not follow up with hepatology  check hep b PCR  hepatology eval if viral load high

## 2022-05-08 LAB
ANION GAP SERPL CALC-SCNC: 14 MMOL/L — SIGNIFICANT CHANGE UP (ref 7–14)
BUN SERPL-MCNC: 31 MG/DL — HIGH (ref 7–23)
CALCIUM SERPL-MCNC: 8.7 MG/DL — SIGNIFICANT CHANGE UP (ref 8.4–10.5)
CHLORIDE SERPL-SCNC: 100 MMOL/L — SIGNIFICANT CHANGE UP (ref 98–107)
CO2 SERPL-SCNC: 23 MMOL/L — SIGNIFICANT CHANGE UP (ref 22–31)
CREAT SERPL-MCNC: 2.71 MG/DL — HIGH (ref 0.5–1.3)
EGFR: 19 ML/MIN/1.73M2 — LOW
GLUCOSE SERPL-MCNC: 113 MG/DL — HIGH (ref 70–99)
HCT VFR BLD CALC: 28.7 % — LOW (ref 34.5–45)
HGB BLD-MCNC: 9.1 G/DL — LOW (ref 11.5–15.5)
MAGNESIUM SERPL-MCNC: 2.1 MG/DL — SIGNIFICANT CHANGE UP (ref 1.6–2.6)
MCHC RBC-ENTMCNC: 28.8 PG — SIGNIFICANT CHANGE UP (ref 27–34)
MCHC RBC-ENTMCNC: 31.7 GM/DL — LOW (ref 32–36)
MCV RBC AUTO: 90.8 FL — SIGNIFICANT CHANGE UP (ref 80–100)
NRBC # BLD: 0 /100 WBCS — SIGNIFICANT CHANGE UP
NRBC # FLD: 0 K/UL — SIGNIFICANT CHANGE UP
PHOSPHATE SERPL-MCNC: 3.9 MG/DL — SIGNIFICANT CHANGE UP (ref 2.5–4.5)
PLATELET # BLD AUTO: 164 K/UL — SIGNIFICANT CHANGE UP (ref 150–400)
POTASSIUM SERPL-MCNC: 3.8 MMOL/L — SIGNIFICANT CHANGE UP (ref 3.5–5.3)
POTASSIUM SERPL-SCNC: 3.8 MMOL/L — SIGNIFICANT CHANGE UP (ref 3.5–5.3)
RBC # BLD: 3.16 M/UL — LOW (ref 3.8–5.2)
RBC # FLD: 13.3 % — SIGNIFICANT CHANGE UP (ref 10.3–14.5)
SODIUM SERPL-SCNC: 137 MMOL/L — SIGNIFICANT CHANGE UP (ref 135–145)
WBC # BLD: 7.19 K/UL — SIGNIFICANT CHANGE UP (ref 3.8–10.5)
WBC # FLD AUTO: 7.19 K/UL — SIGNIFICANT CHANGE UP (ref 3.8–10.5)

## 2022-05-08 RX ADMIN — Medication 1334 MILLIGRAM(S): at 17:08

## 2022-05-08 RX ADMIN — Medication 1 MILLIGRAM(S): at 12:27

## 2022-05-08 RX ADMIN — SENNA PLUS 2 TABLET(S): 8.6 TABLET ORAL at 22:47

## 2022-05-08 RX ADMIN — Medication 10 MILLIGRAM(S): at 12:27

## 2022-05-08 RX ADMIN — Medication 81 MILLIGRAM(S): at 12:29

## 2022-05-08 RX ADMIN — CARVEDILOL PHOSPHATE 6.25 MILLIGRAM(S): 80 CAPSULE, EXTENDED RELEASE ORAL at 05:45

## 2022-05-08 RX ADMIN — Medication 10 MILLIGRAM(S): at 22:46

## 2022-05-08 RX ADMIN — Medication 1334 MILLIGRAM(S): at 12:27

## 2022-05-08 RX ADMIN — PANTOPRAZOLE SODIUM 40 MILLIGRAM(S): 20 TABLET, DELAYED RELEASE ORAL at 05:45

## 2022-05-08 RX ADMIN — LORATADINE 10 MILLIGRAM(S): 10 TABLET ORAL at 12:28

## 2022-05-08 RX ADMIN — ATORVASTATIN CALCIUM 20 MILLIGRAM(S): 80 TABLET, FILM COATED ORAL at 22:47

## 2022-05-08 RX ADMIN — INSULIN GLARGINE 10 UNIT(S): 100 INJECTION, SOLUTION SUBCUTANEOUS at 22:46

## 2022-05-08 RX ADMIN — CARVEDILOL PHOSPHATE 6.25 MILLIGRAM(S): 80 CAPSULE, EXTENDED RELEASE ORAL at 17:08

## 2022-05-08 RX ADMIN — CHLORHEXIDINE GLUCONATE 1 APPLICATION(S): 213 SOLUTION TOPICAL at 12:24

## 2022-05-08 RX ADMIN — Medication 10 MILLIGRAM(S): at 05:46

## 2022-05-08 RX ADMIN — PREGABALIN 1000 MICROGRAM(S): 225 CAPSULE ORAL at 12:29

## 2022-05-08 RX ADMIN — Medication 1334 MILLIGRAM(S): at 08:46

## 2022-05-08 RX ADMIN — Medication 1: at 18:19

## 2022-05-08 RX ADMIN — Medication 75 MICROGRAM(S): at 05:45

## 2022-05-08 RX ADMIN — Medication 1: at 12:50

## 2022-05-08 RX ADMIN — POLYETHYLENE GLYCOL 3350 17 GRAM(S): 17 POWDER, FOR SOLUTION ORAL at 12:29

## 2022-05-08 RX ADMIN — Medication 1: at 08:58

## 2022-05-08 RX ADMIN — GABAPENTIN 300 MILLIGRAM(S): 400 CAPSULE ORAL at 12:28

## 2022-05-08 NOTE — PROGRESS NOTE ADULT - ASSESSMENT
66F PMH CKD 5 not on hd, HTN, HLD, DM2, hypothyroid recent hospital admission Mar 2022 with plans for starting outpt dialysis presenting with fluid overload, worsen renal function, need to initiate dialysis,    CKD stage 5 now on HD ---> ESRD on HD 5/3/2022  now with worsen renal function, mild uremic symptom and fluid overload  initiated HD 5/3   s/p shiely 5/3 with IR  will need permacath for dc planning  AVF planned for 5/9 vs 5/10 per vascular  s/p HD On 5/7 with 1.5 LUF  consent in chart. all question answered. daughter agreeable  - Monitor BMP   - Avoid nephrotoxics, NSIADS RCA    FLuid overload  sec to renal failure  UF with hd as tolerated  f/u cardio    HTN   hypotensive 5/4 nifedipine d/c'd  if bp elevated >150 start losartan 25 daily  monitor BP       anemia  check iron panel  r/o GIB  epo with hd  transfuse to keep hb>7    hyponatremia  likely fluid overload  free water restriction <1L/day    Hyperphosphatemia  continue  binders  low PO4 diet  monitor    hypocalcemia  check pth  monitor    hypokalemia  montior  hd with high k bath    Hepatitis B  known  per daughter did not follow up with hepatology  check hep b PCR  hepatology eval if viral load high

## 2022-05-08 NOTE — PROGRESS NOTE ADULT - SUBJECTIVE AND OBJECTIVE BOX
[facility]   MD ANA KAPADIA MD RUORU WONG, PA    TEL:  FROM 9 AM to 5 PM ---OFFICE: [phone]    FROM 5 PM - 9 AM PLEASE CALL ANSWERING SERVICE: [phone]    RENAL FOLLOW UP NOTE--Date of Service 05-08-22 @ 11:42  --------------------------------------------------------------------------------  HPI:      Pt seen and examined at bedside.       PAST HISTORY  --------------------------------------------------------------------------------  No significant changes to PMH, PSH, FHx, SHx, unless otherwise noted    ALLERGIES & MEDICATIONS  --------------------------------------------------------------------------------  Allergies    No Known Allergies    Intolerances      Standing Inpatient Medications  aspirin enteric coated 81 milliGRAM(s) Oral daily  atorvastatin 20 milliGRAM(s) Oral at bedtime  calcium acetate 1334 milliGRAM(s) Oral three times a day with meals  carvedilol 6.25 milliGRAM(s) Oral every 12 hours  chlorhexidine 2% Cloths 1 Application(s) Topical daily  cyanocobalamin 1000 MICROGram(s) Oral daily  dextrose 5%. 1000 milliLiter(s) IV Continuous <Continuous>  dextrose 5%. 1000 milliLiter(s) IV Continuous <Continuous>  dextrose 50% Injectable 25 Gram(s) IV Push once  dextrose 50% Injectable 12.5 Gram(s) IV Push once  dextrose 50% Injectable 25 Gram(s) IV Push once  epoetin denise-epbx (RETACRIT) Injectable 14487 Unit(s) IV Push <User Schedule>  folic acid 1 milliGRAM(s) Oral daily  gabapentin 300 milliGRAM(s) Oral daily  glucagon  Injectable 1 milliGRAM(s) IntraMuscular once  hydrALAZINE 10 milliGRAM(s) Oral three times a day  insulin glargine Injectable (LANTUS) 10 Unit(s) SubCutaneous at bedtime  insulin lispro (ADMELOG) corrective regimen sliding scale   SubCutaneous three times a day before meals  levothyroxine 75 MICROGram(s) Oral daily  loratadine 10 milliGRAM(s) Oral daily  pantoprazole    Tablet 40 milliGRAM(s) Oral before breakfast  polyethylene glycol 3350 17 Gram(s) Oral daily  senna 2 Tablet(s) Oral at bedtime    PRN Inpatient Medications  acetaminophen     Tablet .. 650 milliGRAM(s) Oral every 6 hours PRN  dextrose Oral Gel 15 Gram(s) Oral once PRN  sodium chloride 0.9% lock flush 10 milliLiter(s) IV Push every 1 hour PRN      REVIEW OF SYSTEMS  --------------------------------------------------------------------------------  General: no fever    MSK: + edema     VITALS/PHYSICAL EXAM  --------------------------------------------------------------------------------  T(C): 36.7 (05-08-22 @ 05:45), Max: 36.9 (05-07-22 @ 17:10)  HR: 58 (05-08-22 @ 05:45) (57 - 78)  BP: 148/60 (05-08-22 @ 05:45) (138/62 - 176/67)  RR: 17 (05-08-22 @ 05:45) (17 - 18)  SpO2: 100% (05-08-22 @ 05:45) (98% - 100%)  Wt(kg): --        05-07-22 @ 07:01  -  05-08-22 @ 07:00  --------------------------------------------------------  IN: 400 mL / OUT: 1900 mL / NET: -1500 mL      Physical Exam:  	Gen: NAD  	HEENT: MMM  	Pulm: CTA B/L  	CV: S1S2  	Abd: Soft, +BS  	Ext: + LE edema B/L                      Neuro: Awake   	Skin: Warm and Dry   	Vascular access:  HD catheter             no sharath  LABS/STUDIES  --------------------------------------------------------------------------------              9.1    7.19  >-----------<  164      [05-08-22 @ 06:25]              28.7     137  |  100  |  31  ----------------------------<  113      [05-08-22 @ 06:25]  3.8   |  23  |  2.71        Ca     8.7     [05-08-22 @ 06:25]      Mg     2.10     [05-08-22 @ 06:25]      Phos  3.9     [05-08-22 @ 06:25]            Creatinine Trend:  SCr 2.71 [05-08 @ 06:25]  SCr 3.17 [05-07 @ 07:00]  SCr 3.02 [05-06 @ 07:35]  SCr 3.99 [05-05 @ 08:25]  SCr 3.38 [05-04 @ 06:42]        Iron 37, TIBC 252, %sat 15      [05-07-22 @ 07:00]  Ferritin 83      [05-07-22 @ 07:00]  PTH -- (Ca --)      [05-07-22 @ 06:59]   204  TSH 1.55      [05-04-22 @ 06:42]    HBsAb <3.0      [05-04-22 @ 05:51]  HBsAg Reactive      [05-04-22 @ 05:02]  HBcAb Reactive      [05-04-22 @ 05:51]  HCV 0.13, Nonreact      [05-04-22 @ 05:51]

## 2022-05-08 NOTE — PROGRESS NOTE ADULT - SUBJECTIVE AND OBJECTIVE BOX
LABS:                        9.1    7.19  )-----------( 164      ( 08 May 2022 06:25 )             28.7     05-08    137  |  100  |  31<H>  ----------------------------<  113<H>  3.8   |  23  |  2.71<H>    Ca    8.7      08 May 2022 06:25  Phos  3.9     05-08  Mg     2.10     05-08        CAPILLARY BLOOD GLUCOSE      POCT Blood Glucose.: 169 mg/dL (07 May 2022 21:42)  POCT Blood Glucose.: 119 mg/dL (07 May 2022 17:57)  POCT Blood Glucose.: 101 mg/dL (07 May 2022 12:44)  POCT Blood Glucose.: 158 mg/dL (07 May 2022 09:00)            RADIOLOGY & ADDITIONAL TESTS:    Imaging Personally Reviewed:  [x] YES  [ ] NO    Will obtain old records:  [ ] YES  [x] NONo fevers or chills    Vital Signs Last 24 Hrs  T(C): 36.7 (08 May 2022 05:45), Max: 36.9 (07 May 2022 17:10)  T(F): 98 (08 May 2022 05:45), Max: 98.4 (07 May 2022 17:10)  HR: 58 (08 May 2022 05:45) (53 - 78)  BP: 148/60 (08 May 2022 05:45) (138/62 - 176/71)  BP(mean): --  RR: 17 (08 May 2022 05:45) (17 - 18)  SpO2: 100% (08 May 2022 05:45) (98% - 100%)    I&O's Summary    05-07-22 @ 07:01  -  05-08-22 @ 07:00  --------------------------------------------------------  IN: 400 mL / OUT: 1900 mL / NET: -1500 mL        GENERAL: NAD  HEENT: NCAT, EOMI  NECK: Supple, No JVD, No carotid bruits  CHEST/LUNG: Decreased BS @ both bases; no wheezes  HEART: Regular rate and rhythm; nl S1/S2; No murmurs, rubs, or gallops  ABDOMEN: Soft, Nontender, Nondistended; Bowel sounds present; No hepatosplenomegaly  EXTREMITIES:  2+ Peripheral Pulses; residual b/l LE edema      No fevers or chills    Vital Signs Last 24 Hrs  T(C): 36.7 (08 May 2022 05:45), Max: 36.9 (07 May 2022 17:10)  T(F): 98 (08 May 2022 05:45), Max: 98.4 (07 May 2022 17:10)  HR: 58 (08 May 2022 05:45) (53 - 78)  BP: 148/60 (08 May 2022 05:45) (138/62 - 176/71)  BP(mean): --  RR: 17 (08 May 2022 05:45) (17 - 18)  SpO2: 100% (08 May 2022 05:45) (98% - 100%)    I&O's Summary    05-07-22 @ 07:01  -  05-08-22 @ 07:00  --------------------------------------------------------  IN: 400 mL / OUT: 1900 mL / NET: -1500 mL        GENERAL: NAD  HEENT: NCAT, EOMI  NECK: Supple, No JVD, No carotid bruits  CHEST/LUNG: Decreased BS @ both bases; no wheezes  HEART: Regular rate and rhythm; nl S1/S2; No murmurs, rubs, or gallops  ABDOMEN: Soft, Nontender, Nondistended; Bowel sounds present; No hepatosplenomegaly  EXTREMITIES:  2+ Peripheral Pulses; residual b/l LE edema     LABS:                        9.1    7.19  )-----------( 164      ( 08 May 2022 06:25 )             28.7     05-08    137  |  100  |  31<H>  ----------------------------<  113<H>  3.8   |  23  |  2.71<H>    Ca    8.7      08 May 2022 06:25  Phos  3.9     05-08  Mg     2.10     05-08        CAPILLARY BLOOD GLUCOSE      POCT Blood Glucose.: 169 mg/dL (07 May 2022 21:42)  POCT Blood Glucose.: 119 mg/dL (07 May 2022 17:57)  POCT Blood Glucose.: 101 mg/dL (07 May 2022 12:44)  POCT Blood Glucose.: 158 mg/dL (07 May 2022 09:00)            RADIOLOGY & ADDITIONAL TESTS:    Imaging Personally Reviewed:  [x] YES  [ ] NO    Will obtain old records:  [ ] YES  [x] NO

## 2022-05-08 NOTE — PROGRESS NOTE ADULT - ASSESSMENT
65 yo female with ho HTN, DM, HLD, CKD, hypothyroidism p/e progressive SOB over last 3 days with LE edema, abd bloating and periorbital edema c/w ESRD with fluid overload.  Pt to start HD today    Problem/Plan - 1:  ·  Problem: ESRD on hemodialysis.   ·  Plan: pt with advanced CKD now req HD, to start today  IR to place non tunneled catheter today for HD   vasc consulted for AVF tentatively 5/10/22  HD per nephrology  pt rec'd lasix 40 mg in ED  cont Ca acetate, oral bicarb for now  Cardiac clearance appreciated  IR recommended consideration of conversion to PC during AVF creation to avoid repeated risks of sedation  Pt is medically optimized for AVF and may proceed with procedure    Problem/Plan - 2:  ·  Problem: HTN (hypertension).   ·  Plan: cont coreg, nifedipine, hydralazine with hold parameters  cont ASA  elevaated trop with no delta, likely due to renal dz, pt without CP.    Problem/Plan - 3:  ·  Problem: Diabetes mellitus, type 2.   ·  Plan: pt on lantus, 70/30, trulicity at home  will cont lantus here with sliding scale for now  adjust as needed  a1c = 5.6    Problem/Plan - 4:  ·  Problem: Hypothyroidism.   ·  Plan: cont synthroid  TSH = 1.55.    Problem/Plan - 5:  ·  Problem: Anemia of chronic disease.   ·  Plan: likely multifactorial  B12 def, ESRD.

## 2022-05-08 NOTE — PROGRESS NOTE ADULT - SUBJECTIVE AND OBJECTIVE BOX
Gary Morrison MD  Interventional Cardiology / Endovascular Specialist  [place] Office : [address] [place] [address] [place] [address]  Tel:   [place] Office : [address] [facility] [place] [address]  Tel: [phone]      Subjective/Overnight events: Patient lying in bed comfortably. No acute distress. Denies chest pain, SOB or palpitations  	  MEDICATIONS:  aspirin enteric coated 81 milliGRAM(s) Oral daily  carvedilol 6.25 milliGRAM(s) Oral every 12 hours  hydrALAZINE 10 milliGRAM(s) Oral three times a day      loratadine 10 milliGRAM(s) Oral daily    acetaminophen     Tablet .. 650 milliGRAM(s) Oral every 6 hours PRN  gabapentin 300 milliGRAM(s) Oral daily    pantoprazole    Tablet 40 milliGRAM(s) Oral before breakfast  polyethylene glycol 3350 17 Gram(s) Oral daily  senna 2 Tablet(s) Oral at bedtime    atorvastatin 20 milliGRAM(s) Oral at bedtime  dextrose 50% Injectable 25 Gram(s) IV Push once  dextrose 50% Injectable 12.5 Gram(s) IV Push once  dextrose 50% Injectable 25 Gram(s) IV Push once  dextrose Oral Gel 15 Gram(s) Oral once PRN  glucagon  Injectable 1 milliGRAM(s) IntraMuscular once  insulin glargine Injectable (LANTUS) 10 Unit(s) SubCutaneous at bedtime  insulin lispro (ADMELOG) corrective regimen sliding scale   SubCutaneous three times a day before meals  levothyroxine 75 MICROGram(s) Oral daily    calcium acetate 1334 milliGRAM(s) Oral three times a day with meals  chlorhexidine 2% Cloths 1 Application(s) Topical daily  cyanocobalamin 1000 MICROGram(s) Oral daily  dextrose 5%. 1000 milliLiter(s) IV Continuous <Continuous>  dextrose 5%. 1000 milliLiter(s) IV Continuous <Continuous>  epoetin denise-epbx (RETACRIT) Injectable 57362 Unit(s) IV Push <User Schedule>  folic acid 1 milliGRAM(s) Oral daily  sodium chloride 0.9% lock flush 10 milliLiter(s) IV Push every 1 hour PRN      PAST MEDICAL/SURGICAL HISTORY  PAST MEDICAL & SURGICAL HISTORY:  HTN (hypertension)    HLD (hyperlipidemia)        SOCIAL HISTORY: Substance Use (street drugs): ( x ) never used  (  ) other:    FAMILY HISTORY:          PHYSICAL EXAM:  T(C): 36.8 (05-08-22 @ 12:25), Max: 36.9 (05-07-22 @ 17:10)  HR: 72 (05-08-22 @ 12:25) (57 - 78)  BP: 146/60 (05-08-22 @ 12:25) (138/62 - 158/68)  RR: 18 (05-08-22 @ 12:25) (17 - 18)  SpO2: 98% (05-08-22 @ 12:25) (98% - 100%)  Wt(kg): --  I&O's Summary    07 May 2022 07:01  -  08 May 2022 07:00  --------------------------------------------------------  IN: 400 mL / OUT: 1900 mL / NET: -1500 mL          GENERAL: NAD  EYES: EOMI, PERRLA, conjunctiva and sclera clear  ENMT: No tonsillar erythema, exudates, or enlargement  Cardiovascular: Normal S1 S2, No JVD, No murmurs, No edema  Respiratory: Lungs clear to auscultation	  Gastrointestinal:  Soft, Non-tender, + BS	  Extremities: No edema                              9.1    7.19  )-----------( 164      ( 08 May 2022 06:25 )             28.7     05-08    137  |  100  |  31<H>  ----------------------------<  113<H>  3.8   |  23  |  2.71<H>    Ca    8.7      08 May 2022 06:25  Phos  3.9     05-08  Mg     2.10     05-08      proBNP:   Lipid Profile:   HgA1c:   TSH:     Consultant(s) Notes Reviewed:  [x ] YES  [ ] NO    Care Discussed with Consultants/Other Providers [ x] YES  [ ] NO    Imaging Personally Reviewed independently:  [x] YES  [ ] NO    All labs, radiologic studies, vitals, orders and medications list reviewed. Patient is seen and examined at bedside. Case discussed with medical team.

## 2022-05-08 NOTE — PROGRESS NOTE ADULT - ASSESSMENT
66F PMH CKD, HTN, HLD, DM2, hypothyroid recent hospital admission Mar 2022 with plans for starting outpt dialysis presenting with 3 days of dyspnea at rest, associated with L shoulder pain, nausea, periorbital edema, b/l LE swelling, and abdominal bloating.    Tele: NSR    1. SOB  -likely 2/2 CKD   -trops elevated but flat 2/2 renal disease  -echo normal 3/2022 LV/RV , sever Phtn. stress 3/2022 with no ischemia   -improved on HD     2.  CKD  - on HD  - f/u renal recs    3. HTN  -BPcontrolled  -on hydral 10mg TID  -continue to monitor BP    4. Bradycardia   - asymptomatic monitor on tele   - hypokalemia s/p repletion     5. Pre-op assessment  -plan for AVF creation  -denies CP, SOB or palpitations  -recent stress 3/2022 no evidence of infarct or ischemia  -optimized from cardiac standpoint, RCRI class III, 10.1% risk of 30 day MACE    DVT prophylaxis  -hep subq

## 2022-05-09 ENCOUNTER — TRANSCRIPTION ENCOUNTER (OUTPATIENT)
Age: 67
End: 2022-05-09

## 2022-05-09 LAB
ANION GAP SERPL CALC-SCNC: 12 MMOL/L — SIGNIFICANT CHANGE UP (ref 7–14)
BUN SERPL-MCNC: 46 MG/DL — HIGH (ref 7–23)
CALCIUM SERPL-MCNC: 8.2 MG/DL — LOW (ref 8.4–10.5)
CHLORIDE SERPL-SCNC: 103 MMOL/L — SIGNIFICANT CHANGE UP (ref 98–107)
CO2 SERPL-SCNC: 22 MMOL/L — SIGNIFICANT CHANGE UP (ref 22–31)
CREAT SERPL-MCNC: 3.15 MG/DL — HIGH (ref 0.5–1.3)
EGFR: 15 ML/MIN/1.73M2 — LOW
GLUCOSE SERPL-MCNC: 115 MG/DL — HIGH (ref 70–99)
HCT VFR BLD CALC: 25.8 % — LOW (ref 34.5–45)
HGB BLD-MCNC: 8 G/DL — LOW (ref 11.5–15.5)
MAGNESIUM SERPL-MCNC: 2.1 MG/DL — SIGNIFICANT CHANGE UP (ref 1.6–2.6)
MCHC RBC-ENTMCNC: 27.8 PG — SIGNIFICANT CHANGE UP (ref 27–34)
MCHC RBC-ENTMCNC: 31 GM/DL — LOW (ref 32–36)
MCV RBC AUTO: 89.6 FL — SIGNIFICANT CHANGE UP (ref 80–100)
NRBC # BLD: 0 /100 WBCS — SIGNIFICANT CHANGE UP
NRBC # FLD: 0 K/UL — SIGNIFICANT CHANGE UP
PHOSPHATE SERPL-MCNC: 4.3 MG/DL — SIGNIFICANT CHANGE UP (ref 2.5–4.5)
PLATELET # BLD AUTO: 156 K/UL — SIGNIFICANT CHANGE UP (ref 150–400)
POTASSIUM SERPL-MCNC: 4 MMOL/L — SIGNIFICANT CHANGE UP (ref 3.5–5.3)
POTASSIUM SERPL-SCNC: 4 MMOL/L — SIGNIFICANT CHANGE UP (ref 3.5–5.3)
RBC # BLD: 2.88 M/UL — LOW (ref 3.8–5.2)
RBC # FLD: 13.3 % — SIGNIFICANT CHANGE UP (ref 10.3–14.5)
SARS-COV-2 RNA SPEC QL NAA+PROBE: SIGNIFICANT CHANGE UP
SODIUM SERPL-SCNC: 137 MMOL/L — SIGNIFICANT CHANGE UP (ref 135–145)
WBC # BLD: 6.23 K/UL — SIGNIFICANT CHANGE UP (ref 3.8–10.5)
WBC # FLD AUTO: 6.23 K/UL — SIGNIFICANT CHANGE UP (ref 3.8–10.5)

## 2022-05-09 RX ORDER — IRON SUCROSE 20 MG/ML
100 INJECTION, SOLUTION INTRAVENOUS
Refills: 0 | Status: DISCONTINUED | OUTPATIENT
Start: 2022-05-09 | End: 2022-05-14

## 2022-05-09 RX ORDER — HYDRALAZINE HCL 50 MG
25 TABLET ORAL THREE TIMES A DAY
Refills: 0 | Status: DISCONTINUED | OUTPATIENT
Start: 2022-05-09 | End: 2022-05-14

## 2022-05-09 RX ORDER — LOSARTAN POTASSIUM 100 MG/1
25 TABLET, FILM COATED ORAL DAILY
Refills: 0 | Status: DISCONTINUED | OUTPATIENT
Start: 2022-05-09 | End: 2022-05-14

## 2022-05-09 RX ADMIN — LORATADINE 10 MILLIGRAM(S): 10 TABLET ORAL at 14:01

## 2022-05-09 RX ADMIN — Medication 1334 MILLIGRAM(S): at 18:29

## 2022-05-09 RX ADMIN — Medication 1: at 08:36

## 2022-05-09 RX ADMIN — Medication 81 MILLIGRAM(S): at 14:00

## 2022-05-09 RX ADMIN — PREGABALIN 1000 MICROGRAM(S): 225 CAPSULE ORAL at 14:00

## 2022-05-09 RX ADMIN — POLYETHYLENE GLYCOL 3350 17 GRAM(S): 17 POWDER, FOR SOLUTION ORAL at 14:01

## 2022-05-09 RX ADMIN — Medication 1334 MILLIGRAM(S): at 14:01

## 2022-05-09 RX ADMIN — Medication 75 MICROGRAM(S): at 06:34

## 2022-05-09 RX ADMIN — ATORVASTATIN CALCIUM 20 MILLIGRAM(S): 80 TABLET, FILM COATED ORAL at 22:17

## 2022-05-09 RX ADMIN — Medication 3: at 18:28

## 2022-05-09 RX ADMIN — PANTOPRAZOLE SODIUM 40 MILLIGRAM(S): 20 TABLET, DELAYED RELEASE ORAL at 06:34

## 2022-05-09 RX ADMIN — CARVEDILOL PHOSPHATE 6.25 MILLIGRAM(S): 80 CAPSULE, EXTENDED RELEASE ORAL at 06:34

## 2022-05-09 RX ADMIN — SENNA PLUS 2 TABLET(S): 8.6 TABLET ORAL at 22:14

## 2022-05-09 RX ADMIN — Medication 1 MILLIGRAM(S): at 14:00

## 2022-05-09 RX ADMIN — Medication 1334 MILLIGRAM(S): at 08:39

## 2022-05-09 RX ADMIN — Medication 25 MILLIGRAM(S): at 22:17

## 2022-05-09 RX ADMIN — Medication 10 MILLIGRAM(S): at 06:34

## 2022-05-09 RX ADMIN — Medication 25 MILLIGRAM(S): at 14:00

## 2022-05-09 RX ADMIN — CARVEDILOL PHOSPHATE 6.25 MILLIGRAM(S): 80 CAPSULE, EXTENDED RELEASE ORAL at 18:29

## 2022-05-09 RX ADMIN — GABAPENTIN 300 MILLIGRAM(S): 400 CAPSULE ORAL at 14:01

## 2022-05-09 RX ADMIN — CHLORHEXIDINE GLUCONATE 1 APPLICATION(S): 213 SOLUTION TOPICAL at 14:02

## 2022-05-09 RX ADMIN — INSULIN GLARGINE 10 UNIT(S): 100 INJECTION, SOLUTION SUBCUTANEOUS at 22:14

## 2022-05-09 NOTE — PROGRESS NOTE ADULT - ASSESSMENT
67 yo female with ho HTN, DM, HLD, CKD, hypothyroidism p/e progressive SOB over last 3 days with LE edema, abd bloating and periorbital edema c/w ESRD with fluid overload.  Pt to start HD today    Problem/Plan - 1:  ·  Problem: ESRD on hemodialysis.   ·  Plan: pt with advanced CKD now req HD, to start today  IR placed non tunneled catheter 5/3/22 for HD   vasc consulted for AVF tentatively 5/10/22  HD per nephrology  pt rec'd lasix 40 mg in ED  cont Ca acetate, oral bicarb for now  Cardiac clearance appreciated  IR recommended consideration of conversion to PC during AVF creation to avoid repeated risks of sedation  Pt is medically optimized for AVF and may proceed with procedure    Problem/Plan - 2:  ·  Problem: HTN (hypertension).   ·  Plan: cont coreg, nifedipine, hydralazine with hold parameters  cont ASA  elevaated trop with no delta, likely due to renal dz, pt without CP.    Problem/Plan - 3:  ·  Problem: Diabetes mellitus, type 2.   ·  Plan: pt on lantus, 70/30, trulicity at home  will cont lantus here with sliding scale for now  adjust as needed  a1c = 5.6    Problem/Plan - 4:  ·  Problem: Hypothyroidism.   ·  Plan: cont synthroid  TSH = 1.55.    Problem/Plan - 5:  ·  Problem: Anemia of chronic disease.   ·  Plan: likely multifactorial  B12 def, ESRD.

## 2022-05-09 NOTE — PROGRESS NOTE ADULT - ASSESSMENT
66F PMH CKD 5 not on hd, HTN, HLD, DM2, hypothyroid recent hospital admission Mar 2022 with plans for starting outpt dialysis presenting with fluid overload, worsen renal function, need to initiate dialysis,    CKD stage 5 now on HD ---> ESRD on HD 5/3/2022  now with worsen renal function, mild uremic symptom and fluid overload  initiated HD 5/3   s/p shiely 5/3 with IR  will need permacath for dc planning  AVF planned for 5/10 per vascular  s/p HD On 5/7 with 1.5 LUF  Seen in HD today. Continue HD as ordered. tolerating well. vitals stable. access working well   consent in chart. all question answered. daughter agreeable  - Monitor BMP   - Avoid nephrotoxics, NSIADS RCA    FLuid overload  sec to renal failure  UF with hd as tolerated  f/u cardio    HTN   hypotensive 5/4 nifedipine d/c'd   today bp elevated >150 start losartan 25 daily  monitor BP       anemia  iron sat 15%  will start iron with hd  epo with hd once bp is better  monitor  transfuse to keep hb>7    hyponatremia  likely fluid overload  free water restriction <1L/day    Hyperphosphatemia  continue  binders  low PO4 diet  monitor    hypocalcemia  check pth  monitor    hypokalemia  montior  hd with high k bath    Hepatitis B  known  per daughter did not follow up with hepatology  +hep b PCR  consider hepatology eval    66F PMH CKD 5 not on hd, HTN, HLD, DM2, hypothyroid recent hospital admission Mar 2022 with plans for starting outpt dialysis presenting with fluid overload, worsen renal function, need to initiate dialysis,    CKD stage 5 now on HD ---> ESRD on HD 5/3/2022  now with worsen renal function, mild uremic symptom and fluid overload  initiated HD 5/3   s/p shiely 5/3 with IR  will need permacath for dc planning IR consulted  AVF planned for 5/10 per vascular  s/p HD On 5/7 with 1.5 LUF  Seen in HD today. Continue HD as ordered. tolerating well. vitals stable. access working well   consent in chart. all question answered. daughter agreeable  - Monitor BMP   - Avoid nephrotoxics, NSIADS RCA    FLuid overload  sec to renal failure  UF with hd as tolerated  f/u cardio    HTN   hypotensive 5/4 nifedipine d/c'd   today bp elevated >150 start losartan 25 daily  monitor BP       anemia  iron sat 15%  will start iron with hd  epo with hd once bp is better  monitor  transfuse to keep hb>7    hyponatremia  likely fluid overload  free water restriction <1L/day    Hyperphosphatemia  continue  binders  low PO4 diet  monitor    hypocalcemia  check pth  monitor    hypokalemia  montior  hd with high k bath    Hepatitis B  known  per daughter did not follow up with hepatology  +hep b PCR  consider hepatology eval    66F PMH CKD 5 not on hd, HTN, HLD, DM2, hypothyroid recent hospital admission Mar 2022 with plans for starting outpt dialysis presenting with fluid overload, worsen renal function, need to initiate dialysis,    CKD stage 5 now on HD ---> ESRD on HD 5/3/2022  now with worsen renal function, mild uremic symptom and fluid overload  initiated HD 5/3   s/p shiely 5/3 with IR  will need permacath for dc planning IR consulted  AVF planned for 5/10 per vascular  s/p HD On 5/7 with 1.5 LUF  Seen in HD today. Continue HD as ordered. tolerating well. vitals stable. access working well   consent in chart. all question answered. daughter agreeable  - Monitor BMP   - Avoid nephrotoxics, NSAIDS RCA    Fluid overload  sec to renal failure  UF with hd as tolerated  f/u cardio    HTN   hypotensive 5/4 nifedipine d/c'd   today bp elevated >150 start losartan 25 daily  monitor BP       anemia  iron sat 15%  will start iron with hd  epo with hd once bp is better  monitor  transfuse to keep hb>7    hyponatremia  likely fluid overload  free water restriction <1L/day    Hyperphosphatemia  continue  binders  low PO4 diet  monitor    hypocalcemia  check pth  monitor    hypokalemia  montior  hd with high k bath    Hepatitis B  known  per daughter did not follow up with hepatology  +hep b PCR  consider hepatology eval

## 2022-05-09 NOTE — PROGRESS NOTE ADULT - SUBJECTIVE AND OBJECTIVE BOX
No new cardiac or pulmonary symptoms    Vital Signs Last 24 Hrs  T(C): 37 (09 May 2022 09:50), Max: 37.2 (08 May 2022 22:30)  T(F): 98.6 (09 May 2022 09:50), Max: 99 (08 May 2022 22:30)  HR: 62 (09 May 2022 09:50) (59 - 72)  BP: 190/88 (09 May 2022 09:50) (139/64 - 190/88)  BP(mean): --  RR: 17 (09 May 2022 09:50) (17 - 18)  SpO2: 98% (09 May 2022 06:30) (97% - 99%)    I&O's Summary    05-08-22 @ 07:01  -  05-09-22 @ 07:00  --------------------------------------------------------  IN: 240 mL / OUT: 0 mL / NET: 240 mL        GENERAL: NAD  HEENT: NCAT, EOMI  NECK: Supple, No JVD  CHEST/LUNG: Decreased BS @ both bases; no wheezes  HEART: Regular rate and rhythm; nl S1/S2; No murmurs, rubs, or gallops  ABDOMEN: Soft, Nontender, Nondistended; Bowel sounds present; No hepatosplenomegaly  EXTREMITIES:  2+ Peripheral Pulses; residual b/l LE edema     LABS:                        8.0    6.23  )-----------( 156      ( 09 May 2022 07:19 )             25.8     05-09    137  |  103  |  46<H>  ----------------------------<  115<H>  4.0   |  22  |  3.15<H>    Ca    8.2<L>      09 May 2022 07:19  Phos  4.3     05-09  Mg     2.10     05-09        CAPILLARY BLOOD GLUCOSE      POCT Blood Glucose.: 151 mg/dL (09 May 2022 08:20)  POCT Blood Glucose.: 173 mg/dL (08 May 2022 22:31)  POCT Blood Glucose.: 191 mg/dL (08 May 2022 18:03)  POCT Blood Glucose.: 192 mg/dL (08 May 2022 12:43)            RADIOLOGY & ADDITIONAL TESTS:    Imaging Personally Reviewed:  [x] YES  [ ] NO    Will obtain old records:  [ ] YES  [x] NO

## 2022-05-09 NOTE — PROGRESS NOTE ADULT - SUBJECTIVE AND OBJECTIVE BOX
Gary Morrison MD  Interventional Cardiology / Endovascular Specialist  [place] Office : [address] [place] [address] [address]  Tel:   [place] Office : [address] [facility] [place] [address]  Tel: [phone]      Subjective/Overnight events: Patient lying in bed comfortably. No acute distress.   	  MEDICATIONS:  aspirin enteric coated 81 milliGRAM(s) Oral daily  carvedilol 6.25 milliGRAM(s) Oral every 12 hours  hydrALAZINE 25 milliGRAM(s) Oral three times a day      loratadine 10 milliGRAM(s) Oral daily    acetaminophen     Tablet .. 650 milliGRAM(s) Oral every 6 hours PRN  gabapentin 300 milliGRAM(s) Oral daily    pantoprazole    Tablet 40 milliGRAM(s) Oral before breakfast  polyethylene glycol 3350 17 Gram(s) Oral daily  senna 2 Tablet(s) Oral at bedtime    atorvastatin 20 milliGRAM(s) Oral at bedtime  dextrose 50% Injectable 25 Gram(s) IV Push once  dextrose 50% Injectable 12.5 Gram(s) IV Push once  dextrose 50% Injectable 25 Gram(s) IV Push once  dextrose Oral Gel 15 Gram(s) Oral once PRN  glucagon  Injectable 1 milliGRAM(s) IntraMuscular once  insulin glargine Injectable (LANTUS) 10 Unit(s) SubCutaneous at bedtime  insulin lispro (ADMELOG) corrective regimen sliding scale   SubCutaneous three times a day before meals  levothyroxine 75 MICROGram(s) Oral daily    calcium acetate 1334 milliGRAM(s) Oral three times a day with meals  chlorhexidine 2% Cloths 1 Application(s) Topical daily  cyanocobalamin 1000 MICROGram(s) Oral daily  dextrose 5%. 1000 milliLiter(s) IV Continuous <Continuous>  dextrose 5%. 1000 milliLiter(s) IV Continuous <Continuous>  epoetin denise-epbx (RETACRIT) Injectable 43121 Unit(s) IV Push <User Schedule>  folic acid 1 milliGRAM(s) Oral daily  sodium chloride 0.9% lock flush 10 milliLiter(s) IV Push every 1 hour PRN      PAST MEDICAL/SURGICAL HISTORY  PAST MEDICAL & SURGICAL HISTORY:  HTN (hypertension)    HLD (hyperlipidemia)        SOCIAL HISTORY: Substance Use (street drugs): ( x ) never used  (  ) other:    FAMILY HISTORY:        PHYSICAL EXAM:  T(C): 37 (05-09-22 @ 09:50), Max: 37.2 (05-08-22 @ 22:30)  HR: 62 (05-09-22 @ 09:50) (59 - 72)  BP: 190/88 (05-09-22 @ 09:50) (139/64 - 190/88)  RR: 17 (05-09-22 @ 09:50) (17 - 18)  SpO2: 98% (05-09-22 @ 06:30) (97% - 99%)  Wt(kg): --  I&O's Summary    08 May 2022 07:01  -  09 May 2022 07:00  --------------------------------------------------------  IN: 240 mL / OUT: 0 mL / NET: 240 mL          GENERAL: NAD  EYES: EOMI, PERRLA, conjunctiva and sclera clear  ENMT: No tonsillar erythema, exudates, or enlargement  Cardiovascular: Normal S1 S2, No JVD, No murmurs, No edema  Respiratory: Lungs clear to auscultation	  Gastrointestinal:  Soft, Non-tender, + BS	  Extremities: No edema                                    8.0    6.23  )-----------( 156      ( 09 May 2022 07:19 )             25.8     05-09    137  |  103  |  46<H>  ----------------------------<  115<H>  4.0   |  22  |  3.15<H>    Ca    8.2<L>      09 May 2022 07:19  Phos  4.3     05-09  Mg     2.10     05-09      proBNP:   Lipid Profile:   HgA1c:   TSH:     Consultant(s) Notes Reviewed:  [x ] YES  [ ] NO    Care Discussed with Consultants/Other Providers [ x] YES  [ ] NO    Imaging Personally Reviewed independently:  [x] YES  [ ] NO    All labs, radiologic studies, vitals, orders and medications list reviewed. Patient is seen and examined at bedside. Case discussed with medical team.

## 2022-05-09 NOTE — PROGRESS NOTE ADULT - SUBJECTIVE AND OBJECTIVE BOX
[facility] [place] NEPHROLOGY PRACTICE   MD ANA KAPADIA MD KRISTINE SOLTANPOUR, DO ANGELA WONG, PA    TEL:  OFFICE: [phone]  From 5pm-7am Answering Service [phone]    -- RENAL FOLLOW UP NOTE ---Date of Service 05-09-22 @ 11:54    Patient is a 66y old  Female who presents with a chief complaint of progressive SOB (09 May 2022 10:44)      Patient seen and examined in HD. No chest pain/sob    VITALS:  T(F): 98.6 (05-09-22 @ 09:50), Max: 99 (05-08-22 @ 22:30)  HR: 62 (05-09-22 @ 09:50)  BP: 190/88 (05-09-22 @ 09:50)  RR: 17 (05-09-22 @ 09:50)  SpO2: 98% (05-09-22 @ 06:30)  Wt(kg): --    05-08 @ 07:01  -  05-09 @ 07:00  --------------------------------------------------------  IN: 240 mL / OUT: 0 mL / NET: 240 mL          PHYSICAL EXAM:  Constitutional: NAD  Neck: No JVD  Respiratory: CTAB, no wheezes, rales or rhonchi  Cardiovascular: S1, S2, RRR  Gastrointestinal: BS+, soft, NT/ND  Extremities: No peripheral edema    Hospital Medications:   MEDICATIONS  (STANDING):  aspirin enteric coated 81 milliGRAM(s) Oral daily  atorvastatin 20 milliGRAM(s) Oral at bedtime  calcium acetate 1334 milliGRAM(s) Oral three times a day with meals  carvedilol 6.25 milliGRAM(s) Oral every 12 hours  chlorhexidine 2% Cloths 1 Application(s) Topical daily  cyanocobalamin 1000 MICROGram(s) Oral daily  dextrose 5%. 1000 milliLiter(s) (50 mL/Hr) IV Continuous <Continuous>  dextrose 5%. 1000 milliLiter(s) (100 mL/Hr) IV Continuous <Continuous>  dextrose 50% Injectable 25 Gram(s) IV Push once  dextrose 50% Injectable 12.5 Gram(s) IV Push once  dextrose 50% Injectable 25 Gram(s) IV Push once  epoetin denise-epbx (RETACRIT) Injectable 43391 Unit(s) IV Push <User Schedule>  folic acid 1 milliGRAM(s) Oral daily  gabapentin 300 milliGRAM(s) Oral daily  glucagon  Injectable 1 milliGRAM(s) IntraMuscular once  hydrALAZINE 25 milliGRAM(s) Oral three times a day  insulin glargine Injectable (LANTUS) 10 Unit(s) SubCutaneous at bedtime  insulin lispro (ADMELOG) corrective regimen sliding scale   SubCutaneous three times a day before meals  levothyroxine 75 MICROGram(s) Oral daily  loratadine 10 milliGRAM(s) Oral daily  losartan 25 milliGRAM(s) Oral daily  pantoprazole    Tablet 40 milliGRAM(s) Oral before breakfast  polyethylene glycol 3350 17 Gram(s) Oral daily  senna 2 Tablet(s) Oral at bedtime      LABS:  05-09    137  |  103  |  46<H>  ----------------------------<  115<H>  4.0   |  22  |  3.15<H>    Ca    8.2<L>      09 May 2022 07:19  Phos  4.3     05-09  Mg     2.10     05-09      Creatinine Trend: 3.15 <--, 2.71 <--, 3.17 <--, 3.02 <--, 3.99 <--, 3.38 <--, 5.00 <--    Phosphorus Level, Serum: 4.3 mg/dL (05-09 @ 07:19)                              8.0    6.23  )-----------( 156      ( 09 May 2022 07:19 )             25.8     Urine Studies:      Iron 37, TIBC 252, %sat 15      [05-07-22 @ 07:00]  Ferritin 83      [05-07-22 @ 07:00]  PTH -- (Ca --)      [05-07-22 @ 06:59]   204  TSH 1.55      [05-04-22 @ 06:42]    HBsAb <3.0      [05-04-22 @ 05:51]  HBsAg Reactive      [05-04-22 @ 05:02]  HBcAb Reactive      [05-04-22 @ 05:51]  HCV 0.13, Nonreact      [05-04-22 @ 05:51]      RADIOLOGY & ADDITIONAL STUDIES:

## 2022-05-10 LAB
ANION GAP SERPL CALC-SCNC: 11 MMOL/L — SIGNIFICANT CHANGE UP (ref 7–14)
APTT BLD: 26.7 SEC — LOW (ref 27–36.3)
BLD GP AB SCN SERPL QL: NEGATIVE — SIGNIFICANT CHANGE UP
BUN SERPL-MCNC: 32 MG/DL — HIGH (ref 7–23)
CALCIUM SERPL-MCNC: 8.1 MG/DL — LOW (ref 8.4–10.5)
CHLORIDE SERPL-SCNC: 96 MMOL/L — LOW (ref 98–107)
CO2 SERPL-SCNC: 26 MMOL/L — SIGNIFICANT CHANGE UP (ref 22–31)
CREAT SERPL-MCNC: 2.73 MG/DL — HIGH (ref 0.5–1.3)
EGFR: 18 ML/MIN/1.73M2 — LOW
GLUCOSE SERPL-MCNC: 142 MG/DL — HIGH (ref 70–99)
HCT VFR BLD CALC: 25.4 % — LOW (ref 34.5–45)
HGB BLD-MCNC: 8.2 G/DL — LOW (ref 11.5–15.5)
INR BLD: 1.1 RATIO — SIGNIFICANT CHANGE UP (ref 0.88–1.16)
MAGNESIUM SERPL-MCNC: 2.1 MG/DL — SIGNIFICANT CHANGE UP (ref 1.6–2.6)
MCHC RBC-ENTMCNC: 28.2 PG — SIGNIFICANT CHANGE UP (ref 27–34)
MCHC RBC-ENTMCNC: 32.3 GM/DL — SIGNIFICANT CHANGE UP (ref 32–36)
MCV RBC AUTO: 87.3 FL — SIGNIFICANT CHANGE UP (ref 80–100)
NRBC # BLD: 0 /100 WBCS — SIGNIFICANT CHANGE UP
NRBC # FLD: 0 K/UL — SIGNIFICANT CHANGE UP
PHOSPHATE SERPL-MCNC: 3.6 MG/DL — SIGNIFICANT CHANGE UP (ref 2.5–4.5)
PLATELET # BLD AUTO: 160 K/UL — SIGNIFICANT CHANGE UP (ref 150–400)
POTASSIUM SERPL-MCNC: 3.6 MMOL/L — SIGNIFICANT CHANGE UP (ref 3.5–5.3)
POTASSIUM SERPL-SCNC: 3.6 MMOL/L — SIGNIFICANT CHANGE UP (ref 3.5–5.3)
PROTHROM AB SERPL-ACNC: 12.8 SEC — SIGNIFICANT CHANGE UP (ref 10.5–13.4)
RBC # BLD: 2.91 M/UL — LOW (ref 3.8–5.2)
RBC # FLD: 13.4 % — SIGNIFICANT CHANGE UP (ref 10.3–14.5)
RH IG SCN BLD-IMP: POSITIVE — SIGNIFICANT CHANGE UP
SARS-COV-2 RNA SPEC QL NAA+PROBE: SIGNIFICANT CHANGE UP
SODIUM SERPL-SCNC: 133 MMOL/L — LOW (ref 135–145)
WBC # BLD: 7.05 K/UL — SIGNIFICANT CHANGE UP (ref 3.8–10.5)
WBC # FLD AUTO: 7.05 K/UL — SIGNIFICANT CHANGE UP (ref 3.8–10.5)

## 2022-05-10 PROCEDURE — 36821 AV FUSION DIRECT ANY SITE: CPT | Mod: LT

## 2022-05-10 DEVICE — LIGATING CLIPS WECK HORIZON SMALL-WIDE (RED) 24: Type: IMPLANTABLE DEVICE | Site: LEFT | Status: FUNCTIONAL

## 2022-05-10 DEVICE — LIGATING CLIPS WECK HORIZON MEDIUM (BLUE) 24: Type: IMPLANTABLE DEVICE | Site: LEFT | Status: FUNCTIONAL

## 2022-05-10 RX ORDER — INSULIN LISPRO 100/ML
VIAL (ML) SUBCUTANEOUS AT BEDTIME
Refills: 0 | Status: DISCONTINUED | OUTPATIENT
Start: 2022-05-10 | End: 2022-05-14

## 2022-05-10 RX ORDER — OXYCODONE HYDROCHLORIDE 5 MG/1
5 TABLET ORAL ONCE
Refills: 0 | Status: DISCONTINUED | OUTPATIENT
Start: 2022-05-10 | End: 2022-05-10

## 2022-05-10 RX ORDER — HYDROMORPHONE HYDROCHLORIDE 2 MG/ML
0.5 INJECTION INTRAMUSCULAR; INTRAVENOUS; SUBCUTANEOUS
Refills: 0 | Status: DISCONTINUED | OUTPATIENT
Start: 2022-05-10 | End: 2022-05-10

## 2022-05-10 RX ORDER — SODIUM CHLORIDE 9 MG/ML
1000 INJECTION, SOLUTION INTRAVENOUS
Refills: 0 | Status: DISCONTINUED | OUTPATIENT
Start: 2022-05-10 | End: 2022-05-10

## 2022-05-10 RX ADMIN — Medication 81 MILLIGRAM(S): at 18:34

## 2022-05-10 RX ADMIN — PREGABALIN 1000 MICROGRAM(S): 225 CAPSULE ORAL at 18:34

## 2022-05-10 RX ADMIN — CARVEDILOL PHOSPHATE 6.25 MILLIGRAM(S): 80 CAPSULE, EXTENDED RELEASE ORAL at 18:34

## 2022-05-10 RX ADMIN — PANTOPRAZOLE SODIUM 40 MILLIGRAM(S): 20 TABLET, DELAYED RELEASE ORAL at 06:11

## 2022-05-10 RX ADMIN — Medication 25 MILLIGRAM(S): at 06:10

## 2022-05-10 RX ADMIN — CHLORHEXIDINE GLUCONATE 1 APPLICATION(S): 213 SOLUTION TOPICAL at 18:36

## 2022-05-10 RX ADMIN — GABAPENTIN 300 MILLIGRAM(S): 400 CAPSULE ORAL at 18:34

## 2022-05-10 RX ADMIN — INSULIN GLARGINE 10 UNIT(S): 100 INJECTION, SOLUTION SUBCUTANEOUS at 22:38

## 2022-05-10 RX ADMIN — LOSARTAN POTASSIUM 25 MILLIGRAM(S): 100 TABLET, FILM COATED ORAL at 06:10

## 2022-05-10 RX ADMIN — CARVEDILOL PHOSPHATE 6.25 MILLIGRAM(S): 80 CAPSULE, EXTENDED RELEASE ORAL at 06:11

## 2022-05-10 RX ADMIN — POLYETHYLENE GLYCOL 3350 17 GRAM(S): 17 POWDER, FOR SOLUTION ORAL at 18:33

## 2022-05-10 RX ADMIN — Medication 1 MILLIGRAM(S): at 18:33

## 2022-05-10 RX ADMIN — Medication 2: at 23:01

## 2022-05-10 RX ADMIN — Medication 1334 MILLIGRAM(S): at 18:33

## 2022-05-10 RX ADMIN — Medication 1: at 09:23

## 2022-05-10 RX ADMIN — Medication 25 MILLIGRAM(S): at 21:59

## 2022-05-10 RX ADMIN — Medication 75 MICROGRAM(S): at 06:11

## 2022-05-10 RX ADMIN — LORATADINE 10 MILLIGRAM(S): 10 TABLET ORAL at 18:35

## 2022-05-10 RX ADMIN — ATORVASTATIN CALCIUM 20 MILLIGRAM(S): 80 TABLET, FILM COATED ORAL at 21:59

## 2022-05-10 RX ADMIN — SENNA PLUS 2 TABLET(S): 8.6 TABLET ORAL at 21:59

## 2022-05-10 NOTE — PROGRESS NOTE ADULT - SUBJECTIVE AND OBJECTIVE BOX
[facility]   MD ANA KAPADIA MD KRISTINE SOLTANPOUR, DO ANGELA WONG, PA    TEL:  OFFICE: [phone]  From 5pm-7am Answering Service [phone]    -- RENAL FOLLOW UP NOTE ---Date of Service 05-10-22 @ 22:15    Patient is a 66y old  Female who presents with a chief complaint of progressive SOB (10 May 2022 09:27)      Patient seen and examined at bedside. No chest pain/sob. Had creation of left AVF today.     VITALS:  T(F): 98.4 (05-10-22 @ 21:59), Max: 98.4 (05-10-22 @ 21:59)  HR: 66 (05-10-22 @ 21:59)  BP: 131/52 (05-10-22 @ 21:59)  RR: 18 (05-10-22 @ 21:59)  SpO2: 100% (05-10-22 @ 21:59)  Wt(kg): --    05-09 @ 07:01  -  05-10 @ 07:00  --------------------------------------------------------  IN: 400 mL / OUT: 2400 mL / NET: -2000 mL    05-10 @ 07:01  -  05-10 @ 22:15  --------------------------------------------------------  IN: 100 mL / OUT: 0 mL / NET: 100 mL      Height (cm): 154.9 (05-10 @ 10:14)  Weight (kg): 57.8 (05-10 @ 10:14)  BMI (kg/m2): 24.1 (05-10 @ 10:14)  BSA (m2): 1.56 (05-10 @ 10:14)    PHYSICAL EXAM:  Constitutional: NAD  Neck: No JVD  Respiratory: CTAB, no wheezes, rales or rhonchi  Cardiovascular: S1, S2, RRR  Gastrointestinal: BS+, soft, NT/ND  Extremities: No peripheral edema    Hospital Medications:   MEDICATIONS  (STANDING):  aspirin enteric coated 81 milliGRAM(s) Oral daily  atorvastatin 20 milliGRAM(s) Oral at bedtime  calcium acetate 1334 milliGRAM(s) Oral three times a day with meals  carvedilol 6.25 milliGRAM(s) Oral every 12 hours  chlorhexidine 2% Cloths 1 Application(s) Topical daily  cyanocobalamin 1000 MICROGram(s) Oral daily  dextrose 5%. 1000 milliLiter(s) (100 mL/Hr) IV Continuous <Continuous>  dextrose 5%. 1000 milliLiter(s) (50 mL/Hr) IV Continuous <Continuous>  dextrose 50% Injectable 25 Gram(s) IV Push once  dextrose 50% Injectable 12.5 Gram(s) IV Push once  dextrose 50% Injectable 25 Gram(s) IV Push once  epoetin denise-epbx (RETACRIT) Injectable 37549 Unit(s) IV Push <User Schedule>  folic acid 1 milliGRAM(s) Oral daily  gabapentin 300 milliGRAM(s) Oral daily  glucagon  Injectable 1 milliGRAM(s) IntraMuscular once  hydrALAZINE 25 milliGRAM(s) Oral three times a day  insulin glargine Injectable (LANTUS) 10 Unit(s) SubCutaneous at bedtime  insulin lispro (ADMELOG) corrective regimen sliding scale   SubCutaneous three times a day before meals  iron sucrose Injectable 100 milliGRAM(s) IV Push <User Schedule>  levothyroxine 75 MICROGram(s) Oral daily  loratadine 10 milliGRAM(s) Oral daily  losartan 25 milliGRAM(s) Oral daily  pantoprazole    Tablet 40 milliGRAM(s) Oral before breakfast  polyethylene glycol 3350 17 Gram(s) Oral daily  senna 2 Tablet(s) Oral at bedtime      LABS:  05-10    133<L>  |  96<L>  |  32<H>  ----------------------------<  142<H>  3.6   |  26  |  2.73<H>    Ca    8.1<L>      10 May 2022 04:16  Phos  3.6     05-10  Mg     2.10     05-10      Creatinine Trend: 2.73 <--, 3.15 <--, 2.71 <--, 3.17 <--, 3.02 <--, 3.99 <--, 3.38 <--    Phosphorus Level, Serum: 3.6 mg/dL (05-10 @ 04:16)                              8.2    7.05  )-----------( 160      ( 10 May 2022 04:16 )             25.4     Urine Studies:      Iron 37, TIBC 252, %sat 15      [05-07-22 @ 07:00]  Ferritin 83      [05-07-22 @ 07:00]  PTH -- (Ca --)      [05-07-22 @ 06:59]   204  TSH 1.55      [05-04-22 @ 06:42]    HBsAb <3.0      [05-04-22 @ 05:51]  HBsAg Reactive      [05-04-22 @ 05:02]  HBcAb Reactive      [05-04-22 @ 05:51]  HCV 0.13, Nonreact      [05-04-22 @ 05:51]      RADIOLOGY & ADDITIONAL STUDIES:

## 2022-05-10 NOTE — PROGRESS NOTE ADULT - SUBJECTIVE AND OBJECTIVE BOX
No shortness of breath  No acute chest tightness    Vital Signs Last 24 Hrs  T(C): 36.7 (10 May 2022 06:00), Max: 37 (09 May 2022 09:50)  T(F): 98 (10 May 2022 06:00), Max: 98.6 (09 May 2022 09:50)  HR: 61 (10 May 2022 06:00) (61 - 74)  BP: 135/61 (10 May 2022 06:00) (117/61 - 190/88)  BP(mean): --  RR: 18 (10 May 2022 06:00) (17 - 18)  SpO2: 100% (10 May 2022 06:00) (98% - 100%)    I&O's Summary    05-09-22 @ 07:01  -  05-10-22 @ 07:00  --------------------------------------------------------  IN: 400 mL / OUT: 2400 mL / NET: -2000 mL        GENERAL: NAD  HEENT: NCAT, EOMI  NECK: Supple, No JVD  CHEST/LUNG: Decreased BS @ both bases; no wheezes  HEART: Regular rate and rhythm; nl S1/S2; No murmurs, rubs, or gallops  ABDOMEN: Soft, Nontender, Nondistended; Bowel sounds present; No hepatosplenomegaly  EXTREMITIES:  2+ Peripheral Pulses; residual b/l LE edema       LABS:                        8.2    7.05  )-----------( 160      ( 10 May 2022 04:16 )             25.4     05-10    133<L>  |  96<L>  |  32<H>  ----------------------------<  142<H>  3.6   |  26  |  2.73<H>    Ca    8.1<L>      10 May 2022 04:16  Phos  3.6     05-10  Mg     2.10     05-10      PT/INR - ( 10 May 2022 04:16 )   PT: 12.8 sec;   INR: 1.10 ratio         PTT - ( 10 May 2022 04:16 )  PTT:26.7 sec  CAPILLARY BLOOD GLUCOSE      POCT Blood Glucose.: 170 mg/dL (10 May 2022 09:03)  POCT Blood Glucose.: 163 mg/dL (09 May 2022 21:57)  POCT Blood Glucose.: 275 mg/dL (09 May 2022 18:05)  POCT Blood Glucose.: 113 mg/dL (09 May 2022 12:24)            RADIOLOGY & ADDITIONAL TESTS:    Imaging Personally Reviewed:  [x] YES  [ ] NO    Will obtain old records:  [ ] YES  [x] NO

## 2022-05-10 NOTE — PROGRESS NOTE ADULT - ASSESSMENT
67yo F with Hx HTN, HLD, DM, hypothyroidism, CKD who presented with SOB and worsening renal failure requiring initially of HD. OR for LUE AVF today.     PLAN:  - OR today for LUE AVF  - NPO for OR  - IM clearance noted, appreciate evaluation  - Cardiac clearance noted, appreciate evaluation      Eliana Cuevas, PGY-2  C Team Surgery  #[id number]

## 2022-05-10 NOTE — PROGRESS NOTE ADULT - SUBJECTIVE AND OBJECTIVE BOX
Subjective:  Patient seen at bedside this AM. Reports feeling well, without complaints. Denies chest pain, SOB.      24h Events:   - Overnight, no acute events    Objective:  Vital Signs  T(C): 36.7 (05-10 @ 06:00), Max: 37 (05-09 @ 09:50)  HR: 61 (05-10 @ 06:00) (61 - 74)  BP: 135/61 (05-10 @ 06:00) (117/61 - 190/88)  RR: 18 (05-10 @ 06:00) (17 - 18)  SpO2: 100% (05-10 @ 06:00) (98% - 100%)      Physical Exam:  GEN: resting in bed comfortably in NAD  NEURO: awake, alert  RESP: no increased WOB  EXTR: LUE warm, well-perfused, palpable radial + ulnar pulses, sensory + motor intact    Labs:             8.2    7.05  )-----------( 160      ( 10 May 2022 04:16 )             25.4     133<L>  |  96<L>  |  32<H>  ----------------------------<  142<H>  3.6   |  26  |  2.73<H>    Ca    8.1<L>      10 May 2022 04:16  Phos  3.6     05-10  Mg     2.10     05-10    POCT Blood Glucose.: 163 mg/dL (09 May 2022 21:57)  POCT Blood Glucose.: 275 mg/dL (09 May 2022 18:05)  POCT Blood Glucose.: 113 mg/dL (09 May 2022 12:24)  POCT Blood Glucose.: 151 mg/dL (09 May 2022 08:20)      Medications:   MEDICATIONS  (STANDING):  aspirin enteric coated 81 milliGRAM(s) Oral daily  atorvastatin 20 milliGRAM(s) Oral at bedtime  calcium acetate 1334 milliGRAM(s) Oral three times a day with meals  carvedilol 6.25 milliGRAM(s) Oral every 12 hours  chlorhexidine 2% Cloths 1 Application(s) Topical daily  cyanocobalamin 1000 MICROGram(s) Oral daily  dextrose 5%. 1000 milliLiter(s) (50 mL/Hr) IV Continuous <Continuous>  dextrose 5%. 1000 milliLiter(s) (100 mL/Hr) IV Continuous <Continuous>  dextrose 50% Injectable 25 Gram(s) IV Push once  dextrose 50% Injectable 12.5 Gram(s) IV Push once  dextrose 50% Injectable 25 Gram(s) IV Push once  epoetin denise-epbx (RETACRIT) Injectable 47936 Unit(s) IV Push <User Schedule>  folic acid 1 milliGRAM(s) Oral daily  gabapentin 300 milliGRAM(s) Oral daily  glucagon  Injectable 1 milliGRAM(s) IntraMuscular once  hydrALAZINE 25 milliGRAM(s) Oral three times a day  insulin glargine Injectable (LANTUS) 10 Unit(s) SubCutaneous at bedtime  insulin lispro (ADMELOG) corrective regimen sliding scale   SubCutaneous three times a day before meals  iron sucrose Injectable 100 milliGRAM(s) IV Push <User Schedule>  levothyroxine 75 MICROGram(s) Oral daily  loratadine 10 milliGRAM(s) Oral daily  losartan 25 milliGRAM(s) Oral daily  pantoprazole    Tablet 40 milliGRAM(s) Oral before breakfast  polyethylene glycol 3350 17 Gram(s) Oral daily  senna 2 Tablet(s) Oral at bedtime    MEDICATIONS  (PRN):  acetaminophen     Tablet .. 650 milliGRAM(s) Oral every 6 hours PRN Mild Pain (1 - 3), Moderate Pain (4 - 6), Severe Pain (7 - 10)  dextrose Oral Gel 15 Gram(s) Oral once PRN Blood Glucose LESS THAN 70 milliGRAM(s)/deciliter  sodium chloride 0.9% lock flush 10 milliLiter(s) IV Push every 1 hour PRN Pre/post blood products, medications, blood draw, and to maintain line patency

## 2022-05-10 NOTE — PROGRESS NOTE ADULT - ASSESSMENT
65 yo female with ho HTN, DM, HLD, CKD, hypothyroidism p/e progressive SOB over last 3 days with LE edema, abd bloating and periorbital edema c/w ESRD with fluid overload.  Pt to start HD today    Problem/Plan - 1:  ·  Problem: ESRD on hemodialysis.   ·  Plan: pt with advanced CKD now req HD, to start today  IR placed non tunneled catheter 5/3/22 for HD   vasc consulted for AVF tentatively 5/10/22  HD per nephrology  pt rec'd lasix 40 mg in ED  cont Ca acetate, oral bicarb for now  Cardiac clearance appreciated  Pt is medically optimized for AVF and may proceed with procedure on 5/10/22  Will need IR-guided conversion to tunneled catheter this week    Problem/Plan - 2:  ·  Problem: HTN (hypertension).   ·  Plan: cont coreg, nifedipine, hydralazine with hold parameters  cont ASA  elevaated trop with no delta, likely due to renal dz, pt without CP.    Problem/Plan - 3:  ·  Problem: Diabetes mellitus, type 2.   ·  Plan: pt on lantus, 70/30, trulicity at home  will cont lantus here with sliding scale for now  adjust as needed  a1c = 5.6    Problem/Plan - 4:  ·  Problem: Hypothyroidism.   ·  Plan: cont synthroid  TSH = 1.55.    Problem/Plan - 5:  ·  Problem: Anemia of chronic disease.   ·  Plan: likely multifactorial  B12 def, ESRD.

## 2022-05-10 NOTE — PROGRESS NOTE ADULT - ASSESSMENT
66F PMH CKD 5 not on hd, HTN, HLD, DM2, hypothyroid recent hospital admission Mar 2022 with plans for starting outpt dialysis presenting with fluid overload, worsen renal function, need to initiate dialysis,    CKD stage 5 now on HD ---> ESRD on HD 5/3/2022  now with worsen renal function, mild uremic symptom and fluid overload  initiated HD 5/3   s/p carrie 5/3 with IR  will need permacath for dc planning IR consulted  AVF left upper arm created on 5/10/2022  HD tomorrow 5/11  s/p HD on 5/9  consent in chart.   - Monitor BMP   - Avoid nephrotoxics, NSAIDS RCA    Fluid overload  sec to renal failure  UF with HD as tolerated  f/u cardio    HTN   Optimal   on losartan 25 daily and Carvedilol 6.25 mg po every 12 hours.   monitor BP       anemia  iron sat 15%  will start iron with hd  epo with HD once bp is better  monitor  transfuse to keep hb>7    hyponatremia  likely fluid overload  free water restriction <1L/day    Hyperphosphatemia  continue  binders  low PO4 diet  monitor    hypocalcemia  check PTH  monitor    hypokalemia  Monitor  hd with high k bath    Hepatitis B  known  per daughter did not follow up with hepatology  +hep b PCR  consider hepatology eval

## 2022-05-11 ENCOUNTER — APPOINTMENT (OUTPATIENT)
Dept: VASCULAR SURGERY | Facility: CLINIC | Age: 67
End: 2022-05-11

## 2022-05-11 ENCOUNTER — TRANSCRIPTION ENCOUNTER (OUTPATIENT)
Age: 67
End: 2022-05-11

## 2022-05-11 LAB
ANION GAP SERPL CALC-SCNC: 11 MMOL/L — SIGNIFICANT CHANGE UP (ref 7–14)
BUN SERPL-MCNC: 45 MG/DL — HIGH (ref 7–23)
CALCIUM SERPL-MCNC: 8.5 MG/DL — SIGNIFICANT CHANGE UP (ref 8.4–10.5)
CHLORIDE SERPL-SCNC: 100 MMOL/L — SIGNIFICANT CHANGE UP (ref 98–107)
CO2 SERPL-SCNC: 23 MMOL/L — SIGNIFICANT CHANGE UP (ref 22–31)
CREAT SERPL-MCNC: 3.71 MG/DL — HIGH (ref 0.5–1.3)
EGFR: 13 ML/MIN/1.73M2 — LOW
GLUCOSE SERPL-MCNC: 155 MG/DL — HIGH (ref 70–99)
HCT VFR BLD CALC: 29.1 % — LOW (ref 34.5–45)
HGB BLD-MCNC: 9.2 G/DL — LOW (ref 11.5–15.5)
MAGNESIUM SERPL-MCNC: 2.4 MG/DL — SIGNIFICANT CHANGE UP (ref 1.6–2.6)
MCHC RBC-ENTMCNC: 28 PG — SIGNIFICANT CHANGE UP (ref 27–34)
MCHC RBC-ENTMCNC: 31.6 GM/DL — LOW (ref 32–36)
MCV RBC AUTO: 88.7 FL — SIGNIFICANT CHANGE UP (ref 80–100)
NRBC # BLD: 0 /100 WBCS — SIGNIFICANT CHANGE UP
NRBC # FLD: 0 K/UL — SIGNIFICANT CHANGE UP
PHOSPHATE SERPL-MCNC: 4.7 MG/DL — HIGH (ref 2.5–4.5)
PLATELET # BLD AUTO: 219 K/UL — SIGNIFICANT CHANGE UP (ref 150–400)
POTASSIUM SERPL-MCNC: 4.2 MMOL/L — SIGNIFICANT CHANGE UP (ref 3.5–5.3)
POTASSIUM SERPL-SCNC: 4.2 MMOL/L — SIGNIFICANT CHANGE UP (ref 3.5–5.3)
RBC # BLD: 3.28 M/UL — LOW (ref 3.8–5.2)
RBC # FLD: 13.5 % — SIGNIFICANT CHANGE UP (ref 10.3–14.5)
SODIUM SERPL-SCNC: 134 MMOL/L — LOW (ref 135–145)
WBC # BLD: 9.12 K/UL — SIGNIFICANT CHANGE UP (ref 3.8–10.5)
WBC # FLD AUTO: 9.12 K/UL — SIGNIFICANT CHANGE UP (ref 3.8–10.5)

## 2022-05-11 PROCEDURE — 99222 1ST HOSP IP/OBS MODERATE 55: CPT | Mod: GC

## 2022-05-11 RX ADMIN — CARVEDILOL PHOSPHATE 6.25 MILLIGRAM(S): 80 CAPSULE, EXTENDED RELEASE ORAL at 17:09

## 2022-05-11 RX ADMIN — Medication 81 MILLIGRAM(S): at 12:52

## 2022-05-11 RX ADMIN — POLYETHYLENE GLYCOL 3350 17 GRAM(S): 17 POWDER, FOR SOLUTION ORAL at 12:52

## 2022-05-11 RX ADMIN — INSULIN GLARGINE 10 UNIT(S): 100 INJECTION, SOLUTION SUBCUTANEOUS at 22:30

## 2022-05-11 RX ADMIN — ATORVASTATIN CALCIUM 20 MILLIGRAM(S): 80 TABLET, FILM COATED ORAL at 21:14

## 2022-05-11 RX ADMIN — Medication 75 MICROGRAM(S): at 06:14

## 2022-05-11 RX ADMIN — ERYTHROPOIETIN 10000 UNIT(S): 10000 INJECTION, SOLUTION INTRAVENOUS; SUBCUTANEOUS at 12:21

## 2022-05-11 RX ADMIN — Medication 1 MILLIGRAM(S): at 12:52

## 2022-05-11 RX ADMIN — Medication 1: at 09:16

## 2022-05-11 RX ADMIN — PANTOPRAZOLE SODIUM 40 MILLIGRAM(S): 20 TABLET, DELAYED RELEASE ORAL at 06:14

## 2022-05-11 RX ADMIN — Medication 1334 MILLIGRAM(S): at 17:09

## 2022-05-11 RX ADMIN — GABAPENTIN 300 MILLIGRAM(S): 400 CAPSULE ORAL at 12:53

## 2022-05-11 RX ADMIN — CHLORHEXIDINE GLUCONATE 1 APPLICATION(S): 213 SOLUTION TOPICAL at 12:54

## 2022-05-11 RX ADMIN — IRON SUCROSE 100 MILLIGRAM(S): 20 INJECTION, SOLUTION INTRAVENOUS at 12:18

## 2022-05-11 RX ADMIN — SENNA PLUS 2 TABLET(S): 8.6 TABLET ORAL at 21:14

## 2022-05-11 RX ADMIN — PREGABALIN 1000 MICROGRAM(S): 225 CAPSULE ORAL at 12:52

## 2022-05-11 RX ADMIN — Medication 1: at 18:28

## 2022-05-11 RX ADMIN — Medication 1334 MILLIGRAM(S): at 12:52

## 2022-05-11 RX ADMIN — LORATADINE 10 MILLIGRAM(S): 10 TABLET ORAL at 12:54

## 2022-05-11 NOTE — CONSULT NOTE ADULT - SUBJECTIVE AND OBJECTIVE BOX
Interventional Radiology Inpatient Consult Note       HPI:  Pt is a 65 yo female with ho DM, HTN, HLD, CKD, approaching HD, hypothyroidism.  Pt recently admitted for similar scenario which improved with diuretics, however, plans for HD as outpt at that time.  Pt p/w inc SOB for the last 3 days with LE swelling, L shoulder pain, abdominal bloating and periorbital swelling.  Denies n/v/d.  No sick contacts at home; Per daughter, pt is fully vaccinated for COVID.  Now plans to start HD today per nephrology due to progressive sx (03 May 2022 14:47)      Vital Signs: Vital Signs Last 24 Hrs  T(C): 37.2 (11 May 2022 03:50), Max: 37.2 (11 May 2022 03:50)  T(F): 98.9 (11 May 2022 03:50), Max: 98.9 (11 May 2022 03:50)  HR: 70 (11 May 2022 03:50) (54 - 70)  BP: 133/60 (11 May 2022 03:50) (131/52 - 183/66)  BP(mean): 88 (10 May 2022 17:00) (83 - 124)  RR: 18 (11 May 2022 03:50) (13 - 18)  SpO2: 99% (11 May 2022 03:50) (95% - 100%)    Past Medical/ Surgical History: PAST MEDICAL & SURGICAL HISTORY:  HTN (hypertension)      HLD (hyperlipidemia)          Allergies: Allergies    No Known Allergies    Intolerances        Medications: MEDICATIONS  (STANDING):  aspirin enteric coated 81 milliGRAM(s) Oral daily  atorvastatin 20 milliGRAM(s) Oral at bedtime  calcium acetate 1334 milliGRAM(s) Oral three times a day with meals  carvedilol 6.25 milliGRAM(s) Oral every 12 hours  chlorhexidine 2% Cloths 1 Application(s) Topical daily  cyanocobalamin 1000 MICROGram(s) Oral daily  dextrose 5%. 1000 milliLiter(s) (100 mL/Hr) IV Continuous <Continuous>  dextrose 5%. 1000 milliLiter(s) (50 mL/Hr) IV Continuous <Continuous>  dextrose 50% Injectable 25 Gram(s) IV Push once  dextrose 50% Injectable 12.5 Gram(s) IV Push once  dextrose 50% Injectable 25 Gram(s) IV Push once  epoetin denise-epbx (RETACRIT) Injectable 63844 Unit(s) IV Push <User Schedule>  folic acid 1 milliGRAM(s) Oral daily  gabapentin 300 milliGRAM(s) Oral daily  glucagon  Injectable 1 milliGRAM(s) IntraMuscular once  hydrALAZINE 25 milliGRAM(s) Oral three times a day  insulin glargine Injectable (LANTUS) 10 Unit(s) SubCutaneous at bedtime  insulin lispro (ADMELOG) corrective regimen sliding scale   SubCutaneous three times a day before meals  insulin lispro (ADMELOG) corrective regimen sliding scale   SubCutaneous at bedtime  iron sucrose Injectable 100 milliGRAM(s) IV Push <User Schedule>  levothyroxine 75 MICROGram(s) Oral daily  loratadine 10 milliGRAM(s) Oral daily  losartan 25 milliGRAM(s) Oral daily  pantoprazole    Tablet 40 milliGRAM(s) Oral before breakfast  polyethylene glycol 3350 17 Gram(s) Oral daily  senna 2 Tablet(s) Oral at bedtime    MEDICATIONS  (PRN):  acetaminophen     Tablet .. 650 milliGRAM(s) Oral every 6 hours PRN Mild Pain (1 - 3), Moderate Pain (4 - 6), Severe Pain (7 - 10)  dextrose Oral Gel 15 Gram(s) Oral once PRN Blood Glucose LESS THAN 70 milliGRAM(s)/deciliter  sodium chloride 0.9% lock flush 10 milliLiter(s) IV Push every 1 hour PRN Pre/post blood products, medications, blood draw, and to maintain line patency      SOCIAL HISTORY:    FAMILY HISTORY:        PHYSICAL EXAM:    General:   Well-groomed, well-nourished, in no distress  Lungs:  CTA bilaterally  Cardiovascular:   S1, S2,   Abdomen:  Soft, non-tender, non-distended,   Extremities:  no calf tenderness/swelling bilaterally  Musculoskeletal:  Full ROM in all joints w/o swelling/tenderness/effusion  Neuro/Psych:  A &O x 3    LABS:                        9.2    9.12  )-----------( 219      ( 11 May 2022 06:51 )             29.1     05-11    134<L>  |  100  |  45<H>  ----------------------------<  155<H>  4.2   |  23  |  3.71<H>    Ca    8.5      11 May 2022 06:51  Phos  4.7     05-11  Mg     2.40     05-11      PT/INR - ( 10 May 2022 04:16 )   PT: 12.8 sec;   INR: 1.10 ratio         PTT - ( 10 May 2022 04:16 )  PTT:26.7 sec      RADIOLOGY & ADDITIONAL STUDIES:  < from: IR Procedure (05.03.22 @ 15:50) >    ACC: [id number] EXAM:  IR PROCEDURE                          PROCEDURE DATE:  05/03/2022          INTERPRETATION:  Ultrasound and fluoroscopy guided placement of   nontunneled dialysis catheter    Clinical indication: 66-year-old female with history of ESRD presents for   nontunneled dialysis catheter placement    PROCEDURE:  After discussing the risks, benefits, and alternatives of this procedure   with the patient, informed consent was obtained. The patient was placed   supine on the fluoroscopic examination table and connected to physiologic   monitoring. The right side of the neck was prepped and draped in the   usual sterile fashion.    Ultrasound of the right side of the neck demonstrated patent internal   jugular vein. Ultrasound images were saved in PACS.    Lidocaine 1% was used for local anesthesia.    Using sonographic guidance, the right internal jugular vein was accessed.   Using flouroscopic guidance, mandril wire was advanced to the superior   vena cava, followed by placement of the 4 Bangladeshi introducer catheter. A   Mandril wire was then exchanged for an Amplatz wire, which was advanced   down into the inferior vena cava. The introducer catheter was then   removed over the Amplatz wire and the tract was serially dilatedto 14   Bangladeshi. A 14 Bangladeshi x 15 cm Schon XL temporary dialysis catheter was then   advanced down the right internal jugular vein over the Amplatz wire and   positioned with its tip at the cavoatrial junction. Both lumens of the   catheter aspirated and flushed freely and were then each locked with   heparinized saline. The catheter was secured to the patient's skin with   Prolene sutures and a dry sterile dressing was then applied. The patient   tolerated the procedure well and was discharged from the radiology   department in stable condition and without complaints. There were no   immediate complications.    IMPRESSION:  Successful placement of temporary dialysis catheter via right internal   jugular vein. Tip of catheter near cavoatrial junction.    --- End of Report ---          CASSIDY GANNON [facility] PA  This document has been electronically signed.  WAYNE EDDY MD; Attending Radiologist  This document has been electronically signed. May  4 2022 10:18AM    < end of copied text >      A/P: Pt is a 65 yo female with ho DM, HTN, HLD, CKD, approaching HD, hypothyroidism. Pt recently admitted for similar scenario which improved with diuretics, however, plans for HD as output at that time. Non-tunnelled HD access by IR on 5/4. C/S for conversion to tunnelled line.    IR initially requested vascular surgery place line in OR during AVF creation yesterday to avoid multiple rounds of anesthesia/sedation and minimize pt's risk, however it was not preformed concurrently. Vascular surgery reporting no fluoroscopic room was available.     Pt will therefore be approved by IR for tunelled catheter placement (conversion from non-tunnelled).     PLAN:  - Please place order for IR Procedure "Non-tunnelled to Tunnelled Hemodialysis catheter conversion", approving attending Dr. Eddy  - c/w NPO; add on later today  - [id number]; otherwise hold therapeutic and prophylactic anticoagulants  - maintain active type and screen x 2  - Complete IR pre-procedure note  - no additional labs requested

## 2022-05-11 NOTE — DIETITIAN INITIAL EVALUATION ADULT - OTHER INFO
RD visited with patient for length of stay.  Patient started on HD on this admission.  PO intake has been fair to good.  No chewing or swallowing difficulties reported. No GI distress reported i.e. nausea, vomiting, diarrhea. No food allergies noted or reported.  Patient deferred to family to receive diet education materials - left with patient at bedsde. RD visited with patient for length of stay.  Patient started on HD on this admission.  PO intake has been fair to good.  No chewing or swallowing difficulties reported. No GI distress reported i.e. nausea, vomiting, diarrhea. No food allergies noted or reported.  Patient deferred to family to receive diet education materials - left with patient at bedside.  RD contacted daughter to provide diet education on renal diet parameters; daughter appreciative of information provided. Family providing meals daily when visiting as well.

## 2022-05-11 NOTE — PROGRESS NOTE ADULT - SUBJECTIVE AND OBJECTIVE BOX
[facility] [place] [facility]ROLOGY PRACTICE   MD ANA KAPADIA MD KRISTINE SOLTANPOUR, ADELE GUILLEN    TEL:  OFFICE: [phone]  From 5pm-7am Answering Service [phone]    -- RENAL FOLLOW UP NOTE ---Date of Service 05-11-22 @ 15:09    Patient is a 66y old  Female who presents with a chief complaint of Shortness of breath     (11 May 2022 13:43)      Patient seen and examined at bedside. No chest pain/sob    VITALS:  T(F): 97.8 (05-11-22 @ 14:40), Max: 98.9 (05-11-22 @ 03:50)  HR: 71 (05-11-22 @ 14:40)  BP: 149/56 (05-11-22 @ 14:40)  RR: 17 (05-11-22 @ 14:40)  SpO2: 99% (05-11-22 @ 09:00)  Wt(kg): --    05-10 @ 07:01  -  05-11 @ 07:00  --------------------------------------------------------  IN: 100 mL / OUT: 0 mL / NET: 100 mL    05-11 @ 07:01  -  05-11 @ 15:09  --------------------------------------------------------  IN: 400 mL / OUT: 1900 mL / NET: -1500 mL          PHYSICAL EXAM:  Constitutional: NAD  Neck: No JVD  Respiratory: CTAB, no wheezes, rales or rhonchi  Cardiovascular: S1, S2, RRR  Gastrointestinal: BS+, soft, NT/ND  Extremities: No peripheral edema    Hospital Medications:   MEDICATIONS  (STANDING):  aspirin enteric coated 81 milliGRAM(s) Oral daily  atorvastatin 20 milliGRAM(s) Oral at bedtime  calcium acetate 1334 milliGRAM(s) Oral three times a day with meals  carvedilol 6.25 milliGRAM(s) Oral every 12 hours  chlorhexidine 2% Cloths 1 Application(s) Topical daily  cyanocobalamin 1000 MICROGram(s) Oral daily  dextrose 5%. 1000 milliLiter(s) (100 mL/Hr) IV Continuous <Continuous>  dextrose 5%. 1000 milliLiter(s) (50 mL/Hr) IV Continuous <Continuous>  dextrose 50% Injectable 25 Gram(s) IV Push once  dextrose 50% Injectable 12.5 Gram(s) IV Push once  dextrose 50% Injectable 25 Gram(s) IV Push once  epoetin denise-epbx (RETACRIT) Injectable 72826 Unit(s) IV Push <User Schedule>  folic acid 1 milliGRAM(s) Oral daily  gabapentin 300 milliGRAM(s) Oral daily  glucagon  Injectable 1 milliGRAM(s) IntraMuscular once  hydrALAZINE 25 milliGRAM(s) Oral three times a day  insulin glargine Injectable (LANTUS) 10 Unit(s) SubCutaneous at bedtime  insulin lispro (ADMELOG) corrective regimen sliding scale   SubCutaneous three times a day before meals  insulin lispro (ADMELOG) corrective regimen sliding scale   SubCutaneous at bedtime  iron sucrose Injectable 100 milliGRAM(s) IV Push <User Schedule>  levothyroxine 75 MICROGram(s) Oral daily  loratadine 10 milliGRAM(s) Oral daily  losartan 25 milliGRAM(s) Oral daily  pantoprazole    Tablet 40 milliGRAM(s) Oral before breakfast  polyethylene glycol 3350 17 Gram(s) Oral daily  senna 2 Tablet(s) Oral at bedtime      LABS:  05-11    134<L>  |  100  |  45<H>  ----------------------------<  155<H>  4.2   |  23  |  3.71<H>    Ca    8.5      11 May 2022 06:51  Phos  4.7     05-11  Mg     2.40     05-11      Creatinine Trend: 3.71 <--, 2.73 <--, 3.15 <--, 2.71 <--, 3.17 <--, 3.02 <--, 3.99 <--    Phosphorus Level, Serum: 4.7 mg/dL (05-11 @ 06:51)                              9.2    9.12  )-----------( 219      ( 11 May 2022 06:51 )             29.1     Urine Studies:      Iron 37, TIBC 252, %sat 15      [05-07-22 @ 07:00]  Ferritin 83      [05-07-22 @ 07:00]  PTH -- (Ca --)      [05-07-22 @ 06:59]   204  TSH 1.55      [05-04-22 @ 06:42]    HBsAb <3.0      [05-04-22 @ 05:51]  HBsAg Reactive      [05-04-22 @ 05:02]  HBcAb Reactive      [05-04-22 @ 05:51]  HCV 0.13, Nonreact      [05-04-22 @ 05:51]      RADIOLOGY & ADDITIONAL STUDIES:   [facility] [place] NE[facility]ROLOGY PRACTICE   MD ANA KAPADIA MD KRISTINE SOLTANPOUR, ADELE GUILLEN    TEL:  OFFICE: [phone]  From 5pm-7am Answering Service [phone]    -- RENAL FOLLOW UP NOTE ---Date of Service 05-11-22 @ 15:09    Patient is a 66y old  Female who presents with a chief complaint of Shortness of breath     (11 May 2022 13:43)      Patient seen and examined at bedside. No chest pain/sob    VITALS:  T(F): 97.8 (05-11-22 @ 14:40), Max: 98.9 (05-11-22 @ 03:50)  HR: 71 (05-11-22 @ 14:40)  BP: 149/56 (05-11-22 @ 14:40)  RR: 17 (05-11-22 @ 14:40)  SpO2: 99% (05-11-22 @ 09:00)  Wt(kg): --    05-10 @ 07:01  -  05-11 @ 07:00  --------------------------------------------------------  IN: 100 mL / OUT: 0 mL / NET: 100 mL    05-11 @ 07:01  -  05-11 @ 15:09  --------------------------------------------------------  IN: 400 mL / OUT: 1900 mL / NET: -1500 mL          PHYSICAL EXAM:  Constitutional: NAD  Neck: No JVD  Respiratory: CTAB, no wheezes, rales or rhonchi  Cardiovascular: S1, S2, RRR  Gastrointestinal: BS+, soft, NT/ND  Extremities: No peripheral edema  Access: Left AVF with positive thrill     [facility] Medications:   MEDICATIONS  (STANDING):  aspirin enteric coated 81 milliGRAM(s) Oral daily  atorvastatin 20 milliGRAM(s) Oral at bedtime  calcium acetate 1334 milliGRAM(s) Oral three times a day with meals  carvedilol 6.25 milliGRAM(s) Oral every 12 hours  chlorhexidine 2% Cloths 1 Application(s) Topical daily  cyanocobalamin 1000 MICROGram(s) Oral daily  dextrose 5%. 1000 milliLiter(s) (100 mL/Hr) IV Continuous <Continuous>  dextrose 5%. 1000 milliLiter(s) (50 mL/Hr) IV Continuous <Continuous>  dextrose 50% Injectable 25 Gram(s) IV Push once  dextrose 50% Injectable 12.5 Gram(s) IV Push once  dextrose 50% Injectable 25 Gram(s) IV Push once  epoetin denise-epbx (RETACRIT) Injectable 45687 Unit(s) IV Push <User Schedule>  folic acid 1 milliGRAM(s) Oral daily  gabapentin 300 milliGRAM(s) Oral daily  glucagon  Injectable 1 milliGRAM(s) IntraMuscular once  hydrALAZINE 25 milliGRAM(s) Oral three times a day  insulin glargine Injectable (LANTUS) 10 Unit(s) SubCutaneous at bedtime  insulin lispro (ADMELOG) corrective regimen sliding scale   SubCutaneous three times a day before meals  insulin lispro (ADMELOG) corrective regimen sliding scale   SubCutaneous at bedtime  iron sucrose Injectable 100 milliGRAM(s) IV Push <User Schedule>  levothyroxine 75 MICROGram(s) Oral daily  loratadine 10 milliGRAM(s) Oral daily  losartan 25 milliGRAM(s) Oral daily  pantoprazole    Tablet 40 milliGRAM(s) Oral before breakfast  polyethylene glycol 3350 17 Gram(s) Oral daily  senna 2 Tablet(s) Oral at bedtime      LABS:  05-11    134<L>  |  100  |  45<H>  ----------------------------<  155<H>  4.2   |  23  |  3.71<H>    Ca    8.5      11 May 2022 06:51  Phos  4.7     05-11  Mg     2.40     05-11      Creatinine Trend: 3.71 <--, 2.73 <--, 3.15 <--, 2.71 <--, 3.17 <--, 3.02 <--, 3.99 <--    Phosphorus Level, Serum: 4.7 mg/dL (05-11 @ 06:51)                              9.2    9.12  )-----------( 219      ( 11 May 2022 06:51 )             29.1     Urine Studies:      Iron 37, TIBC 252, %sat 15      [05-07-22 @ 07:00]  Ferritin 83      [05-07-22 @ 07:00]  PTH -- (Ca --)      [05-07-22 @ 06:59]   204  TSH 1.55      [05-04-22 @ 06:42]    HBsAb <3.0      [05-04-22 @ 05:51]  HBsAg Reactive      [05-04-22 @ 05:02]  HBcAb Reactive      [05-04-22 @ 05:51]  HCV 0.13, Nonreact      [05-04-22 @ 05:51]      RADIOLOGY & ADDITIONAL STUDIES:

## 2022-05-11 NOTE — PROGRESS NOTE ADULT - ASSESSMENT
67yo F with Hx HTN, HLD, DM, hypothyroidism, CKD who presented with SOB and worsening renal failure requiring initially of HD s/p LUE AVF 5/10.     PLAN:   - C/w HD per Nephrology  - Please have patient f/u as OP with Dr. Culver after discharge       Eliana Cuevas, PGY-2  C Team Surgery  #[id number]  65yo F with Hx HTN, HLD, DM, hypothyroidism, CKD who presented with SOB and worsening renal failure requiring initially of HD s/p LUE AVF 5/10.     PLAN:   - C/w HD per Nephrology  - Please have patient f/u as OP with Dr. Culver after discharge     Steffi Culver (MD)  Surgery; Vascular Surgery  [address] [facility], [address]  [place] [address]  Phone: [phone]  Fax: [phone]      Eliana Cuevas, PGY-2  C Team Surgery  #[id number]

## 2022-05-11 NOTE — DISCHARGE NOTE NURSING/CASE MANAGEMENT/SOCIAL WORK - NSDCCRNAME_GEN_ALL_CORE_FT
[facility] [id number] [facility] [id number] [phone]. Dialysis scheduled for Tuesday, Thursday and Saturday at 10am. Admission appointment Tuesday 5/17/2022 at 10am

## 2022-05-11 NOTE — PROGRESS NOTE ADULT - ASSESSMENT
65 yo female with ho HTN, DM, HLD, CKD, hypothyroidism present with progressive SOB over last 3 days with LE edema, abd bloating and periorbital edema c/w ESRD with fluid overload.  Pt to start HD this admission.    Problem/Plan - 1:  ·  Problem: ESRD on hemodialysis.   ·  Plan: pt with advanced CKD now req HD  IR placed non tunneled catheter 5/3/22 for HD   vasc consulted for AVF, placed LUE 5/10/22  HD per nephrology  pt rec'd Lasix 40 mg in ED  cont Ca acetate, oral bicarb for now  Cardiac clearance appreciated  IR-guided conversion to tunneled catheter planned this week    Problem/Plan - 2:  ·  Problem: HTN (hypertension).   ·  Plan: cont coreg, nifedipine, hydralazine with hold parameters  cont ASA  elevated trop with no delta, likely due to renal dz, pt without CP.    Problem/Plan - 3:  ·  Problem: Diabetes mellitus, type 2.   ·  Plan: pt on lantus, 70/30, trulicity at home  will cont lantus here with sliding scale for now  adjust as needed  a1c = 5.6    Problem/Plan - 4:  ·  Problem: Hypothyroidism.   ·  Plan: cont synthroid  TSH = 1.55.    Problem/Plan - 5:  ·  Problem: Anemia of chronic disease.   ·  Plan: likely multifactorial  B12 def, ESRD.    Problem/Plan - 6:  ·  Problem: Hepatitis B  Hepatology following  HBeAg/ab pending   will need follow up outpt    DVT ppx

## 2022-05-11 NOTE — PROGRESS NOTE ADULT - SUBJECTIVE AND OBJECTIVE BOX
Subjective:  Patient seen at bedside this AM. Reports feeling well, without complaints.    24h Events:   - Overnight, no acute events    Objective:  Vital Signs  T(C): 37.2 (05-11 @ 03:50), Max: 37.2 (05-11 @ 03:50)  HR: 70 (05-11 @ 03:50) (54 - 70)  BP: 133/60 (05-11 @ 03:50) (131/52 - 183/66)  RR: 18 (05-11 @ 03:50) (13 - 18)  SpO2: 99% (05-11 @ 03:50) (95% - 100%)      Physical Exam:  GEN: resting in bed comfortably in NAD  NEURO: awake, alert  RESP: no increased WOB  EXTR: LUE warm, well-perfused, palpable radial + ulnar pulses, sensory + motor intact, dressing c/d/i      Labs:             9.2    9.12  )-----------( 219      ( 11 May 2022 06:51 )             29.1     133<L>  |  96<L>  |  32<H>  ----------------------------<  142<H>  3.6   |  26  |  2.73<H>    Ca    8.1<L>      10 May 2022 04:16  Phos  3.6     05-10  Mg     2.10     05-10    POCT Blood Glucose.: 311 mg/dL (10 May 2022 22:35)  POCT Blood Glucose.: 104 mg/dL (10 May 2022 17:42)  POCT Blood Glucose.: 102 mg/dL (10 May 2022 17:07)  POCT Blood Glucose.: 100 mg/dL (10 May 2022 14:47)  POCT Blood Glucose.: 170 mg/dL (10 May 2022 09:03)      Medications:   MEDICATIONS  (STANDING):  aspirin enteric coated 81 milliGRAM(s) Oral daily  atorvastatin 20 milliGRAM(s) Oral at bedtime  calcium acetate 1334 milliGRAM(s) Oral three times a day with meals  carvedilol 6.25 milliGRAM(s) Oral every 12 hours  chlorhexidine 2% Cloths 1 Application(s) Topical daily  cyanocobalamin 1000 MICROGram(s) Oral daily  dextrose 5%. 1000 milliLiter(s) (100 mL/Hr) IV Continuous <Continuous>  dextrose 5%. 1000 milliLiter(s) (50 mL/Hr) IV Continuous <Continuous>  dextrose 50% Injectable 25 Gram(s) IV Push once  dextrose 50% Injectable 12.5 Gram(s) IV Push once  dextrose 50% Injectable 25 Gram(s) IV Push once  epoetin denise-epbx (RETACRIT) Injectable 33998 Unit(s) IV Push <User Schedule>  folic acid 1 milliGRAM(s) Oral daily  gabapentin 300 milliGRAM(s) Oral daily  glucagon  Injectable 1 milliGRAM(s) IntraMuscular once  hydrALAZINE 25 milliGRAM(s) Oral three times a day  insulin glargine Injectable (LANTUS) 10 Unit(s) SubCutaneous at bedtime  insulin lispro (ADMELOG) corrective regimen sliding scale   SubCutaneous three times a day before meals  insulin lispro (ADMELOG) corrective regimen sliding scale   SubCutaneous at bedtime  iron sucrose Injectable 100 milliGRAM(s) IV Push <User Schedule>  levothyroxine 75 MICROGram(s) Oral daily  loratadine 10 milliGRAM(s) Oral daily  losartan 25 milliGRAM(s) Oral daily  pantoprazole    Tablet 40 milliGRAM(s) Oral before breakfast  polyethylene glycol 3350 17 Gram(s) Oral daily  senna 2 Tablet(s) Oral at bedtime    MEDICATIONS  (PRN):  acetaminophen     Tablet .. 650 milliGRAM(s) Oral every 6 hours PRN Mild Pain (1 - 3), Moderate Pain (4 - 6), Severe Pain (7 - 10)  dextrose Oral Gel 15 Gram(s) Oral once PRN Blood Glucose LESS THAN 70 milliGRAM(s)/deciliter  sodium chloride 0.9% lock flush 10 milliLiter(s) IV Push every 1 hour PRN Pre/post blood products, medications, blood draw, and to maintain line patency

## 2022-05-11 NOTE — DIETITIAN INITIAL EVALUATION ADULT - PERTINENT MEDS FT
MEDICATIONS  (STANDING):  aspirin enteric coated 81 milliGRAM(s) Oral daily  atorvastatin 20 milliGRAM(s) Oral at bedtime  calcium acetate 1334 milliGRAM(s) Oral three times a day with meals  carvedilol 6.25 milliGRAM(s) Oral every 12 hours  chlorhexidine 2% Cloths 1 Application(s) Topical daily  cyanocobalamin 1000 MICROGram(s) Oral daily  dextrose 5%. 1000 milliLiter(s) (100 mL/Hr) IV Continuous <Continuous>  dextrose 5%. 1000 milliLiter(s) (50 mL/Hr) IV Continuous <Continuous>  dextrose 50% Injectable 25 Gram(s) IV Push once  dextrose 50% Injectable 12.5 Gram(s) IV Push once  dextrose 50% Injectable 25 Gram(s) IV Push once  epoetin denise-epbx (RETACRIT) Injectable 88101 Unit(s) IV Push <User Schedule>  folic acid 1 milliGRAM(s) Oral daily  gabapentin 300 milliGRAM(s) Oral daily  glucagon  Injectable 1 milliGRAM(s) IntraMuscular once  hydrALAZINE 25 milliGRAM(s) Oral three times a day  insulin glargine Injectable (LANTUS) 10 Unit(s) SubCutaneous at bedtime  insulin lispro (ADMELOG) corrective regimen sliding scale   SubCutaneous three times a day before meals  insulin lispro (ADMELOG) corrective regimen sliding scale   SubCutaneous at bedtime  iron sucrose Injectable 100 milliGRAM(s) IV Push <User Schedule>  levothyroxine 75 MICROGram(s) Oral daily  loratadine 10 milliGRAM(s) Oral daily  losartan 25 milliGRAM(s) Oral daily  pantoprazole    Tablet 40 milliGRAM(s) Oral before breakfast  polyethylene glycol 3350 17 Gram(s) Oral daily  senna 2 Tablet(s) Oral at bedtime    MEDICATIONS  (PRN):  acetaminophen     Tablet .. 650 milliGRAM(s) Oral every 6 hours PRN Mild Pain (1 - 3), Moderate Pain (4 - 6), Severe Pain (7 - 10)  dextrose Oral Gel 15 Gram(s) Oral once PRN Blood Glucose LESS THAN 70 milliGRAM(s)/deciliter  sodium chloride 0.9% lock flush 10 milliLiter(s) IV Push every 1 hour PRN Pre/post blood products, medications, blood draw, and to maintain line patency

## 2022-05-11 NOTE — DIETITIAN INITIAL EVALUATION ADULT - ADD RECOMMEND
1) Monitor weights, PO intake/diet tolerance, skin integrity, pertinent labs.   2) Please consistently document % PO intake in nursing flowsheets to assess adequacy of nutritional intake/monitor need for further nutritional intervention(s).   3) Encourage High Biological Value Protein sources (i.e. chicken, turkey, beef, fish, eggs, etc.).

## 2022-05-11 NOTE — CONSULT NOTE ADULT - SUBJECTIVE AND OBJECTIVE BOX
HPI:  67 yo F w/ PMHx DM, HTN, HLD, CKD admitted 5/3/22 for SOB, volume overload and HD initiation. Patient initially had IR HD line placed, tolerated HD, subsequently had AVF creation 5/10/22.     Hepatology consulted for HBV. On admission, patient noted to have elevated liver enzymes, AST 52, ALT 93, alk phos 132, T bili wnl. During admission ALT fluctuated , INR 1.2. HBVsAg positive, HBVcAb positive, HBV PCR quant 95. No abd imaging.     Patient was born in [facility], moved here 50 years ago. States no known h/o liver disease, no h/o IVDU, no prior jaundice. No FHx of liver disease. no prior body piercing no tattoos, no surgeries, no transfusions. 6 children (vaginally) w/o any known liver disease.     Allergies:  No Known Allergies      Home Medications:    Hospital Medications:  acetaminophen     Tablet .. 650 milliGRAM(s) Oral every 6 hours PRN  aspirin enteric coated 81 milliGRAM(s) Oral daily  atorvastatin 20 milliGRAM(s) Oral at bedtime  calcium acetate 1334 milliGRAM(s) Oral three times a day with meals  carvedilol 6.25 milliGRAM(s) Oral every 12 hours  chlorhexidine 2% Cloths 1 Application(s) Topical daily  cyanocobalamin 1000 MICROGram(s) Oral daily  dextrose 5%. 1000 milliLiter(s) IV Continuous <Continuous>  dextrose 5%. 1000 milliLiter(s) IV Continuous <Continuous>  dextrose 50% Injectable 25 Gram(s) IV Push once  dextrose 50% Injectable 12.5 Gram(s) IV Push once  dextrose 50% Injectable 25 Gram(s) IV Push once  dextrose Oral Gel 15 Gram(s) Oral once PRN  epoetin denise-epbx (RETACRIT) Injectable 12699 Unit(s) IV Push <User Schedule>  folic acid 1 milliGRAM(s) Oral daily  gabapentin 300 milliGRAM(s) Oral daily  glucagon  Injectable 1 milliGRAM(s) IntraMuscular once  hydrALAZINE 25 milliGRAM(s) Oral three times a day  insulin glargine Injectable (LANTUS) 10 Unit(s) SubCutaneous at bedtime  insulin lispro (ADMELOG) corrective regimen sliding scale   SubCutaneous three times a day before meals  insulin lispro (ADMELOG) corrective regimen sliding scale   SubCutaneous at bedtime  iron sucrose Injectable 100 milliGRAM(s) IV Push <User Schedule>  levothyroxine 75 MICROGram(s) Oral daily  loratadine 10 milliGRAM(s) Oral daily  losartan 25 milliGRAM(s) Oral daily  pantoprazole    Tablet 40 milliGRAM(s) Oral before breakfast  polyethylene glycol 3350 17 Gram(s) Oral daily  senna 2 Tablet(s) Oral at bedtime  sodium chloride 0.9% lock flush 10 milliLiter(s) IV Push every 1 hour PRN      PMHX/PSHX:  HTN (hypertension)    HLD (hyperlipidemia)        Family history:  No pertinent family history in first degree relatives        Denies family history of colon cancer/polyps, stomach cancer/polyps, pancreatic cancer/masses, liver cancer/disease, ovarian cancer and endometrial cancer.    Social History:   Tob: Denies  EtOH: Denies  Illicit Drugs: Denies    ROS:     General:  No wt loss, fevers, chills, night sweats, fatigue  Eyes:  Good vision, no reported pain  ENT:  No sore throat, pain, runny nose, dysphagia  CV:  No pain, palpitations, hypo/hypertension  Pulm:  No dyspnea, cough, tachypnea, wheezing  GI:  see HPI  :  No pain, bleeding, incontinence, nocturia  Muscle:  No pain, weakness  Neuro:  No weakness, tingling, memory problems  Psych:  No fatigue, insomnia, mood problems, depression  Endocrine:  No polyuria, polydipsia, cold/heat intolerance  Heme:  No petechiae, ecchymosis, easy bruisability  Skin:  No rash, tattoos, scars, edema    PHYSICAL EXAM:     GENERAL:  No acute distress  HEENT:  NCAT, no scleral icterus   CHEST:  no respiratory distress  HEART:  Regular rate and rhythm  ABDOMEN:  Soft, non-tender, non-distended, normoactive bowel sounds,  no masses  EXTREMITIES: No edema, LUE fistula creation  SKIN:  No rash/erythema/ecchymoses/petechiae/wounds/abscess/warm/dry  NEURO:  Alert and oriented x 3, no asterixis    Vital Signs:  Vital Signs Last 24 Hrs  T(C): 37.2 (11 May 2022 03:50), Max: 37.2 (11 May 2022 03:50)  T(F): 98.9 (11 May 2022 03:50), Max: 98.9 (11 May 2022 03:50)  HR: 70 (11 May 2022 03:50) (54 - 70)  BP: 133/60 (11 May 2022 03:50) (131/52 - 183/66)  BP(mean): 88 (10 May 2022 17:00) (83 - 124)  RR: 18 (11 May 2022 03:50) (13 - 18)  SpO2: 99% (11 May 2022 03:50) (95% - 100%)  Daily     Daily     LABS:                        9.2    9.12  )-----------( 219      ( 11 May 2022 06:51 )             29.1     Mean Cell Volume: 88.7 fL (05-11-22 @ 06:51)    05-11    134<L>  |  100  |  45<H>  ----------------------------<  155<H>  4.2   |  23  |  3.71<H>    Ca    8.5      11 May 2022 06:51  Phos  4.7     05-11  Mg     2.40     05-11        PT/INR - ( 10 May 2022 04:16 )   PT: 12.8 sec;   INR: 1.10 ratio         PTT - ( 10 May 2022 04:16 )  PTT:26.7 sec                            9.2    9.12  )-----------( 219      ( 11 May 2022 06:51 )             29.1                         8.2    7.05  )-----------( 160      ( 10 May 2022 04:16 )             25.4                         8.0    6.23  )-----------( 156      ( 09 May 2022 07:19 )             25.8       Imaging:

## 2022-05-11 NOTE — DIETITIAN INITIAL EVALUATION ADULT - PERTINENT LABORATORY DATA
05-11    134<L>  |  100  |  45<H>  ----------------------------<  155<H>  4.2   |  23  |  3.71<H>    Ca    8.5      11 May 2022 06:51  Phos  4.7     05-11  Mg     2.40     05-11    POCT Blood Glucose.: 115 mg/dL (05-11-22 @ 12:44)  A1C with Estimated Average Glucose Result: 5.6 % (05-04-22 @ 06:42)  A1C with Estimated Average Glucose Result: 5.7 % (03-11-22 @ 07:51)    CAPILLARY BLOOD GLUCOSE      POCT Blood Glucose.: 115 mg/dL (11 May 2022 12:44)  POCT Blood Glucose.: 162 mg/dL (11 May 2022 09:06)  POCT Blood Glucose.: 311 mg/dL (10 May 2022 22:35)  POCT Blood Glucose.: 104 mg/dL (10 May 2022 17:42)  POCT Blood Glucose.: 102 mg/dL (10 May 2022 17:07)  POCT Blood Glucose.: 100 mg/dL (10 May 2022 14:47)

## 2022-05-11 NOTE — CONSULT NOTE ADULT - ASSESSMENT
Impression:  67 yo F w/ PMHx DM, HTN, HLD, CKD V admitted for HD initiation, hepatology consulted for positive HBV serologies    # HBV - patient w/ evidence of ongoing HBV, evidenced by positive HBVsAg and HBVcAb, HBV PCR quant 95. This likely represents chronic HBV (>6 weeks), however at this time cannot confirm that. In management will assess chronicity by evaluating HBeAb (if positive likely represents chronicity). Will assess for delta co-infection (unlikely as no IVDU). Will monitor for sequale of longstanding HBV and assess degree of liver damage (RUQ US, MR elastography). Patient has low viral load and mild elevation in ALT, which likely represents immune control stage (vs immune active). Additionally, patient has longstanding CKD now in ESRD w/ HD, which has been attributed to metabolic syndrome, however would want to r/o HBV glomerulonephritis. Additionally, will evaluate for other extra-hepatic manifestations of HBV (ex vasculitis)    Recommendations:  - trend liver enzymes  - check HBe antigen and antibody  - check hepatitis delta antibody  - RUQ US  - MR elastography  - check serum immune complexes  - check serum complements (C3, C4, total complements)  - clarify with nephrology if patient ever had further workup for CKD including renal biopsy as outpatient  - check ESR/CRP  - patient will likely be initiated on antiviral (likely entecavir)  - rest of care per primary team    GI will continue to follow.     All recommendations are tentative until note is attested by attending.     Jonatan Boone, PGY5  Gastroenterology/Hepatology Fellow  Available on [employer] Teams  [phone] ([employer] Short Range Pager)  [phone] (Long Range Pager)    After 5pm, please contact the on-call GI fellow. [phone]

## 2022-05-11 NOTE — CONSULT NOTE ADULT - CONSULT REQUESTED DATE/TIME
03-May-2022 14:42
11-May-2022 11:14
03-May-2022 15:10
03-May-2022
11-May-2022 10:55
07-May-2022 17:20
03-May-2022 12:41

## 2022-05-11 NOTE — DIETITIAN INITIAL EVALUATION ADULT - NS FNS DIET ORDER
Diet, NPO after Midnight:      NPO Start Date: 11-May-2022,   NPO Start Time: 23:59  Except Medications (05-11-22 @ 12:53)  Diet, Regular:   Consistent Carbohydrate {Evening Snack} (CSTCHOSN)  DASH/TLC {Sodium & Cholesterol Restricted} (DASH)  For patients receiving Renal Replacement - No Protein Restr, No Conc K, No Conc Phos, Low Sodium (RENAL) (05-10-22 @ 18:34)

## 2022-05-11 NOTE — PROGRESS NOTE ADULT - ASSESSMENT
66F PMH CKD 5 not on hd, HTN, HLD, DM2, hypothyroid recent hospital admission Mar 2022 with plans for starting outpt dialysis presenting with fluid overload, worsen renal function, need to initiate dialysis,    CKD stage 5 now on HD ---> ESRD on HD 5/3/2022  now with worsen renal function, mild uremic symptom and fluid overload  initiated HD 5/3   s/p shiley 5/3 with IR  permacath today by IR  AVF left upper arm created on 5/10/2022  HD today 5/11  s/p HD on 5/9  consent in chart.   - Monitor BMP   - Avoid nephrotoxics, NSAIDS RCA    Fluid overload  sec to renal failure  UF with HD as tolerated  f/u cardio    HTN   Optimal   on losartan 25 daily and Carvedilol 6.25 mg po every 12 hours.   monitor BP       anemia  iron sat 15%  will start iron with hd  epo with HD once bp is better  monitor  transfuse to keep hb>7    hyponatremia  likely fluid overload  free water restriction <1L/day    Hyperphosphatemia  continue  binders  low PO4 diet  monitor    hypocalcemia  optimal PTH for ckd  improved  monitor    hypokalemia  Monitor  hd with high k bath    Hepatitis B  known  per daughter did not follow up with hepatology  +hep b PCR  consider hepatology eval

## 2022-05-11 NOTE — PROGRESS NOTE ADULT - SUBJECTIVE AND OBJECTIVE BOX
SUBJECTIVE / OVERNIGHT EVENTS:  no cp, no sob, no n/v/d. no abdominal pain.  no headache, no dizziness.       --------------------------------------------------------------------------------------------  LABS:                        9.2    9.12  )-----------( 219      ( 11 May 2022 06:51 )             29.1     05-11    134<L>  |  100  |  45<H>  ----------------------------<  155<H>  4.2   |  23  |  3.71<H>    Ca    8.5      11 May 2022 06:51  Phos  4.7     05-11  Mg     2.40     05-11      PT/INR - ( 10 May 2022 04:16 )   PT: 12.8 sec;   INR: 1.10 ratio         PTT - ( 10 May 2022 04:16 )  PTT:26.7 sec  CAPILLARY BLOOD GLUCOSE  195 (11 May 2022 18:00)      POCT Blood Glucose.: 115 mg/dL (11 May 2022 12:44)  POCT Blood Glucose.: 162 mg/dL (11 May 2022 09:06)  POCT Blood Glucose.: 311 mg/dL (10 May 2022 22:35)            RADIOLOGY & ADDITIONAL TESTS:    Imaging Personally Reviewed:  [x] YES  [ ] NO    Consultant(s) Notes Reviewed:  [x] YES  [ ] NO    MEDICATIONS  (STANDING):  aspirin enteric coated 81 milliGRAM(s) Oral daily  atorvastatin 20 milliGRAM(s) Oral at bedtime  calcium acetate 1334 milliGRAM(s) Oral three times a day with meals  carvedilol 6.25 milliGRAM(s) Oral every 12 hours  chlorhexidine 2% Cloths 1 Application(s) Topical daily  cyanocobalamin 1000 MICROGram(s) Oral daily  dextrose 5%. 1000 milliLiter(s) (100 mL/Hr) IV Continuous <Continuous>  dextrose 5%. 1000 milliLiter(s) (50 mL/Hr) IV Continuous <Continuous>  dextrose 50% Injectable 25 Gram(s) IV Push once  dextrose 50% Injectable 12.5 Gram(s) IV Push once  dextrose 50% Injectable 25 Gram(s) IV Push once  epoetin denise-epbx (RETACRIT) Injectable 85425 Unit(s) IV Push <User Schedule>  folic acid 1 milliGRAM(s) Oral daily  gabapentin 300 milliGRAM(s) Oral daily  glucagon  Injectable 1 milliGRAM(s) IntraMuscular once  hydrALAZINE 25 milliGRAM(s) Oral three times a day  insulin glargine Injectable (LANTUS) 10 Unit(s) SubCutaneous at bedtime  insulin lispro (ADMELOG) corrective regimen sliding scale   SubCutaneous three times a day before meals  insulin lispro (ADMELOG) corrective regimen sliding scale   SubCutaneous at bedtime  iron sucrose Injectable 100 milliGRAM(s) IV Push <User Schedule>  levothyroxine 75 MICROGram(s) Oral daily  loratadine 10 milliGRAM(s) Oral daily  losartan 25 milliGRAM(s) Oral daily  pantoprazole    Tablet 40 milliGRAM(s) Oral before breakfast  polyethylene glycol 3350 17 Gram(s) Oral daily  senna 2 Tablet(s) Oral at bedtime    MEDICATIONS  (PRN):  acetaminophen     Tablet .. 650 milliGRAM(s) Oral every 6 hours PRN Mild Pain (1 - 3), Moderate Pain (4 - 6), Severe Pain (7 - 10)  dextrose Oral Gel 15 Gram(s) Oral once PRN Blood Glucose LESS THAN 70 milliGRAM(s)/deciliter  sodium chloride 0.9% lock flush 10 milliLiter(s) IV Push every 1 hour PRN Pre/post blood products, medications, blood draw, and to maintain line patency      Care Discussed with Consultants/Other Providers [x] YES  [ ] NO    Vital Signs Last 24 Hrs  T(C): 36.6 (11 May 2022 17:00), Max: 37.2 (11 May 2022 03:50)  T(F): 97.8 (11 May 2022 17:00), Max: 98.9 (11 May 2022 03:50)  HR: 64 (11 May 2022 17:00) (64 - 78)  BP: 151/62 (11 May 2022 17:00) (131/52 - 170/69)  BP(mean): --  RR: 18 (11 May 2022 17:00) (17 - 18)  SpO2: 99% (11 May 2022 17:00) (98% - 100%)  I&O's Summary    10 May 2022 07:01  -  11 May 2022 07:00  --------------------------------------------------------  IN: 100 mL / OUT: 0 mL / NET: 100 mL    11 May 2022 07:01  -  11 May 2022 20:33  --------------------------------------------------------  IN: 400 mL / OUT: 1900 mL / NET: -1500 mL      PHYSICAL EXAM:  GENERAL: NAD, COMFORTABLE  HEENT: NCAT, EOMI  NECK: Supple, No JVD  CHEST/LUNG: mild decrease breath sounds bilaterally; No wheeze, + Shiley cath   HEART: Regular rate and rhythm; nl S1/S2; No murmurs, rubs, or gallops  ABDOMEN: Soft, Nontender, Nondistended; Bowel sounds present; No hepatosplenomegaly  EXTREMITIES:  2+ Peripheral Pulses; residual b/l LE edema

## 2022-05-11 NOTE — PROGRESS NOTE ADULT - SUBJECTIVE AND OBJECTIVE BOX
Gary Morrison MD  Interventional Cardiology / Endovascular Specialist  [place] Office : [address] [place] [address] [place] [address]  Tel:   [place] Office : [address] [facility] [place] [address]  Tel: [phone]      Subjective/Overnight events: Patient lying in bed comfortably. No acute distress.   	  MEDICATIONS:  aspirin enteric coated 81 milliGRAM(s) Oral daily  carvedilol 6.25 milliGRAM(s) Oral every 12 hours  hydrALAZINE 25 milliGRAM(s) Oral three times a day  losartan 25 milliGRAM(s) Oral daily      loratadine 10 milliGRAM(s) Oral daily    acetaminophen     Tablet .. 650 milliGRAM(s) Oral every 6 hours PRN  gabapentin 300 milliGRAM(s) Oral daily    pantoprazole    Tablet 40 milliGRAM(s) Oral before breakfast  polyethylene glycol 3350 17 Gram(s) Oral daily  senna 2 Tablet(s) Oral at bedtime    atorvastatin 20 milliGRAM(s) Oral at bedtime  dextrose 50% Injectable 25 Gram(s) IV Push once  dextrose 50% Injectable 12.5 Gram(s) IV Push once  dextrose 50% Injectable 25 Gram(s) IV Push once  dextrose Oral Gel 15 Gram(s) Oral once PRN  glucagon  Injectable 1 milliGRAM(s) IntraMuscular once  insulin glargine Injectable (LANTUS) 10 Unit(s) SubCutaneous at bedtime  insulin lispro (ADMELOG) corrective regimen sliding scale   SubCutaneous three times a day before meals  insulin lispro (ADMELOG) corrective regimen sliding scale   SubCutaneous at bedtime  levothyroxine 75 MICROGram(s) Oral daily    calcium acetate 1334 milliGRAM(s) Oral three times a day with meals  chlorhexidine 2% Cloths 1 Application(s) Topical daily  cyanocobalamin 1000 MICROGram(s) Oral daily  dextrose 5%. 1000 milliLiter(s) IV Continuous <Continuous>  dextrose 5%. 1000 milliLiter(s) IV Continuous <Continuous>  epoetin denise-epbx (RETACRIT) Injectable 92562 Unit(s) IV Push <User Schedule>  folic acid 1 milliGRAM(s) Oral daily  iron sucrose Injectable 100 milliGRAM(s) IV Push <User Schedule>  sodium chloride 0.9% lock flush 10 milliLiter(s) IV Push every 1 hour PRN      PAST MEDICAL/SURGICAL HISTORY  PAST MEDICAL & SURGICAL HISTORY:  HTN (hypertension)      HLD (hyperlipidemia)          SOCIAL HISTORY: Substance Use (street drugs): ( x ) never used  (  ) other:    FAMILY HISTORY:          PHYSICAL EXAM:  T(C): 37.2 (05-11-22 @ 03:50), Max: 37.2 (05-11-22 @ 03:50)  HR: 65 (05-11-22 @ 11:54) (54 - 70)  BP: 170/69 (05-11-22 @ 11:54) (131/52 - 183/66)  RR: 18 (05-11-22 @ 11:54) (13 - 18)  SpO2: 99% (05-11-22 @ 03:50) (95% - 100%)  Wt(kg): --  I&O's Summary    10 May 2022 07:01  -  11 May 2022 07:00  --------------------------------------------------------  IN: 100 mL / OUT: 0 mL / NET: 100 mL            GENERAL: NAD  EYES: EOMI, PERRLA, conjunctiva and sclera clear  ENMT: No tonsillar erythema, exudates, or enlargement  Cardiovascular: Normal S1 S2, No JVD, No murmurs, No edema  Respiratory: Lungs clear to auscultation	  Gastrointestinal:  Soft, Non-tender, + BS	  Extremities: No edema                                    9.2    9.12  )-----------( 219      ( 11 May 2022 06:51 )             29.1     05-11    134<L>  |  100  |  45<H>  ----------------------------<  155<H>  4.2   |  23  |  3.71<H>    Ca    8.5      11 May 2022 06:51  Phos  4.7     05-11  Mg     2.40     05-11      proBNP:   Lipid Profile:   HgA1c:   TSH:     Consultant(s) Notes Reviewed:  [x ] YES  [ ] NO    Care Discussed with Consultants/Other Providers [ x] YES  [ ] NO    Imaging Personally Reviewed independently:  [x] YES  [ ] NO    All labs, radiologic studies, vitals, orders and medications list reviewed. Patient is seen and examined at bedside. Case discussed with medical team.                 Gary Morrison MD  Interventional Cardiology / Endovascular Specialist  [place] Office : [address] [place] [address] [place] [address]  Tel:   [place] Office : [address] [facility] [place] [address]  Tel: [phone]     ID [id number]  Subjective/Overnight events: Patient lying in bed comfortably. No acute distress.   	  MEDICATIONS:  aspirin enteric coated 81 milliGRAM(s) Oral daily  carvedilol 6.25 milliGRAM(s) Oral every 12 hours  hydrALAZINE 25 milliGRAM(s) Oral three times a day  losartan 25 milliGRAM(s) Oral daily      loratadine 10 milliGRAM(s) Oral daily    acetaminophen     Tablet .. 650 milliGRAM(s) Oral every 6 hours PRN  gabapentin 300 milliGRAM(s) Oral daily    pantoprazole    Tablet 40 milliGRAM(s) Oral before breakfast  polyethylene glycol 3350 17 Gram(s) Oral daily  senna 2 Tablet(s) Oral at bedtime    atorvastatin 20 milliGRAM(s) Oral at bedtime  dextrose 50% Injectable 25 Gram(s) IV Push once  dextrose 50% Injectable 12.5 Gram(s) IV Push once  dextrose 50% Injectable 25 Gram(s) IV Push once  dextrose Oral Gel 15 Gram(s) Oral once PRN  glucagon  Injectable 1 milliGRAM(s) IntraMuscular once  insulin glargine Injectable (LANTUS) 10 Unit(s) SubCutaneous at bedtime  insulin lispro (ADMELOG) corrective regimen sliding scale   SubCutaneous three times a day before meals  insulin lispro (ADMELOG) corrective regimen sliding scale   SubCutaneous at bedtime  levothyroxine 75 MICROGram(s) Oral daily    calcium acetate 1334 milliGRAM(s) Oral three times a day with meals  chlorhexidine 2% Cloths 1 Application(s) Topical daily  cyanocobalamin 1000 MICROGram(s) Oral daily  dextrose 5%. 1000 milliLiter(s) IV Continuous <Continuous>  dextrose 5%. 1000 milliLiter(s) IV Continuous <Continuous>  epoetin denise-epbx (RETACRIT) Injectable 82991 Unit(s) IV Push <User Schedule>  folic acid 1 milliGRAM(s) Oral daily  iron sucrose Injectable 100 milliGRAM(s) IV Push <User Schedule>  sodium chloride 0.9% lock flush 10 milliLiter(s) IV Push every 1 hour PRN      PAST MEDICAL/SURGICAL HISTORY  PAST MEDICAL & SURGICAL HISTORY:  HTN (hypertension)      HLD (hyperlipidemia)          SOCIAL HISTORY: Substance Use (street drugs): ( x ) never used  (  ) other:    FAMILY HISTORY:          PHYSICAL EXAM:  T(C): 37.2 (05-11-22 @ 03:50), Max: 37.2 (05-11-22 @ 03:50)  HR: 65 (05-11-22 @ 11:54) (54 - 70)  BP: 170/69 (05-11-22 @ 11:54) (131/52 - 183/66)  RR: 18 (05-11-22 @ 11:54) (13 - 18)  SpO2: 99% (05-11-22 @ 03:50) (95% - 100%)  Wt(kg): --  I&O's Summary    10 May 2022 07:01  -  11 May 2022 07:00  --------------------------------------------------------  IN: 100 mL / OUT: 0 mL / NET: 100 mL            GENERAL: NAD  EYES: EOMI, PERRLA, conjunctiva and sclera clear  ENMT: No tonsillar erythema, exudates, or enlargement  Cardiovascular: Normal S1 S2, No JVD, No murmurs, No edema  Respiratory: Lungs clear to auscultation	  Gastrointestinal:  Soft, Non-tender, + BS	  Extremities: No edema                                    9.2    9.12  )-----------( 219      ( 11 May 2022 06:51 )             29.1     05-11    134<L>  |  100  |  45<H>  ----------------------------<  155<H>  4.2   |  23  |  3.71<H>    Ca    8.5      11 May 2022 06:51  Phos  4.7     05-11  Mg     2.40     05-11      proBNP:   Lipid Profile:   HgA1c:   TSH:     Consultant(s) Notes Reviewed:  [x ] YES  [ ] NO    Care Discussed with Consultants/Other Providers [ x] YES  [ ] NO    Imaging Personally Reviewed independently:  [x] YES  [ ] NO    All labs, radiologic studies, vitals, orders and medications list reviewed. Patient is seen and examined at bedside. Case discussed with medical team.

## 2022-05-11 NOTE — PROGRESS NOTE ADULT - ASSESSMENT
66F PMH CKD, HTN, HLD, DM2, hypothyroid recent hospital admission Mar 2022 with plans for starting outpt dialysis presenting with 3 days of dyspnea at rest, associated with L shoulder pain, nausea, periorbital edema, b/l LE swelling, and abdominal bloating.    Tele: NSR    1. SOB  -likely 2/2 CKD   -trops elevated but flat 2/2 renal disease  -echo normal 3/2022 LV/RV , sever Phtn. stress 3/2022 with no ischemia   -improved on HD     2.  CKD  - on HD  - f/u renal recs    3. HTN  -BPcontrolled  -on hydral 10mg TID  -continue to monitor BP    4. Bradycardia   - asymptomatic monitor on tele   - hypokalemia s/p repletion     5. Post-op assessment  -denies CP, SOB or palpitations  -recent stress 3/2022 no evidence of infarct or ischemia  -s/p AVF doing well     DVT prophylaxis  -hep subq

## 2022-05-11 NOTE — CONSULT NOTE ADULT - ATTENDING COMMENTS
new onset ESRD now requiring HD  pt. needs HD access  will plan for AVF creation on this admission once medically optimized  protect L arm   vein mapping
Patient admitted to start dialysis. now found to have active HBV infection. mild liver test abnormality. Will need assessment of liver disease. suggest ultrasound imaging of liver and AFP and additional serology.  Evaluate possibility of extrahepatic contribution of HBV to etiology of renal failure. R/o presence of HCC and advise family to screen for HBV and vaccinate immediate family if not immune.  Decision about possible antiviral therapy to be made following evaluation as patient to be followed as outpatient.

## 2022-05-11 NOTE — DISCHARGE NOTE NURSING/CASE MANAGEMENT/SOCIAL WORK - NSDCPEFALRISK_GEN_ALL_CORE
For information on Fall & Injury Prevention, visit: [email or web address] OR  [email or web address] OR  https://www.cdc.gov/steadi/patient.html

## 2022-05-11 NOTE — CONSULT NOTE ADULT - CONSULT REASON
renal failure, fluid overload
HD access; non-tunnelled to tunnelled access conversion
dyspnea
HD Access
AV fistula
Perm cath placement
HBV

## 2022-05-11 NOTE — CONSULT NOTE ADULT - PROVIDER SPECIALTY LIST ADULT
Intervent Radiology
Hepatology
Intervent Radiology
Nephrology
Cardiology
Intervent Radiology
Vascular Surgery

## 2022-05-11 NOTE — DISCHARGE NOTE NURSING/CASE MANAGEMENT/SOCIAL WORK - PATIENT PORTAL LINK FT
You can access the FollowMyHealth Patient Portal offered by [facility] by registering at the following website: [email or web address]. By joining [facility]’s FollowMyHealth portal, you will also be able to view your health information using other applications (apps) compatible with our system.

## 2022-05-12 LAB
ANION GAP SERPL CALC-SCNC: 13 MMOL/L — SIGNIFICANT CHANGE UP (ref 7–14)
APTT BLD: 27.9 SEC — SIGNIFICANT CHANGE UP (ref 27–36.3)
BUN SERPL-MCNC: 40 MG/DL — HIGH (ref 7–23)
C3 SERPL-MCNC: 116 MG/DL — SIGNIFICANT CHANGE UP (ref 90–180)
C4 SERPL-MCNC: 56 MG/DL — HIGH (ref 10–40)
CALCIUM SERPL-MCNC: 8.4 MG/DL — SIGNIFICANT CHANGE UP (ref 8.4–10.5)
CHLORIDE SERPL-SCNC: 96 MMOL/L — LOW (ref 98–107)
CO2 SERPL-SCNC: 24 MMOL/L — SIGNIFICANT CHANGE UP (ref 22–31)
CREAT SERPL-MCNC: 3.29 MG/DL — HIGH (ref 0.5–1.3)
CRP SERPL-MCNC: 8.7 MG/L — HIGH
EGFR: 15 ML/MIN/1.73M2 — LOW
ERYTHROCYTE [SEDIMENTATION RATE] IN BLOOD: 68 MM/HR — HIGH (ref 4–25)
GLUCOSE SERPL-MCNC: 106 MG/DL — HIGH (ref 70–99)
HCT VFR BLD CALC: 27.5 % — LOW (ref 34.5–45)
HCV AB S/CO SERPL IA: 0.11 S/CO — SIGNIFICANT CHANGE UP (ref 0–0.99)
HCV AB SERPL-IMP: SIGNIFICANT CHANGE UP
HGB BLD-MCNC: 8.6 G/DL — LOW (ref 11.5–15.5)
INR BLD: 1.11 RATIO — SIGNIFICANT CHANGE UP (ref 0.88–1.16)
MAGNESIUM SERPL-MCNC: 2.2 MG/DL — SIGNIFICANT CHANGE UP (ref 1.6–2.6)
MCHC RBC-ENTMCNC: 27.8 PG — SIGNIFICANT CHANGE UP (ref 27–34)
MCHC RBC-ENTMCNC: 31.3 GM/DL — LOW (ref 32–36)
MCV RBC AUTO: 89 FL — SIGNIFICANT CHANGE UP (ref 80–100)
NRBC # BLD: 0 /100 WBCS — SIGNIFICANT CHANGE UP
NRBC # FLD: 0 K/UL — SIGNIFICANT CHANGE UP
PHOSPHATE SERPL-MCNC: 4.3 MG/DL — SIGNIFICANT CHANGE UP (ref 2.5–4.5)
PLATELET # BLD AUTO: 187 K/UL — SIGNIFICANT CHANGE UP (ref 150–400)
POTASSIUM SERPL-MCNC: 3.9 MMOL/L — SIGNIFICANT CHANGE UP (ref 3.5–5.3)
POTASSIUM SERPL-SCNC: 3.9 MMOL/L — SIGNIFICANT CHANGE UP (ref 3.5–5.3)
PROTHROM AB SERPL-ACNC: 12.9 SEC — SIGNIFICANT CHANGE UP (ref 10.5–13.4)
RBC # BLD: 3.09 M/UL — LOW (ref 3.8–5.2)
RBC # FLD: 13.6 % — SIGNIFICANT CHANGE UP (ref 10.3–14.5)
SODIUM SERPL-SCNC: 133 MMOL/L — LOW (ref 135–145)
WBC # BLD: 7.12 K/UL — SIGNIFICANT CHANGE UP (ref 3.8–10.5)
WBC # FLD AUTO: 7.12 K/UL — SIGNIFICANT CHANGE UP (ref 3.8–10.5)

## 2022-05-12 PROCEDURE — 99232 SBSQ HOSP IP/OBS MODERATE 35: CPT | Mod: GC

## 2022-05-12 PROCEDURE — 76705 ECHO EXAM OF ABDOMEN: CPT | Mod: 26

## 2022-05-12 PROCEDURE — 36558 INSERT TUNNELED CV CATH: CPT

## 2022-05-12 PROCEDURE — 77001 FLUOROGUIDE FOR VEIN DEVICE: CPT | Mod: 26,GC

## 2022-05-12 RX ORDER — ERYTHROPOIETIN 10000 [IU]/ML
10000 INJECTION, SOLUTION INTRAVENOUS; SUBCUTANEOUS
Refills: 0 | Status: DISCONTINUED | OUTPATIENT
Start: 2022-05-12 | End: 2022-05-14

## 2022-05-12 RX ORDER — CHLORHEXIDINE GLUCONATE 213 G/1000ML
1 SOLUTION TOPICAL
Refills: 0 | Status: DISCONTINUED | OUTPATIENT
Start: 2022-05-12 | End: 2022-05-14

## 2022-05-12 RX ADMIN — CHLORHEXIDINE GLUCONATE 1 APPLICATION(S): 213 SOLUTION TOPICAL at 14:26

## 2022-05-12 RX ADMIN — ERYTHROPOIETIN 10000 UNIT(S): 10000 INJECTION, SOLUTION INTRAVENOUS; SUBCUTANEOUS at 17:54

## 2022-05-12 RX ADMIN — ATORVASTATIN CALCIUM 20 MILLIGRAM(S): 80 TABLET, FILM COATED ORAL at 22:27

## 2022-05-12 RX ADMIN — Medication 1334 MILLIGRAM(S): at 14:31

## 2022-05-12 RX ADMIN — Medication 75 MICROGRAM(S): at 05:30

## 2022-05-12 RX ADMIN — Medication 25 MILLIGRAM(S): at 05:31

## 2022-05-12 RX ADMIN — Medication 25 MILLIGRAM(S): at 22:27

## 2022-05-12 RX ADMIN — Medication 2: at 22:26

## 2022-05-12 RX ADMIN — GABAPENTIN 300 MILLIGRAM(S): 400 CAPSULE ORAL at 14:31

## 2022-05-12 RX ADMIN — Medication 1: at 18:09

## 2022-05-12 RX ADMIN — Medication 1: at 12:16

## 2022-05-12 RX ADMIN — CARVEDILOL PHOSPHATE 6.25 MILLIGRAM(S): 80 CAPSULE, EXTENDED RELEASE ORAL at 05:31

## 2022-05-12 RX ADMIN — LORATADINE 10 MILLIGRAM(S): 10 TABLET ORAL at 14:32

## 2022-05-12 RX ADMIN — LOSARTAN POTASSIUM 25 MILLIGRAM(S): 100 TABLET, FILM COATED ORAL at 05:31

## 2022-05-12 RX ADMIN — Medication 81 MILLIGRAM(S): at 14:32

## 2022-05-12 RX ADMIN — PANTOPRAZOLE SODIUM 40 MILLIGRAM(S): 20 TABLET, DELAYED RELEASE ORAL at 06:29

## 2022-05-12 RX ADMIN — Medication 1 MILLIGRAM(S): at 14:31

## 2022-05-12 RX ADMIN — POLYETHYLENE GLYCOL 3350 17 GRAM(S): 17 POWDER, FOR SOLUTION ORAL at 14:30

## 2022-05-12 RX ADMIN — PREGABALIN 1000 MICROGRAM(S): 225 CAPSULE ORAL at 14:33

## 2022-05-12 RX ADMIN — SENNA PLUS 2 TABLET(S): 8.6 TABLET ORAL at 22:27

## 2022-05-12 RX ADMIN — INSULIN GLARGINE 10 UNIT(S): 100 INJECTION, SOLUTION SUBCUTANEOUS at 22:26

## 2022-05-12 NOTE — PROCEDURE NOTE - NSINFORMCONSENT_GEN_A_CORE
services used./Benefits, risks, and possible complications of procedure explained to patient/caregiver who verbalized understanding and gave written consent.

## 2022-05-12 NOTE — CHART NOTE - NSCHARTNOTESELECT_GEN_ALL_CORE
Chart Note/Nutrition Services
Event Note
Postoperative Check
Pre-IR note/Event Note
Event Note
IR NOTE/Event Note
Preoperative Note

## 2022-05-12 NOTE — PROGRESS NOTE ADULT - ASSESSMENT
67yo F with Hx HTN, HLD, DM, hypothyroidism, CKD who presented with SOB and worsening renal failure requiring initially of HD s/p LUE AVF 5/10.     PLAN:   - C/w HD per Nephrology  - Please recall Vascular Surgery with additional questions   - Please have patient f/u as OP with Dr. Culver after discharge     Steffi Culver)  Surgery; Vascular Surgery  [address], [address]  [place] [address]  Phone: [phone]  Fax: [phone]      Eliana Cuevas, PGY-2  C Team Surgery  #[id number]

## 2022-05-12 NOTE — PROGRESS NOTE ADULT - ASSESSMENT
66F PMH CKD 5 not on hd, HTN, HLD, DM2, hypothyroid recent hospital admission Mar 2022 with plans for starting outpt dialysis presenting with fluid overload, worsen renal function, need to initiate dialysis,    CKD stage 5 now on HD ---> ESRD on HD 5/3/2022  now with worsen renal function, mild uremic symptom and fluid overload  initiated HD 5/3   s/p shiley 5/3 with IR  permacath today by IR  AVF left upper arm created on 5/10/2022  HD today 5/12  will continue TTS schedule  s/p HD on 5/9  consent in chart.   - Monitor BMP   - Avoid nephrotoxics, NSAIDS RCA    Fluid overload  sec to renal failure  UF with HD as tolerated  f/u cardio    HTN   monitor, increase losartan 50 if persistently >150  on losartan 25 daily and Carvedilol 6.25 mg po every 12 hours.   monitor BP       anemia  iron sat 15%  will start iron with hd  epo with HD   monitor  transfuse to keep hb>7    hyponatremia  likely fluid overload  free water restriction <1L/day    Hyperphosphatemia  continue  binders  low PO4 diet  monitor    hypocalcemia  optimal PTH for ckd  improved  monitor    hypokalemia  Monitor  hd with high k bath    Hepatitis B  known  per daughter did not follow up with hepatology  +hep b PCR  consider hepatology eval      66F PMH CKD 5 not on hd, HTN, HLD, DM2, hypothyroid recent hospital admission Mar 2022 with plans for starting outpt dialysis presenting with fluid overload, worsen renal function, need to initiate dialysis,    CKD stage 5 now on HD ---> ESRD on HD 5/3/2022  now with worsen renal function, mild uremic symptom and fluid overload  initiated HD 5/3   s/p shiley 5/3 with IR  permacath today by IR  AVF left upper arm created on 5/10/2022  HD today 5/12  will continue TTS schedule  s/p HD on 5/9  consent in chart.   - Monitor BMP   - Avoid nephrotoxics, NSAIDS RCA    Fluid overload  sec to renal failure  UF with HD as tolerated  f/u cardio    HTN   monitor, increase losartan 50 if persistently >150  on losartan 25 daily and Carvedilol 6.25 mg po every 12 hours.   monitor BP       anemia  iron sat 15%  will start iron with hd  epo with HD   monitor  transfuse to keep hb>7    hyponatremia  likely fluid overload  free water restriction <1L/day    Hyperphosphatemia  continue  binders  low PO4 diet  monitor    hypocalcemia now corrected.   optimal PTH for ckd  improved  monitor    hypokalemia resolved  Monitor  hd with high k bath    Hepatitis B  known  per daughter did not follow up with hepatology  +hep b PCR  consider hepatology eval

## 2022-05-12 NOTE — PROGRESS NOTE ADULT - ASSESSMENT
Impression:  65 yo F w/ PMHx DM, HTN, HLD, CKD V admitted for HD initiation, hepatology consulted for positive HBV serologies    # HBV - patient w/ evidence of ongoing HBV, evidenced by positive HBVsAg and HBVcAb, HBV PCR quant 95. This likely represents chronic HBV (>6 weeks), however at this time cannot confirm that. In management will assess chronicity by evaluating HBeAb (if positive likely represents chronicity). Will assess for delta co-infection (unlikely as no IVDU). Will monitor for sequale of longstanding HBV and assess degree of liver damage (RUQ US, MR elastography). Patient has low viral load and mild elevation in ALT, which likely represents immune control stage (vs immune active). Additionally, patient has longstanding CKD now in ESRD w/ HD, which has been attributed to metabolic syndrome, however would want to r/o HBV glomerulonephritis. Additionally, will evaluate for other extra-hepatic manifestations of HBV (ex vasculitis)    Recommendations:  - trend liver enzymes  - check HBe antigen and antibody  - check hepatitis delta antibody  - RUQ US  - please obtain U/S elastography when patient euvolemic: please enter "Ultrasound elastography" under Orders and select the 3rd option "Ultrasound elastography parynchyma" - this is different from a normal RUQ or Abd U/S. Can be done as outpatient   - check serum immune complexes  - check serum complements (C3, C4, total complements)  - clarify with nephrology if patient ever had further workup for CKD including renal biopsy as outpatient  - check ESR/CRP  - patient will likely be initiated on antiviral (likely entecavir) as outpatient   - rest of care per primary team    Hepatology  will continue to follow.     All recommendations are tentative until note is attested by attending.     Jonatan Boone, PGY5  Gastroenterology/Hepatology Fellow  Available on [employer] Teams  [phone] ([employer] Short Range Pager)  [phone] (Long Range Pager)    After 5pm, please contact the on-call GI fellow. [phone]

## 2022-05-12 NOTE — PROGRESS NOTE ADULT - SUBJECTIVE AND OBJECTIVE BOX
Subjective:  Patient seen at bedside this AM. Reports feeling well, without complaints. Denies chest pain, SOB.      24h Events:   - IR Permacath yesterday    Objective:  Vital Signs  T(C): 37.1 (05-12 @ 05:30), Max: 37.4 (05-12 @ 02:57)  HR: 65 (05-12 @ 05:30) (64 - 78)  BP: 150/55 (05-12 @ 05:30) (112/78 - 170/69)  RR: 18 (05-12 @ 05:30) (17 - 18)  SpO2: 100% (05-12 @ 05:30) (98% - 100%)      Physical Exam:  GEN: resting in bed comfortably in NAD  NEURO: awake, alert  RESP: no increased WOB  EXTR: LUE warm, well-perfused, palpable radial + ulnar pulses, sensory + motor intact, palpable thrill over AVF    Labs:             9.2    9.12  )-----------( 219      ( 11 May 2022 06:51 )             29.1     134<L>  |  100  |  45<H>  ----------------------------<  155<H>  4.2   |  23  |  3.71<H>    Ca    8.5      11 May 2022 06:51  Phos  4.7     05-11  Mg     2.40     05-11    POCT Blood Glucose.: 167 mg/dL (11 May 2022 21:52)  POCT Blood Glucose.: 195 mg/dL (11 May 2022 17:57)  POCT Blood Glucose.: 115 mg/dL (11 May 2022 12:44)  POCT Blood Glucose.: 162 mg/dL (11 May 2022 09:06)      Medications:   MEDICATIONS  (STANDING):  aspirin enteric coated 81 milliGRAM(s) Oral daily  atorvastatin 20 milliGRAM(s) Oral at bedtime  calcium acetate 1334 milliGRAM(s) Oral three times a day with meals  carvedilol 6.25 milliGRAM(s) Oral every 12 hours  chlorhexidine 2% Cloths 1 Application(s) Topical daily  cyanocobalamin 1000 MICROGram(s) Oral daily  dextrose 5%. 1000 milliLiter(s) (50 mL/Hr) IV Continuous <Continuous>  dextrose 5%. 1000 milliLiter(s) (100 mL/Hr) IV Continuous <Continuous>  dextrose 50% Injectable 25 Gram(s) IV Push once  dextrose 50% Injectable 12.5 Gram(s) IV Push once  dextrose 50% Injectable 25 Gram(s) IV Push once  epoetin denise-epbx (RETACRIT) Injectable 75078 Unit(s) IV Push <User Schedule>  folic acid 1 milliGRAM(s) Oral daily  gabapentin 300 milliGRAM(s) Oral daily  glucagon  Injectable 1 milliGRAM(s) IntraMuscular once  hydrALAZINE 25 milliGRAM(s) Oral three times a day  insulin glargine Injectable (LANTUS) 10 Unit(s) SubCutaneous at bedtime  insulin lispro (ADMELOG) corrective regimen sliding scale   SubCutaneous at bedtime  insulin lispro (ADMELOG) corrective regimen sliding scale   SubCutaneous three times a day before meals  iron sucrose Injectable 100 milliGRAM(s) IV Push <User Schedule>  levothyroxine 75 MICROGram(s) Oral daily  loratadine 10 milliGRAM(s) Oral daily  losartan 25 milliGRAM(s) Oral daily  pantoprazole    Tablet 40 milliGRAM(s) Oral before breakfast  polyethylene glycol 3350 17 Gram(s) Oral daily  senna 2 Tablet(s) Oral at bedtime    MEDICATIONS  (PRN):  acetaminophen     Tablet .. 650 milliGRAM(s) Oral every 6 hours PRN Mild Pain (1 - 3), Moderate Pain (4 - 6), Severe Pain (7 - 10)  dextrose Oral Gel 15 Gram(s) Oral once PRN Blood Glucose LESS THAN 70 milliGRAM(s)/deciliter  sodium chloride 0.9% lock flush 10 milliLiter(s) IV Push every 1 hour PRN Pre/post blood products, medications, blood draw, and to maintain line patency

## 2022-05-12 NOTE — CHART NOTE - NSCHARTNOTEFT_GEN_A_CORE
Alerted by RN, Pt with hypotension 96/43mmhg, asymptomatic without headache, dizziness, lightheadedness. UF cancelled for today and renal discontinued Nifedipine. Discussed with Dr. Granados, medications reviewed, recommend to c/w hydralazine and coreg with hold parameters for now, will continue to monitor and reassess.
Interventional Radiology Pre-Procedure Checklist     Patient Age: 66 years     Patient Gender: Female     Procedure (including site / side if known): permacath     Diagnosis / Indication: New HD     Interventional Radiology Attending Physician: Dr. Eddy     Ordering Attending Physician: Dr. Boggs     Pertinent medical history: 65 yo female with ho HTN, DM, HLD, CKD, hypothyroidism presents with progressive SOB and started on HD.     Pertinent labs:                        9.2    9.12  )-----------( 219      ( 11 May 2022 06:51 )             29.1   05-11    134<L>  |  100  |  45<H>  ----------------------------<  155<H>  4.2   |  23  |  3.71<H>    Ca    8.5      11 May 2022 06:51  Phos  4.7     05-11  Mg     2.40     05-11        Patient and Family aware? Yes
PREOPERATIVE SURGERY NOTE     DATE OF SURGERY : 5/10/2022  SURGERY: LEFT ARTERIOVENOUS FISTULA     Vital Signs   T(C): 36.5 (09 May 2022 13:45), Max: 37.2 (08 May 2022 22:30)  T(F): 97.7 (09 May 2022 13:45), Max: 99 (08 May 2022 22:30)  HR: 74 (09 May 2022 13:45) (59 - 74)  BP: 171/81 (09 May 2022 13:45) (139/64 - 190/88)  RR: 18 (09 May 2022 13:45) (17 - 18)  SpO2: 98% (09 May 2022 13:45) (97% - 99%)    PHYSICAL EXAM   General:  AAOx3 in NAD  Chest:  Symmetrical chest rise bilaterally, breathing comfortably on RA   Abdomen:  Soft, nondistended, nontender to palpation in all four quadrants   Extremities: LUE with palpable radial + ulnar pulses, sensory + motor intact    MEDICATIONS:   GI PROPHYLAXIS: pantoprazole    Tablet 40 milliGRAM(s)    LAB/STUDIES:             8.0    6.23  )-----------( 156      ( 09 May 2022 07:19 )             25.8     137  |  103  |  46<H>  ----------------------------<  115<H>  4.0   |  22  |  3.15<H>  Ca    8.2<L>      09 May 2022 07:19  Phos  4.3     05-09  Mg     2.10     05-09    MARC is planned for a Left AV fistula creation tomorrow   [ X ] Croatian Consent   [ X ] COVID-19   [    ] 4 AM LABS   [ X ] HD Complete Today
65 y/o female with ESRD on new HD currently with a shiroderick. Patient in need of permacath for discharge. IR consulted for permacath placement. IR has requested for permacath to be placed with AV fistula to avoid multiple episodes of sedation given patient's history. Vascular notified of request, unable to accommodate. IR made aware. Attending Dr. Granados made aware.
A routine bedside evaluation was performed of MARC four hours after her Left-AVF. She states she is having a little pain. No nausea or vomiting.     Vital Signs   T(C): 36.6 (10 May 2022 14:37), Max: 36.8 (09 May 2022 22:00)  T(F): 97.9 (10 May 2022 14:37), Max: 98.3 (09 May 2022 22:00)  HR: 57 (10 May 2022 17:00) (54 - 69)  BP: 176/65 (10 May 2022 17:00) (117/61 - 183/66)  BP(mean): 88 (10 May 2022 17:00) (83 - 124)  RR: 13 (10 May 2022 17:00) (13 - 18)  SpO2: 99% (10 May 2022 17:00) (95% - 100%)    PHYSICAL EXAMINATION  GENERAL: AAOx3 in NAD. Lying comfortably in bed  CHEST/LUNG: Breathing comfortably on RA with symmetrical chest wall expansion  ABDOMEN: Soft, nondistended, nontender to palpation in all four quadrants  EXTREMITIES:  Thrill palpable    LABS:             8.2    7.05  )-----------( 160      ( 10 May 2022 04:16 )             25.4   133<L>  |  96<L>  |  32<H>  ----------------------------<  142<H>  3.6   |  26  |  2.73<H>  Ca    8.1<L>      10 May 2022 04:16  Phos  3.6     05-10  Mg     2.10     05-10  PT/INR - ( 10 May 2022 04:16 )   PT: 12.8 sec;   INR: 1.10 ratio    PTT - ( 10 May 2022 04:16 )  PTT:26.7 sec    MARC is status post Creation of Left-AVF   - GI ppx: PRTNX   - ASA81  - LR @ 75 mL/Hr   - ISS   - Pain: Tylenol, PRN Dilaudid   - PO Meds   - HD MWF  - Temporary HD Access: [facility]  - Permanent HD Access (Future): Permacath
IR requesting that permacath be done at same time as AVF today (with vascular); however as per vascular, no plan for permacath placement today during AVF procedure. As per vascular, will need to f/u with IR regarding timing for permacath placement. Requested that vascular team contacts IR directly to discuss permacath placement. Nephrology made aware.
PRE-INTERVENTIONAL RADIOLOGY PROCEDURE NOTE    Patient Age: 66y  Patient Gender: Female    Procedure: temp HD access    Diagnosis / Indication: ESRD new HD    Interventional Radiology Attending Physician: Dr. Eddy  Ordering Attending Physician: Dr. Edwards    Pertinent medical history:     Pertinent labs:                       7.3    8.44  )-----------( 202      ( 03 May 2022 12:35 )             23.2       05-03    130<L>  |  90<L>  |  113<H>  ----------------------------<  168<H>  4.0   |  22  |  5.00<H>    Ca    8.1<L>      03 May 2022 11:57  Phos  7.6     05-03  Mg     4.70     05-03    TPro  7.9  /  Alb  4.3  /  TBili  0.6  /  DBili  x   /  AST  46<H>  /  ALT  46<H>  /  AlkPhos  131<H>  05-03          Patient and Family aware? Yes      Vishal Shah PA-C   Contact #: pgr [phone]
RD reviewed patient chart for length of stay nutrition assessment.  At time of visit, patient was out of room, reported to have went for AVF placement.  To reattempt visiting patient at a later time/date as patient newly started on HD on this admission; diet education materials prepared.
Received call from lab that pt has >100,000 Ecoli ESBL in urine culture from March 9th 2022, currently no white count, no fever, pt no longer making urine, discussed with Dr. Quintanilla, no intervention at this time.

## 2022-05-12 NOTE — PRE PROCEDURE NOTE - PRE PROCEDURE EVALUATION
Interventional Radiology Pre-Procedure Note    Diagnosis/Indication: Patient is a 66y old  Female who presents with a chief complaint of progressive SOB. Patient requiring long term HD. Plan for conversion of existing nontunneled to new right chest wall tunneled HD catheter.       PAST MEDICAL & SURGICAL HISTORY:  HTN (hypertension)      HLD (hyperlipidemia)           Allergies: No Known Allergies      LABS:                        8.6    7.12  )-----------( 187      ( 12 May 2022 07:15 )             27.5     05-12    133<L>  |  96<L>  |  40<H>  ----------------------------<  106<H>  3.9   |  24  |  3.29<H>    Ca    8.4      12 May 2022 07:15  Phos  4.3     05-12  Mg     2.20     05-12      PT/INR - ( 12 May 2022 07:15 )   PT: 12.9 sec;   INR: 1.11 ratio         PTT - ( 12 May 2022 07:15 )  PTT:27.9 sec    Procedure/ risks/ benefits were explained, informed consent obtained from patient, verbalizes understanding.  services used.

## 2022-05-12 NOTE — PROGRESS NOTE ADULT - SUBJECTIVE AND OBJECTIVE BOX
Gary Morrison MD  Interventional Cardiology / Endovascular Specialist  [place] Office : [address] [place] [address] [place] [address]  Tel:   [place] Office : [address] [facility] [place] [address]  Tel: [phone]    Subjective/Overnight events: Patient lying in bed comfortably. No acute distress.   	  MEDICATIONS:  aspirin enteric coated 81 milliGRAM(s) Oral daily  carvedilol 6.25 milliGRAM(s) Oral every 12 hours  hydrALAZINE 25 milliGRAM(s) Oral three times a day  losartan 25 milliGRAM(s) Oral daily      loratadine 10 milliGRAM(s) Oral daily    acetaminophen     Tablet .. 650 milliGRAM(s) Oral every 6 hours PRN  gabapentin 300 milliGRAM(s) Oral daily    pantoprazole    Tablet 40 milliGRAM(s) Oral before breakfast  polyethylene glycol 3350 17 Gram(s) Oral daily  senna 2 Tablet(s) Oral at bedtime    atorvastatin 20 milliGRAM(s) Oral at bedtime  dextrose 50% Injectable 25 Gram(s) IV Push once  dextrose 50% Injectable 12.5 Gram(s) IV Push once  dextrose 50% Injectable 25 Gram(s) IV Push once  dextrose Oral Gel 15 Gram(s) Oral once PRN  glucagon  Injectable 1 milliGRAM(s) IntraMuscular once  insulin glargine Injectable (LANTUS) 10 Unit(s) SubCutaneous at bedtime  insulin lispro (ADMELOG) corrective regimen sliding scale   SubCutaneous at bedtime  insulin lispro (ADMELOG) corrective regimen sliding scale   SubCutaneous three times a day before meals  levothyroxine 75 MICROGram(s) Oral daily    calcium acetate 1334 milliGRAM(s) Oral three times a day with meals  chlorhexidine 2% Cloths 1 Application(s) Topical daily  chlorhexidine 4% Liquid 1 Application(s) Topical <User Schedule>  cyanocobalamin 1000 MICROGram(s) Oral daily  dextrose 5%. 1000 milliLiter(s) IV Continuous <Continuous>  dextrose 5%. 1000 milliLiter(s) IV Continuous <Continuous>  epoetin denise-epbx (RETACRIT) Injectable 96719 Unit(s) IV Push <User Schedule>  folic acid 1 milliGRAM(s) Oral daily  iron sucrose Injectable 100 milliGRAM(s) IV Push <User Schedule>  sodium chloride 0.9% lock flush 10 milliLiter(s) IV Push every 1 hour PRN      PAST MEDICAL/SURGICAL HISTORY  PAST MEDICAL & SURGICAL HISTORY:  HTN (hypertension)      HLD (hyperlipidemia)          SOCIAL HISTORY: Substance Use (street drugs): ( x ) never used  (  ) other:    FAMILY HISTORY:      REVIEW OF SYSTEMS:  CONSTITUTIONAL: No fever, weight loss, or fatigue  EYES: No eye pain, visual disturbances, or discharge  ENMT:  No difficulty hearing, tinnitus, vertigo; No sinus or throat pain  BREASTS: No pain, masses, or nipple discharge  GASTROINTESTINAL: No abdominal or epigastric pain. No nausea, vomiting, or hematemesis; No diarrhea or constipation. No melena or hematochezia.  GENITOURINARY: No dysuria, frequency, hematuria, or incontinence  NEUROLOGICAL: No headaches, memory loss, loss of strength, numbness, or tremors  ENDOCRINE: No heat or cold intolerance; No hair loss  MUSCULOSKELETAL: No joint pain or swelling; No muscle, back, or extremity pain  PSYCHIATRIC: No depression, anxiety, mood swings, or difficulty sleeping  HEME/LYMPH: No easy bruising, or bleeding gums  All others negative    PHYSICAL EXAM:  T(C): 37.1 (05-12-22 @ 05:30), Max: 37.4 (05-12-22 @ 02:57)  HR: 65 (05-12-22 @ 05:30) (64 - 78)  BP: 150/55 (05-12-22 @ 05:30) (112/78 - 151/62)  RR: 18 (05-12-22 @ 05:30) (17 - 18)  SpO2: 100% (05-12-22 @ 05:30) (98% - 100%)  Wt(kg): --  I&O's Summary    11 May 2022 07:01  -  12 May 2022 07:00  --------------------------------------------------------  IN: 400 mL / OUT: 1900 mL / NET: -1500 mL        GENERAL: NAD  EYES: EOMI, PERRLA, conjunctiva and sclera clear  ENMT: No tonsillar erythema, exudates, or enlargement  Cardiovascular: Normal S1 S2, No JVD, No murmurs, No edema  Respiratory: Lungs clear to auscultation	  Gastrointestinal:  Soft, Non-tender, + BS	  Extremities: No edema                              8.6    7.12  )-----------( 187      ( 12 May 2022 07:15 )             27.5     05-12    133<L>  |  96<L>  |  40<H>  ----------------------------<  106<H>  3.9   |  24  |  3.29<H>    Ca    8.4      12 May 2022 07:15  Phos  4.3     05-12  Mg     2.20     05-12      proBNP:   Lipid Profile:   HgA1c:   TSH:     Consultant(s) Notes Reviewed:  [x ] YES  [ ] NO    Care Discussed with Consultants/Other Providers [ x] YES  [ ] NO    Imaging Personally Reviewed independently:  [x] YES  [ ] NO    All labs, radiologic studies, vitals, orders and medications list reviewed. Patient is seen and examined at bedside. Case discussed with medical team.

## 2022-05-12 NOTE — PROGRESS NOTE ADULT - ASSESSMENT
66F PMH CKD, HTN, HLD, DM2, hypothyroid recent hospital admission Mar 2022 with plans for starting outpt dialysis presenting with 3 days of dyspnea at rest, associated with L shoulder pain, nausea, periorbital edema, b/l LE swelling, and abdominal bloating.    Tele: NSR    1. SOB  -likely 2/2 CKD   -trops elevated but flat 2/2 renal disease  -echo normal 3/2022 LV/RV , sever Phtn. stress 3/2022 with no ischemia   -improved on HD     2.  CKD  - on HD  - f/u renal recs    3. HTN  -BPcontrolled  -on hydral 10mg TID  -continue to monitor BP    4. Bradycardia   - asymptomatic monitor on tele   - hypokalemia s/p repletion     5. Post-op assessment  -denies CP, SOB or palpitations  -recent stress 3/2022 no evidence of infarct or ischemia  -s/p AVF doing well     DVT prophylaxis

## 2022-05-12 NOTE — PROCEDURE NOTE - PROCEDURE FINDINGS AND DETAILS
Catheter tip in SVC. Converted to right chest tunneled catheter. tip in SVC. Catheter sutured to skin.

## 2022-05-12 NOTE — PROGRESS NOTE ADULT - SUBJECTIVE AND OBJECTIVE BOX
[facility]   MD ANA KAPADIA MD KRISTINE SOLTANPOUR, DO ANGELA WONG, PA    TEL:  OFFICE: [phone]  From 5pm-7am Answering Service [phone]    -- RENAL FOLLOW UP NOTE ---Date of Service 05-12-22 @ 14:37    Patient is a 66y old  Female who presents with a chief complaint of progressive SOB (12 May 2022 14:10)      Patient seen and examined at bedside. No chest pain/sob    VITALS:  T(F): 98.7 (05-12-22 @ 05:30), Max: 99.4 (05-12-22 @ 02:57)  HR: 65 (05-12-22 @ 05:30)  BP: 150/55 (05-12-22 @ 05:30)  RR: 18 (05-12-22 @ 05:30)  SpO2: 100% (05-12-22 @ 05:30)  Wt(kg): --    05-11 @ 07:01  -  05-12 @ 07:00  --------------------------------------------------------  IN: 400 mL / OUT: 1900 mL / NET: -1500 mL          PHYSICAL EXAM:  Constitutional: NAD  Neck: No JVD  Respiratory: CTAB, no wheezes, rales or rhonchi  Cardiovascular: S1, S2, RRR  Gastrointestinal: BS+, soft, NT/ND  Extremities: No peripheral edema    Hospital Medications:   MEDICATIONS  (STANDING):  aspirin enteric coated 81 milliGRAM(s) Oral daily  atorvastatin 20 milliGRAM(s) Oral at bedtime  calcium acetate 1334 milliGRAM(s) Oral three times a day with meals  carvedilol 6.25 milliGRAM(s) Oral every 12 hours  chlorhexidine 2% Cloths 1 Application(s) Topical daily  chlorhexidine 4% Liquid 1 Application(s) Topical <User Schedule>  cyanocobalamin 1000 MICROGram(s) Oral daily  dextrose 5%. 1000 milliLiter(s) (50 mL/Hr) IV Continuous <Continuous>  dextrose 5%. 1000 milliLiter(s) (100 mL/Hr) IV Continuous <Continuous>  dextrose 50% Injectable 25 Gram(s) IV Push once  dextrose 50% Injectable 12.5 Gram(s) IV Push once  dextrose 50% Injectable 25 Gram(s) IV Push once  epoetin denise-epbx (RETACRIT) Injectable 45941 Unit(s) IV Push <User Schedule>  folic acid 1 milliGRAM(s) Oral daily  gabapentin 300 milliGRAM(s) Oral daily  glucagon  Injectable 1 milliGRAM(s) IntraMuscular once  hydrALAZINE 25 milliGRAM(s) Oral three times a day  insulin glargine Injectable (LANTUS) 10 Unit(s) SubCutaneous at bedtime  insulin lispro (ADMELOG) corrective regimen sliding scale   SubCutaneous three times a day before meals  insulin lispro (ADMELOG) corrective regimen sliding scale   SubCutaneous at bedtime  iron sucrose Injectable 100 milliGRAM(s) IV Push <User Schedule>  levothyroxine 75 MICROGram(s) Oral daily  loratadine 10 milliGRAM(s) Oral daily  losartan 25 milliGRAM(s) Oral daily  pantoprazole    Tablet 40 milliGRAM(s) Oral before breakfast  polyethylene glycol 3350 17 Gram(s) Oral daily  senna 2 Tablet(s) Oral at bedtime      LABS:  05-12    133<L>  |  96<L>  |  40<H>  ----------------------------<  106<H>  3.9   |  24  |  3.29<H>    Ca    8.4      12 May 2022 07:15  Phos  4.3     05-12  Mg     2.20     05-12      Creatinine Trend: 3.29 <--, 3.71 <--, 2.73 <--, 3.15 <--, 2.71 <--, 3.17 <--, 3.02 <--    Phosphorus Level, Serum: 4.3 mg/dL (05-12 @ 07:15)                              8.6    7.12  )-----------( 187      ( 12 May 2022 07:15 )             27.5     Urine Studies:      Iron 37, TIBC 252, %sat 15      [05-07-22 @ 07:00]  Ferritin 83      [05-07-22 @ 07:00]  PTH -- (Ca --)      [05-07-22 @ 06:59]   204  TSH 1.55      [05-04-22 @ 06:42]    HBsAb <3.0      [05-04-22 @ 05:51]  HBsAg Reactive      [05-04-22 @ 05:02]  HBcAb Reactive      [05-04-22 @ 05:51]  HCV 0.13, Nonreact      [05-04-22 @ 05:51]    C3 Complement 116      [05-12-22 @ 07:15]  C4 Complement 56      [05-12-22 @ 07:15]    RADIOLOGY & ADDITIONAL STUDIES:

## 2022-05-12 NOTE — PROGRESS NOTE ADULT - ASSESSMENT
65 yo female with ho HTN, DM, HLD, CKD, hypothyroidism present with progressive SOB over last 3 days with LE edema, abd bloating and periorbital edema c/w ESRD with fluid overload.  Pt to start HD this admission.    Problem/Plan - 1:  ·  Problem: ESRD on hemodialysis.   ·  Plan: pt with advanced CKD now req HD  IR placed non tunneled catheter 5/3/22 for HD   vasc consulted for AVF, placed LUE 5/10/22  HD per nephrology  pt rec'd Lasix 40 mg in ED  cont Ca acetate, oral bicarb for now  Cardiac clearance appreciated  IR-guided conversion to tunneled catheter 5/12/22    Problem/Plan - 2:  ·  Problem: HTN (hypertension).   ·  Plan: cont coreg, nifedipine, hydralazine with hold parameters  cont ASA  elevated trop with no delta, likely due to renal dz, pt without CP.    Problem/Plan - 3:  ·  Problem: Diabetes mellitus, type 2.   ·  Plan: pt on lantus, 70/30, trulicity at home  will cont lantus here with sliding scale for now  adjust as needed  a1c = 5.6    Problem/Plan - 4:  ·  Problem: Hypothyroidism.   ·  Plan: cont synthroid  TSH = 1.55.    Problem/Plan - 5:  ·  Problem: Anemia of chronic disease.   ·  Plan: likely multifactorial  B12 def, ESRD.    Problem/Plan - 6:  ·  Problem: Hepatitis B  Hepatology following  HBeAg/ab pending   please order U/S elastography and complements per hepatology team   (can be done outpt if not done here)  will need follow up outpt    DVT ppx

## 2022-05-12 NOTE — PROGRESS NOTE ADULT - SUBJECTIVE AND OBJECTIVE BOX
Gastroenterology/Hepatology Progress Note      Interval Events:   - no acute events overnight    Allergies:  No Known Allergies      Hospital Medications:  acetaminophen     Tablet .. 650 milliGRAM(s) Oral every 6 hours PRN  aspirin enteric coated 81 milliGRAM(s) Oral daily  atorvastatin 20 milliGRAM(s) Oral at bedtime  calcium acetate 1334 milliGRAM(s) Oral three times a day with meals  carvedilol 6.25 milliGRAM(s) Oral every 12 hours  chlorhexidine 2% Cloths 1 Application(s) Topical daily  cyanocobalamin 1000 MICROGram(s) Oral daily  dextrose 5%. 1000 milliLiter(s) IV Continuous <Continuous>  dextrose 5%. 1000 milliLiter(s) IV Continuous <Continuous>  dextrose 50% Injectable 25 Gram(s) IV Push once  dextrose 50% Injectable 12.5 Gram(s) IV Push once  dextrose 50% Injectable 25 Gram(s) IV Push once  dextrose Oral Gel 15 Gram(s) Oral once PRN  epoetin denise-epbx (RETACRIT) Injectable 14125 Unit(s) IV Push <User Schedule>  folic acid 1 milliGRAM(s) Oral daily  gabapentin 300 milliGRAM(s) Oral daily  glucagon  Injectable 1 milliGRAM(s) IntraMuscular once  hydrALAZINE 25 milliGRAM(s) Oral three times a day  insulin glargine Injectable (LANTUS) 10 Unit(s) SubCutaneous at bedtime  insulin lispro (ADMELOG) corrective regimen sliding scale   SubCutaneous at bedtime  insulin lispro (ADMELOG) corrective regimen sliding scale   SubCutaneous three times a day before meals  iron sucrose Injectable 100 milliGRAM(s) IV Push <User Schedule>  levothyroxine 75 MICROGram(s) Oral daily  loratadine 10 milliGRAM(s) Oral daily  losartan 25 milliGRAM(s) Oral daily  pantoprazole    Tablet 40 milliGRAM(s) Oral before breakfast  polyethylene glycol 3350 17 Gram(s) Oral daily  senna 2 Tablet(s) Oral at bedtime  sodium chloride 0.9% lock flush 10 milliLiter(s) IV Push every 1 hour PRN    PHYSICAL EXAM:   Vital Signs:  Vital Signs Last 24 Hrs  T(C): 37.1 (12 May 2022 05:30), Max: 37.4 (12 May 2022 02:57)  T(F): 98.7 (12 May 2022 05:30), Max: 99.4 (12 May 2022 02:57)  HR: 65 (12 May 2022 05:30) (64 - 78)  BP: 150/55 (12 May 2022 05:30) (112/78 - 170/69)  BP(mean): --  RR: 18 (12 May 2022 05:30) (17 - 18)  SpO2: 100% (12 May 2022 05:30) (98% - 100%)  Daily     Daily Weight in k.7 (11 May 2022 14:40)    GENERAL:  No acute distress  HEENT:  NCAT, no scleral icterus  CHEST: no resp distress  HEART:  RRR  ABDOMEN:  Soft, non-tender, non-distended, normoactive bowel sounds, no masses  EXTREMITIES:  No cyanosis, clubbing, or edema  SKIN:  No rash/erythema/ecchymoses/petechiae/wounds/abscess/warm/dry  NEURO:  Alert and oriented x 3, no asterixis, no tremor    LABS:                        8.6    7.12  )-----------( 187      ( 12 May 2022 07:15 )             27.5     Mean Cell Volume: 89.0 fL (- @ 07:15)    05-12    133<L>  |  96<L>  |  40<H>  ----------------------------<  106<H>  3.9   |  24  |  3.29<H>    Ca    8.4      12 May 2022 07:15  Phos  4.3       Mg     2.20     12        PT/INR - ( 12 May 2022 07:15 )   PT: 12.9 sec;   INR: 1.11 ratio         PTT - ( 12 May 2022 07:15 )  PTT:27.9 sec          Imaging:

## 2022-05-12 NOTE — PROGRESS NOTE ADULT - SUBJECTIVE AND OBJECTIVE BOX
SUBJECTIVE / OVERNIGHT EVENTS:  s/p ROYER dyson  doing well post procedure  ambulating around  no cp, no sob, no n/v/d. no abdominal pain.  no headache, no dizziness.   no dysuria, no urinary urgency/frequency. no bowel/bladder incontinence.         --------------------------------------------------------------------------------------------  LABS:                        8.6    7.12  )-----------( 187      ( 12 May 2022 07:15 )             27.5     05-12    133<L>  |  96<L>  |  40<H>  ----------------------------<  106<H>  3.9   |  24  |  3.29<H>    Ca    8.4      12 May 2022 07:15  Phos  4.3     05-12  Mg     2.20     05-12      PT/INR - ( 12 May 2022 07:15 )   PT: 12.9 sec;   INR: 1.11 ratio         PTT - ( 12 May 2022 07:15 )  PTT:27.9 sec  CAPILLARY BLOOD GLUCOSE      POCT Blood Glucose.: 302 mg/dL (12 May 2022 22:09)  POCT Blood Glucose.: 159 mg/dL (12 May 2022 18:00)  POCT Blood Glucose.: 179 mg/dL (12 May 2022 12:07)  POCT Blood Glucose.: 140 mg/dL (12 May 2022 07:40)            RADIOLOGY & ADDITIONAL TESTS:    Imaging Personally Reviewed:  [x] YES  [ ] NO    Consultant(s) Notes Reviewed:  [x] YES  [ ] NO    MEDICATIONS  (STANDING):  aspirin enteric coated 81 milliGRAM(s) Oral daily  atorvastatin 20 milliGRAM(s) Oral at bedtime  calcium acetate 1334 milliGRAM(s) Oral three times a day with meals  carvedilol 6.25 milliGRAM(s) Oral every 12 hours  chlorhexidine 2% Cloths 1 Application(s) Topical daily  chlorhexidine 4% Liquid 1 Application(s) Topical <User Schedule>  cyanocobalamin 1000 MICROGram(s) Oral daily  dextrose 5%. 1000 milliLiter(s) (50 mL/Hr) IV Continuous <Continuous>  dextrose 5%. 1000 milliLiter(s) (100 mL/Hr) IV Continuous <Continuous>  dextrose 50% Injectable 25 Gram(s) IV Push once  dextrose 50% Injectable 12.5 Gram(s) IV Push once  dextrose 50% Injectable 25 Gram(s) IV Push once  epoetin denise-epbx (RETACRIT) Injectable 91516 Unit(s) IV Push <User Schedule>  folic acid 1 milliGRAM(s) Oral daily  gabapentin 300 milliGRAM(s) Oral daily  glucagon  Injectable 1 milliGRAM(s) IntraMuscular once  hydrALAZINE 25 milliGRAM(s) Oral three times a day  insulin glargine Injectable (LANTUS) 10 Unit(s) SubCutaneous at bedtime  insulin lispro (ADMELOG) corrective regimen sliding scale   SubCutaneous three times a day before meals  insulin lispro (ADMELOG) corrective regimen sliding scale   SubCutaneous at bedtime  iron sucrose Injectable 100 milliGRAM(s) IV Push <User Schedule>  levothyroxine 75 MICROGram(s) Oral daily  loratadine 10 milliGRAM(s) Oral daily  losartan 25 milliGRAM(s) Oral daily  pantoprazole    Tablet 40 milliGRAM(s) Oral before breakfast  polyethylene glycol 3350 17 Gram(s) Oral daily  senna 2 Tablet(s) Oral at bedtime    MEDICATIONS  (PRN):  acetaminophen     Tablet .. 650 milliGRAM(s) Oral every 6 hours PRN Mild Pain (1 - 3), Moderate Pain (4 - 6), Severe Pain (7 - 10)  dextrose Oral Gel 15 Gram(s) Oral once PRN Blood Glucose LESS THAN 70 milliGRAM(s)/deciliter  sodium chloride 0.9% lock flush 10 milliLiter(s) IV Push every 1 hour PRN Pre/post blood products, medications, blood draw, and to maintain line patency      Care Discussed with Consultants/Other Providers [x] YES  [ ] NO    Vital Signs Last 24 Hrs  T(C): 37.1 (12 May 2022 22:25), Max: 37.4 (12 May 2022 02:57)  T(F): 98.8 (12 May 2022 22:25), Max: 99.4 (12 May 2022 02:57)  HR: 67 (12 May 2022 22:25) (62 - 68)  BP: 120/51 (12 May 2022 22:25) (120/51 - 150/55)  BP(mean): --  RR: 18 (12 May 2022 22:25) (18 - 18)  SpO2: 100% (12 May 2022 22:25) (99% - 100%)  I&O's Summary    11 May 2022 07:01  -  12 May 2022 07:00  --------------------------------------------------------  IN: 400 mL / OUT: 1900 mL / NET: -1500 mL    12 May 2022 07:01  -  13 May 2022 02:26  --------------------------------------------------------  IN: 400 mL / OUT: 1900 mL / NET: -1500 mL          PHYSICAL EXAM:  GENERAL: NAD, COMFORTABLE  HEENT: NCAT, EOMI  NECK: Supple, No JVD  CHEST/LUNG: mild decrease breath sounds bilaterally; No wheeze, + Shiley cath --> Permcath interchanged.   HEART: Regular rate and rhythm; nl S1/S2; No murmurs, rubs, or gallops  ABDOMEN: Soft, Nontender, Nondistended; Bowel sounds present; No hepatosplenomegaly  EXTREMITIES:  2+ Peripheral Pulses; residual b/l LE edema   SKIN: no rash.

## 2022-05-13 LAB
ANION GAP SERPL CALC-SCNC: 11 MMOL/L — SIGNIFICANT CHANGE UP (ref 7–14)
BUN SERPL-MCNC: 25 MG/DL — HIGH (ref 7–23)
CALCIUM SERPL-MCNC: 8.4 MG/DL — SIGNIFICANT CHANGE UP (ref 8.4–10.5)
CHLORIDE SERPL-SCNC: 99 MMOL/L — SIGNIFICANT CHANGE UP (ref 98–107)
CO2 SERPL-SCNC: 26 MMOL/L — SIGNIFICANT CHANGE UP (ref 22–31)
CREAT SERPL-MCNC: 2.69 MG/DL — HIGH (ref 0.5–1.3)
EGFR: 19 ML/MIN/1.73M2 — LOW
GLUCOSE SERPL-MCNC: 128 MG/DL — HIGH (ref 70–99)
HBV E AB SER-ACNC: REACTIVE
HBV E AG SER-ACNC: SIGNIFICANT CHANGE UP
HCT VFR BLD CALC: 26.7 % — LOW (ref 34.5–45)
HGB BLD-MCNC: 8.5 G/DL — LOW (ref 11.5–15.5)
MAGNESIUM SERPL-MCNC: 2.1 MG/DL — SIGNIFICANT CHANGE UP (ref 1.6–2.6)
MCHC RBC-ENTMCNC: 28.3 PG — SIGNIFICANT CHANGE UP (ref 27–34)
MCHC RBC-ENTMCNC: 31.8 GM/DL — LOW (ref 32–36)
MCV RBC AUTO: 89 FL — SIGNIFICANT CHANGE UP (ref 80–100)
NRBC # BLD: 0 /100 WBCS — SIGNIFICANT CHANGE UP
NRBC # FLD: 0 K/UL — SIGNIFICANT CHANGE UP
PHOSPHATE SERPL-MCNC: 3.4 MG/DL — SIGNIFICANT CHANGE UP (ref 2.5–4.5)
PLATELET # BLD AUTO: 186 K/UL — SIGNIFICANT CHANGE UP (ref 150–400)
POTASSIUM SERPL-MCNC: 4.2 MMOL/L — SIGNIFICANT CHANGE UP (ref 3.5–5.3)
POTASSIUM SERPL-SCNC: 4.2 MMOL/L — SIGNIFICANT CHANGE UP (ref 3.5–5.3)
RBC # BLD: 3 M/UL — LOW (ref 3.8–5.2)
RBC # FLD: 13.7 % — SIGNIFICANT CHANGE UP (ref 10.3–14.5)
SARS-COV-2 RNA SPEC QL NAA+PROBE: SIGNIFICANT CHANGE UP
SODIUM SERPL-SCNC: 136 MMOL/L — SIGNIFICANT CHANGE UP (ref 135–145)
WBC # BLD: 7.17 K/UL — SIGNIFICANT CHANGE UP (ref 3.8–10.5)
WBC # FLD AUTO: 7.17 K/UL — SIGNIFICANT CHANGE UP (ref 3.8–10.5)

## 2022-05-13 PROCEDURE — 99232 SBSQ HOSP IP/OBS MODERATE 35: CPT | Mod: GC

## 2022-05-13 RX ORDER — HEPARIN SODIUM 5000 [USP'U]/ML
5000 INJECTION INTRAVENOUS; SUBCUTANEOUS EVERY 12 HOURS
Refills: 0 | Status: DISCONTINUED | OUTPATIENT
Start: 2022-05-14 | End: 2022-05-14

## 2022-05-13 RX ORDER — CHLORHEXIDINE GLUCONATE 213 G/1000ML
1 SOLUTION TOPICAL DAILY
Refills: 0 | Status: DISCONTINUED | OUTPATIENT
Start: 2022-05-13 | End: 2022-05-14

## 2022-05-13 RX ADMIN — Medication 1334 MILLIGRAM(S): at 13:05

## 2022-05-13 RX ADMIN — Medication 81 MILLIGRAM(S): at 13:06

## 2022-05-13 RX ADMIN — PANTOPRAZOLE SODIUM 40 MILLIGRAM(S): 20 TABLET, DELAYED RELEASE ORAL at 06:10

## 2022-05-13 RX ADMIN — POLYETHYLENE GLYCOL 3350 17 GRAM(S): 17 POWDER, FOR SOLUTION ORAL at 13:07

## 2022-05-13 RX ADMIN — INSULIN GLARGINE 10 UNIT(S): 100 INJECTION, SOLUTION SUBCUTANEOUS at 21:49

## 2022-05-13 RX ADMIN — Medication 1: at 17:49

## 2022-05-13 RX ADMIN — SENNA PLUS 2 TABLET(S): 8.6 TABLET ORAL at 21:50

## 2022-05-13 RX ADMIN — Medication 25 MILLIGRAM(S): at 14:20

## 2022-05-13 RX ADMIN — LOSARTAN POTASSIUM 25 MILLIGRAM(S): 100 TABLET, FILM COATED ORAL at 06:11

## 2022-05-13 RX ADMIN — PREGABALIN 1000 MICROGRAM(S): 225 CAPSULE ORAL at 13:06

## 2022-05-13 RX ADMIN — Medication 25 MILLIGRAM(S): at 21:49

## 2022-05-13 RX ADMIN — CHLORHEXIDINE GLUCONATE 1 APPLICATION(S): 213 SOLUTION TOPICAL at 13:07

## 2022-05-13 RX ADMIN — ATORVASTATIN CALCIUM 20 MILLIGRAM(S): 80 TABLET, FILM COATED ORAL at 21:50

## 2022-05-13 RX ADMIN — Medication 2: at 08:55

## 2022-05-13 RX ADMIN — Medication 2: at 13:04

## 2022-05-13 RX ADMIN — Medication 1334 MILLIGRAM(S): at 08:56

## 2022-05-13 RX ADMIN — GABAPENTIN 300 MILLIGRAM(S): 400 CAPSULE ORAL at 13:06

## 2022-05-13 RX ADMIN — CARVEDILOL PHOSPHATE 6.25 MILLIGRAM(S): 80 CAPSULE, EXTENDED RELEASE ORAL at 06:11

## 2022-05-13 RX ADMIN — CARVEDILOL PHOSPHATE 6.25 MILLIGRAM(S): 80 CAPSULE, EXTENDED RELEASE ORAL at 17:48

## 2022-05-13 RX ADMIN — Medication 75 MICROGRAM(S): at 06:11

## 2022-05-13 RX ADMIN — LORATADINE 10 MILLIGRAM(S): 10 TABLET ORAL at 13:07

## 2022-05-13 RX ADMIN — Medication 1 MILLIGRAM(S): at 13:07

## 2022-05-13 RX ADMIN — Medication 25 MILLIGRAM(S): at 06:11

## 2022-05-13 RX ADMIN — Medication 1334 MILLIGRAM(S): at 17:47

## 2022-05-13 NOTE — PROGRESS NOTE ADULT - SUBJECTIVE AND OBJECTIVE BOX
SUBJECTIVE / OVERNIGHT EVENTS:  she feels well  permcath in place  denied pain  No chest pain, no shortness of breath.   No overnight event.   No N/V/D. No abdominal pain.  discussed with the daughter Yin, primary caregiver.   Explained and updated her clinical status.  Emphasized the importance of following up with "Liver specialist" and "[facility]"        --------------------------------------------------------------------------------------------  LABS:                        8.5    7.17  )-----------( 186      ( 13 May 2022 06:04 )             26.7     05-13    136  |  99  |  25<H>  ----------------------------<  128<H>  4.2   |  26  |  2.69<H>    Ca    8.4      13 May 2022 06:04  Phos  3.4     05-13  Mg     2.10     05-13      PT/INR - ( 12 May 2022 07:15 )   PT: 12.9 sec;   INR: 1.11 ratio         PTT - ( 12 May 2022 07:15 )  PTT:27.9 sec  CAPILLARY BLOOD GLUCOSE  228 (13 May 2022 12:50)      POCT Blood Glucose.: 186 mg/dL (13 May 2022 17:43)  POCT Blood Glucose.: 228 mg/dL (13 May 2022 12:50)  POCT Blood Glucose.: 236 mg/dL (13 May 2022 08:47)  POCT Blood Glucose.: 302 mg/dL (12 May 2022 22:09)            RADIOLOGY & ADDITIONAL TESTS:    Imaging Personally Reviewed:  [x] YES  [ ] NO    Consultant(s) Notes Reviewed:  [x] YES  [ ] NO    MEDICATIONS  (STANDING):  aspirin enteric coated 81 milliGRAM(s) Oral daily  atorvastatin 20 milliGRAM(s) Oral at bedtime  calcium acetate 1334 milliGRAM(s) Oral three times a day with meals  carvedilol 6.25 milliGRAM(s) Oral every 12 hours  chlorhexidine 2% Cloths 1 Application(s) Topical daily  chlorhexidine 2% Cloths 1 Application(s) Topical daily  chlorhexidine 4% Liquid 1 Application(s) Topical <User Schedule>  cyanocobalamin 1000 MICROGram(s) Oral daily  dextrose 5%. 1000 milliLiter(s) (50 mL/Hr) IV Continuous <Continuous>  dextrose 5%. 1000 milliLiter(s) (100 mL/Hr) IV Continuous <Continuous>  dextrose 50% Injectable 25 Gram(s) IV Push once  dextrose 50% Injectable 12.5 Gram(s) IV Push once  dextrose 50% Injectable 25 Gram(s) IV Push once  epoetin denise-epbx (RETACRIT) Injectable 95805 Unit(s) IV Push <User Schedule>  folic acid 1 milliGRAM(s) Oral daily  gabapentin 300 milliGRAM(s) Oral daily  glucagon  Injectable 1 milliGRAM(s) IntraMuscular once  hydrALAZINE 25 milliGRAM(s) Oral three times a day  insulin glargine Injectable (LANTUS) 10 Unit(s) SubCutaneous at bedtime  insulin lispro (ADMELOG) corrective regimen sliding scale   SubCutaneous three times a day before meals  insulin lispro (ADMELOG) corrective regimen sliding scale   SubCutaneous at bedtime  iron sucrose Injectable 100 milliGRAM(s) IV Push <User Schedule>  levothyroxine 75 MICROGram(s) Oral daily  loratadine 10 milliGRAM(s) Oral daily  losartan 25 milliGRAM(s) Oral daily  pantoprazole    Tablet 40 milliGRAM(s) Oral before breakfast  polyethylene glycol 3350 17 Gram(s) Oral daily  senna 2 Tablet(s) Oral at bedtime    MEDICATIONS  (PRN):  acetaminophen     Tablet .. 650 milliGRAM(s) Oral every 6 hours PRN Mild Pain (1 - 3), Moderate Pain (4 - 6), Severe Pain (7 - 10)  dextrose Oral Gel 15 Gram(s) Oral once PRN Blood Glucose LESS THAN 70 milliGRAM(s)/deciliter  sodium chloride 0.9% lock flush 10 milliLiter(s) IV Push every 1 hour PRN Pre/post blood products, medications, blood draw, and to maintain line patency      Care Discussed with Consultants/Other Providers [x] YES  [ ] NO    Vital Signs Last 24 Hrs  T(C): 36.8 (13 May 2022 17:45), Max: 37.6 (13 May 2022 06:05)  T(F): 98.3 (13 May 2022 17:45), Max: 99.6 (13 May 2022 06:05)  HR: 65 (13 May 2022 17:45) (64 - 67)  BP: 120/53 (13 May 2022 17:45) (107/44 - 128/48)  BP(mean): --  RR: 17 (13 May 2022 17:45) (17 - 18)  SpO2: 99% (13 May 2022 17:45) (99% - 100%)  I&O's Summary    12 May 2022 07:01  -  13 May 2022 07:00  --------------------------------------------------------  IN: 400 mL / OUT: 1900 mL / NET: -1500 mL        PHYSICAL EXAM:  GENERAL: NAD, COMFORTABLE, out of bed in chair  HEENT: NCAT, EOMI  NECK: Supple, No JVD  CHEST/LUNG: mild decrease breath sounds bilaterally; No wheeze, + Shiley cath --> Permcath interchanged. dressing d/c/i  HEART: Regular rate and rhythm; nl S1/S2; No murmurs, rubs, or gallops  ABDOMEN: Soft, Nontender, Nondistended; Bowel sounds present; No hepatosplenomegaly  EXTREMITIES:  2+ Peripheral Pulses; residual b/l LE edema   SKIN: no rash. no lesions

## 2022-05-13 NOTE — PROGRESS NOTE ADULT - ASSESSMENT
66F PMH CKD 5 not on hd, HTN, HLD, DM2, hypothyroid recent hospital admission Mar 2022 with plans for starting outpt dialysis presenting with fluid overload, worsen renal function, need to initiate dialysis,    CKD stage 5 now on HD ---> ESRD on HD 5/3/2022  now with worsen renal function, mild uremic symptom and fluid overload  initiated HD 5/3   s/p shiley 5/3 with IR  permacath today by IR  AVF left upper arm created on 5/10/2022  s/p hd 5/12  will continue TTS schedule  s/p HD on 5/9  consent in chart.   - Monitor BMP   - Avoid nephrotoxics, NSAIDS RCA  pt accepted to [facility] TTS schedule. dc planning post hd tmr    Fluid overload  sec to renal failure  UF with HD as tolerated  f/u cardio    HTN   controlled  continue current meds.   monitor BP       anemia  iron sat 15%  will start iron with hd  epo with HD   monitor  transfuse to keep hb>7    hyponatremia  likely fluid overload  free water restriction <1L/day    Hyperphosphatemia  continue  binders  low PO4 diet  monitor    hypocalcemia now corrected.   optimal PTH for ckd  improved  monitor    hypokalemia resolved  Monitor  hd with high k bath    Hepatitis B  known  per daughter did not follow up with hepatology  +hep b PCR  f/u hepatology      66F PMH CKD 5 not on hd, HTN, HLD, DM2, hypothyroid recent hospital admission Mar 2022 with plans for starting outpt dialysis presenting with fluid overload, worsen renal function, need to initiate dialysis,    CKD stage 5 now on HD ---> ESRD on HD 5/3/2022  now with worsen renal function, mild uremic symptom and fluid overload  initiated HD 5/3   s/p shiley 5/3 with IR  Permacath placement on 5/12 and was HD with no issues.   AVF left upper arm created on 5/10/2022  will continue TTS schedule  s/p HD on 5/9  consent in chart.   - Monitor BMP   - Avoid nephrotoxics, NSAIDS RCA  pt accepted to [facility] TTS schedule. dc planning post hd tmr    Fluid overload  sec to renal failure  UF with HD as tolerated  f/u cardio    HTN   controlled  continue current meds.   monitor BP       anemia  iron sat 15%  will start iron with hd  epo with HD   monitor  transfuse to keep hb>7    hyponatremia  likely fluid overload  free water restriction <1L/day    Hyperphosphatemia  continue  binders  low PO4 diet  monitor    hypocalcemia now corrected.   optimal PTH for ckd  improved  monitor    hypokalemia resolved  Monitor  hd with high k bath    Hepatitis B  known  per daughter did not follow up with hepatology  +hep b PCR  f/u hepatology

## 2022-05-13 NOTE — PROGRESS NOTE ADULT - ASSESSMENT
65 yo female with ho HTN, DM, HLD, CKD, hypothyroidism present with progressive SOB over last 3 days with LE edema, abd bloating and periorbital edema c/w ESRD with fluid overload.  Pt to start HD this admission.    Problem/Plan - 1:  ·  Problem: ESRD on new hemodialysis.   ·  Plan: pt with advanced CKD now req HD  IR placed non tunneled catheter 5/3/22 for HD   vasc consulted for AVF, placed LUE 5/10/22  HD per nephrology  pt rec'd Lasix 40 mg in ED  cont Ca acetate, oral bicarb for now  Cardiac clearance appreciated  IR-guided conversion to tunneled catheter 5/12/22  dc planning. d/w the daughter.     Problem/Plan - 2:  ·  Problem: HTN (hypertension).   ·  Plan: cont coreg, nifedipine, hydralazine with hold parameters  cont ASA  elevated trop with no delta, likely due to renal dz, pt without CP.    Problem/Plan - 3:  ·  Problem: Diabetes mellitus, type 2.   ·  Plan: pt on lantus, 70/30, trulicity at home  will cont lantus here with sliding scale for now  adjust as needed  a1c = 5.6    Problem/Plan - 4:  ·  Problem: Hypothyroidism.   ·  Plan: cont synthroid  TSH = 1.55.    Problem/Plan - 5:  ·  Problem: Anemia of chronic disease.   ·  Plan: likely multifactorial  B12 def, ESRD.    Problem/Plan - 6:  ·  Problem: Hepatitis B  Hepatology following  HBeAg/ab reviewed.   U/S elastography and complements per hepatology team   Noted cirrhosis.  will need follow up outpt with hepatology.   DVT ppx

## 2022-05-13 NOTE — PROGRESS NOTE ADULT - SUBJECTIVE AND OBJECTIVE BOX
Gary Morrison MD  Interventional Cardiology / Endovascular Specialist  [place] Office : [phone] [place] [address] [place] [address]  Tel:   [place] Office : [phone] [facility] [place] [address]  Tel: [phone]    Subjective/Overnight events: Patient lying in bed comfortably. No acute distress.   	  MEDICATIONS:  aspirin enteric coated 81 milliGRAM(s) Oral daily  carvedilol 6.25 milliGRAM(s) Oral every 12 hours  hydrALAZINE 25 milliGRAM(s) Oral three times a day  losartan 25 milliGRAM(s) Oral daily      loratadine 10 milliGRAM(s) Oral daily    acetaminophen     Tablet .. 650 milliGRAM(s) Oral every 6 hours PRN  gabapentin 300 milliGRAM(s) Oral daily    pantoprazole    Tablet 40 milliGRAM(s) Oral before breakfast  polyethylene glycol 3350 17 Gram(s) Oral daily  senna 2 Tablet(s) Oral at bedtime    atorvastatin 20 milliGRAM(s) Oral at bedtime  dextrose 50% Injectable 12.5 Gram(s) IV Push once  dextrose 50% Injectable 25 Gram(s) IV Push once  dextrose 50% Injectable 25 Gram(s) IV Push once  dextrose Oral Gel 15 Gram(s) Oral once PRN  glucagon  Injectable 1 milliGRAM(s) IntraMuscular once  insulin glargine Injectable (LANTUS) 10 Unit(s) SubCutaneous at bedtime  insulin lispro (ADMELOG) corrective regimen sliding scale   SubCutaneous three times a day before meals  insulin lispro (ADMELOG) corrective regimen sliding scale   SubCutaneous at bedtime  levothyroxine 75 MICROGram(s) Oral daily    calcium acetate 1334 milliGRAM(s) Oral three times a day with meals  chlorhexidine 2% Cloths 1 Application(s) Topical daily  chlorhexidine 2% Cloths 1 Application(s) Topical daily  chlorhexidine 4% Liquid 1 Application(s) Topical <User Schedule>  cyanocobalamin 1000 MICROGram(s) Oral daily  dextrose 5%. 1000 milliLiter(s) IV Continuous <Continuous>  dextrose 5%. 1000 milliLiter(s) IV Continuous <Continuous>  epoetin denise-epbx (RETACRIT) Injectable 78364 Unit(s) IV Push <User Schedule>  folic acid 1 milliGRAM(s) Oral daily  iron sucrose Injectable 100 milliGRAM(s) IV Push <User Schedule>  sodium chloride 0.9% lock flush 10 milliLiter(s) IV Push every 1 hour PRN      PAST MEDICAL/SURGICAL HISTORY  PAST MEDICAL & SURGICAL HISTORY:  HTN (hypertension)      HLD (hyperlipidemia)          SOCIAL HISTORY: Substance Use (street drugs): ( x ) never used  (  ) other:    FAMILY HISTORY:      REVIEW OF SYSTEMS:  CONSTITUTIONAL: No fever, weight loss, or fatigue  EYES: No eye pain, visual disturbances, or discharge  ENMT:  No difficulty hearing, tinnitus, vertigo; No sinus or throat pain  BREASTS: No pain, masses, or nipple discharge  GASTROINTESTINAL: No abdominal or epigastric pain. No nausea, vomiting, or hematemesis; No diarrhea or constipation. No melena or hematochezia.  GENITOURINARY: No dysuria, frequency, hematuria, or incontinence  NEUROLOGICAL: No headaches, memory loss, loss of strength, numbness, or tremors  ENDOCRINE: No heat or cold intolerance; No hair loss  MUSCULOSKELETAL: No joint pain or swelling; No muscle, back, or extremity pain  PSYCHIATRIC: No depression, anxiety, mood swings, or difficulty sleeping  HEME/LYMPH: No easy bruising, or bleeding gums  All others negative    PHYSICAL EXAM:  T(C): 36.5 (05-13-22 @ 14:20), Max: 37.6 (05-13-22 @ 06:05)  HR: 67 (05-13-22 @ 14:20) (62 - 68)  BP: 126/52 (05-13-22 @ 14:20) (107/44 - 148/60)  RR: 18 (05-13-22 @ 14:20) (18 - 18)  SpO2: 99% (05-13-22 @ 14:20) (99% - 100%)  Wt(kg): --  I&O's Summary    12 May 2022 07:01  -  13 May 2022 07:00  --------------------------------------------------------  IN: 400 mL / OUT: 1900 mL / NET: -1500 mL    GENERAL: NAD  EYES: EOMI, PERRLA, conjunctiva and sclera clear  ENMT: No tonsillar erythema, exudates, or enlargement  Cardiovascular: Normal S1 S2, No JVD, No murmurs, No edema  Respiratory: Lungs clear to auscultation	  Gastrointestinal:  Soft, Non-tender, + BS	  Extremities: No edema                              8.5    7.17  )-----------( 186      ( 13 May 2022 06:04 )             26.7     05-13    136  |  99  |  25<H>  ----------------------------<  128<H>  4.2   |  26  |  2.69<H>    Ca    8.4      13 May 2022 06:04  Phos  3.4     05-13  Mg     2.10     05-13      proBNP:   Lipid Profile:   HgA1c:   TSH:     Consultant(s) Notes Reviewed:  [x ] YES  [ ] NO    Care Discussed with Consultants/Other Providers [ x] YES  [ ] NO    Imaging Personally Reviewed independently:  [x] YES  [ ] NO    All labs, radiologic studies, vitals, orders and medications list reviewed. Patient is seen and examined at bedside. Case discussed with medical team.

## 2022-05-13 NOTE — PROGRESS NOTE ADULT - ASSESSMENT
Impression:  65 yo F w/ PMHx DM, HTN, HLD, CKD V admitted for HD initiation, hepatology consulted for positive HBV serologies.    # HBV - patient w/ evidence of ongoing HBV, evidenced by positive HBVsAg and HBVcAb, HBV PCR quant 95. Hep E antibody positive, E antigen negative (likely chronic infection) and imaging showing cirrhosis. Patient has low viral load and mild elevation in ALT, which likely represents immune control stage (vs immune active). Additionally, patient has longstanding CKD now in ESRD w/ HD, which has been attributed to metabolic syndrome, however would want to r/o HBV glomerulonephritis. Additionally, will evaluate for other extra-hepatic manifestations of HBV (ex vasculitis)    Recommendations:  - trend liver enzymes  - will need EGD for variceal screening, can tentatively plan for next week if patient remains inpatient   - check hepatitis delta antibody  - check serum immune complexes  - check serum complements (C3, C4, total complements)  - clarify with nephrology if patient ever had further workup for CKD including renal biopsy as outpatient  - patient will likely be initiated on antiviral (likely entecavir) as outpatient   - rest of care per primary team    Hepatology  will continue to follow.     All recommendations are tentative until note is attested by attending.     Jonatan Boone, PGY5  Gastroenterology/Hepatology Fellow  Available on [employer] Teams  [phone] ([place] Short Range Pager)  [phone] (Long Range Pager)    After 5pm, please contact the on-call GI fellow. [phone]   Impression:  65 yo F w/ PMHx DM, HTN, HLD, CKD V admitted for HD initiation, hepatology consulted for positive HBV serologies.    # HBV - patient w/ evidence of ongoing HBV, evidenced by positive HBVsAg and HBVcAb, HBV PCR quant 95. Hep E antibody positive, E antigen negative (likely chronic infection) and imaging showing cirrhosis. Patient has low viral load and mild elevation in ALT, which likely represents immune control stage (vs immune active). Additionally, patient has longstanding CKD now in ESRD w/ HD, which has been attributed to metabolic syndrome, however would want to r/o HBV glomerulonephritis. Additionally, will evaluate for other extra-hepatic manifestations of HBV (ex vasculitis)    Recommendations:  - trend liver enzymes  - will need EGD for variceal screening, can be done as outpatient   - check hepatitis delta antibody  - check serum immune complexes  - check serum complements (C3, C4, total complements)  - clarify with nephrology if patient ever had further workup for CKD including renal biopsy as outpatient  - patient will likely be initiated on antiviral (likely entecavir) as outpatient   - rest of care per primary team    Hepatology to sign off. We will arrange outpatient follow up for her    All recommendations are tentative until note is attested by attending.     Jonatan Boone, PGY5  Gastroenterology/Hepatology Fellow  Available on [employer] Teams  [phone] ([employer] Short Range Pager)  [phone] (Long Range Pager)    After 5pm, please contact the on-call GI fellow. [phone]

## 2022-05-13 NOTE — PROGRESS NOTE ADULT - NS ATTEND AMEND GEN_ALL_CORE FT
I reviewed the overnight course of events on the unit, re-confirming the patient history. I discussed the care with the patient and their family. The plan of care was discussed with the ACP team and modifications were made to the notation where appropriate. Differential diagnosis and plan of care discussed with patient after the evaluation. Advanced care planning was discussed with patient and family.  Advanced care planning forms were reviewed and discussed.  Risks, benefits and alternatives of cardiac procedures were discussed in detail and all questions were answered. 35 minutes spent on total encounter of which more than fifty percent of the encounter was spent counseling and/or coordinating care by the attending physician.
Seen on HD getting 1.5 liters of UF off.
Spoke with patient and she is tolerating dialysis.
ESRD. HD as planned.
I reviewed the overnight course of events on the unit, re-confirming the patient history. I discussed the care with the patient and their family. The plan of care was discussed with the ACP team and modifications were made to the notation where appropriate. Differential diagnosis and plan of care discussed with patient after the evaluation. Advanced care planning was discussed with patient and family.  Advanced care planning forms were reviewed and discussed.  Risks, benefits and alternatives of cardiac procedures were discussed in detail and all questions were answered. 35 minutes spent on total encounter of which more than fifty percent of the encounter was spent counseling and/or coordinating care by the attending physician.
I reviewed the overnight course of events on the unit, re-confirming the patient history. I discussed the care with the patient and their family. The plan of care was discussed with the ACP team and modifications were made to the notation where appropriate. Differential diagnosis and plan of care discussed with patient after the evaluation. Advanced care planning was discussed with patient and family.  Advanced care planning forms were reviewed and discussed.  Risks, benefits and alternatives of cardiac procedures were discussed in detail and all questions were answered. 35 minutes spent on total encounter of which more than fifty percent of the encounter was spent counseling and/or coordinating care by the attending physician.
IR consulted for permacath placement.
I reviewed the overnight course of events on the unit, re-confirming the patient history. I discussed the care with the patient and their family. The plan of care was discussed with the ACP team and modifications were made to the notation where appropriate. Differential diagnosis and plan of care discussed with patient after the evaluation. Advanced care planning was discussed with patient and family.  Advanced care planning forms were reviewed and discussed.  Risks, benefits and alternatives of cardiac procedures were discussed in detail and all questions were answered. 35 minutes spent on total encounter of which more than fifty percent of the encounter was spent counseling and/or coordinating care by the attending physician.
Pt was seen on Hemodialysis. New AVF has thrill and was clean dry and intact.

## 2022-05-13 NOTE — PROGRESS NOTE ADULT - SUBJECTIVE AND OBJECTIVE BOX
Gastroenterology/Hepatology Progress Note      Interval Events:   - no acute events overnight; underwent IR tunneled catheter yesterday  - labs w/ hepatitis b e antibody positive, e antigen negative  - RUQ US showing cirrhosis     Allergies:  No Known Allergies      Hospital Medications:  acetaminophen     Tablet .. 650 milliGRAM(s) Oral every 6 hours PRN  aspirin enteric coated 81 milliGRAM(s) Oral daily  atorvastatin 20 milliGRAM(s) Oral at bedtime  calcium acetate 1334 milliGRAM(s) Oral three times a day with meals  carvedilol 6.25 milliGRAM(s) Oral every 12 hours  chlorhexidine 2% Cloths 1 Application(s) Topical daily  chlorhexidine 2% Cloths 1 Application(s) Topical daily  chlorhexidine 4% Liquid 1 Application(s) Topical <User Schedule>  cyanocobalamin 1000 MICROGram(s) Oral daily  dextrose 5%. 1000 milliLiter(s) IV Continuous <Continuous>  dextrose 5%. 1000 milliLiter(s) IV Continuous <Continuous>  dextrose 50% Injectable 25 Gram(s) IV Push once  dextrose 50% Injectable 12.5 Gram(s) IV Push once  dextrose 50% Injectable 25 Gram(s) IV Push once  dextrose Oral Gel 15 Gram(s) Oral once PRN  epoetin denise-epbx (RETACRIT) Injectable 59715 Unit(s) IV Push <User Schedule>  folic acid 1 milliGRAM(s) Oral daily  gabapentin 300 milliGRAM(s) Oral daily  glucagon  Injectable 1 milliGRAM(s) IntraMuscular once  hydrALAZINE 25 milliGRAM(s) Oral three times a day  insulin glargine Injectable (LANTUS) 10 Unit(s) SubCutaneous at bedtime  insulin lispro (ADMELOG) corrective regimen sliding scale   SubCutaneous three times a day before meals  insulin lispro (ADMELOG) corrective regimen sliding scale   SubCutaneous at bedtime  iron sucrose Injectable 100 milliGRAM(s) IV Push <User Schedule>  levothyroxine 75 MICROGram(s) Oral daily  loratadine 10 milliGRAM(s) Oral daily  losartan 25 milliGRAM(s) Oral daily  pantoprazole    Tablet 40 milliGRAM(s) Oral before breakfast  polyethylene glycol 3350 17 Gram(s) Oral daily  senna 2 Tablet(s) Oral at bedtime  sodium chloride 0.9% lock flush 10 milliLiter(s) IV Push every 1 hour PRN      ROS: 14 point ROS negative unless otherwise state in subjective    PHYSICAL EXAM:   Vital Signs:  Vital Signs Last 24 Hrs  T(C): 37.6 (13 May 2022 06:05), Max: 37.6 (13 May 2022 06:05)  T(F): 99.6 (13 May 2022 06:05), Max: 99.6 (13 May 2022 06:05)  HR: 65 (13 May 2022 06:05) (62 - 68)  BP: 128/48 (13 May 2022 06:05) (120/51 - 148/60)  BP(mean): --  RR: 18 (13 May 2022 06:05) (18 - 18)  SpO2: 100% (13 May 2022 06:05) (99% - 100%)  Daily     Daily Weight in k (12 May 2022 20:15)    GENERAL:  No acute distress  HEENT:  NCAT, no scleral icterus  CHEST: no resp distress  HEART:  RRR  ABDOMEN:  Soft, non-tender, non-distended, normoactive bowel sounds, no masses  EXTREMITIES:  No cyanosis, clubbing, or edema  SKIN:  No rash/erythema/ecchymoses/petechiae/wounds/abscess/warm/dry  NEURO:  Alert and oriented x 3, no asterixis, no tremor    LABS:                        8.5    7.17  )-----------( 186      ( 13 May 2022 06:04 )             26.7     Mean Cell Volume: 89.0 fL (13-22 @ 06:04)    05-13    136  |  99  |  25<H>  ----------------------------<  128<H>  4.2   |  26  |  2.69<H>    Ca    8.4      13 May 2022 06:04  Phos  3.4     -13  Mg     2.10     05-13        PT/INR - ( 12 May 2022 07:15 )   PT: 12.9 sec;   INR: 1.11 ratio         PTT - ( 12 May 2022 07:15 )  PTT:27.9 sec          Imaging:

## 2022-05-13 NOTE — PROGRESS NOTE ADULT - NS ATTEND OPT1 GEN_ALL_CORE
I attest my time as attending is greater than 50% of the total combined time spent on qualifying patient care activities by the PA/NP and attending.

## 2022-05-13 NOTE — PROGRESS NOTE ADULT - ATTENDING COMMENTS
Patient with HBV infection and end stage renal disease. await liver imaging and additional serology. when stable from renal perspective, can have HBV management as outpatient.
Patient with renal failure and newly identified HBV infection. serology suggests HBV infection is chronic and ultrasound raises possibility of cirrhosis. Patient will need MR elastography or fibroscan and EGD in future. Outpatient follow up is important for therapy continuation and suggest HBV antiviral therapy be managed as outpatient. Family will need HBV screening / vaccination / education.

## 2022-05-13 NOTE — PROGRESS NOTE ADULT - SUBJECTIVE AND OBJECTIVE BOX
[facility]   MD ANA KAPADIA MD KRISTINE SOLTANPOUR, DO ANGELA WONG, PA    TEL:  OFFICE: [phone]  From 5pm-7am Answering Service [phone]    -- RENAL FOLLOW UP NOTE ---Date of Service 05-13-22 @ 15:47    Patient is a 66y old  Female who presents with a chief complaint of progressive SOB (13 May 2022 15:07)      Patient seen and examined at bedside. No chest pain/sob    VITALS:  T(F): 97.7 (05-13-22 @ 14:20), Max: 99.6 (05-13-22 @ 06:05)  HR: 67 (05-13-22 @ 14:20)  BP: 126/52 (05-13-22 @ 14:20)  RR: 18 (05-13-22 @ 14:20)  SpO2: 99% (05-13-22 @ 14:20)  Wt(kg): --    05-12 @ 07:01  -  05-13 @ 07:00  --------------------------------------------------------  IN: 400 mL / OUT: 1900 mL / NET: -1500 mL          PHYSICAL EXAM:  Constitutional: NAD  Neck: No JVD  Respiratory: CTAB, no wheezes, rales or rhonchi  Cardiovascular: S1, S2, RRR  Gastrointestinal: BS+, soft, NT/ND  Extremities: No peripheral edema    Hospital Medications:   MEDICATIONS  (STANDING):  aspirin enteric coated 81 milliGRAM(s) Oral daily  atorvastatin 20 milliGRAM(s) Oral at bedtime  calcium acetate 1334 milliGRAM(s) Oral three times a day with meals  carvedilol 6.25 milliGRAM(s) Oral every 12 hours  chlorhexidine 2% Cloths 1 Application(s) Topical daily  chlorhexidine 2% Cloths 1 Application(s) Topical daily  chlorhexidine 4% Liquid 1 Application(s) Topical <User Schedule>  cyanocobalamin 1000 MICROGram(s) Oral daily  dextrose 5%. 1000 milliLiter(s) (50 mL/Hr) IV Continuous <Continuous>  dextrose 5%. 1000 milliLiter(s) (100 mL/Hr) IV Continuous <Continuous>  dextrose 50% Injectable 12.5 Gram(s) IV Push once  dextrose 50% Injectable 25 Gram(s) IV Push once  dextrose 50% Injectable 25 Gram(s) IV Push once  epoetin denise-epbx (RETACRIT) Injectable 00992 Unit(s) IV Push <User Schedule>  folic acid 1 milliGRAM(s) Oral daily  gabapentin 300 milliGRAM(s) Oral daily  glucagon  Injectable 1 milliGRAM(s) IntraMuscular once  hydrALAZINE 25 milliGRAM(s) Oral three times a day  insulin glargine Injectable (LANTUS) 10 Unit(s) SubCutaneous at bedtime  insulin lispro (ADMELOG) corrective regimen sliding scale   SubCutaneous three times a day before meals  insulin lispro (ADMELOG) corrective regimen sliding scale   SubCutaneous at bedtime  iron sucrose Injectable 100 milliGRAM(s) IV Push <User Schedule>  levothyroxine 75 MICROGram(s) Oral daily  loratadine 10 milliGRAM(s) Oral daily  losartan 25 milliGRAM(s) Oral daily  pantoprazole    Tablet 40 milliGRAM(s) Oral before breakfast  polyethylene glycol 3350 17 Gram(s) Oral daily  senna 2 Tablet(s) Oral at bedtime      LABS:  05-13    136  |  99  |  25<H>  ----------------------------<  128<H>  4.2   |  26  |  2.69<H>    Ca    8.4      13 May 2022 06:04  Phos  3.4     05-13  Mg     2.10     05-13      Creatinine Trend: 2.69 <--, 3.29 <--, 3.71 <--, 2.73 <--, 3.15 <--, 2.71 <--, 3.17 <--    Phosphorus Level, Serum: 3.4 mg/dL (05-13 @ 06:04)                              8.5    7.17  )-----------( 186      ( 13 May 2022 06:04 )             26.7     Urine Studies:      Iron 37, TIBC 252, %sat 15      [05-07-22 @ 07:00]  Ferritin 83      [05-07-22 @ 07:00]  PTH -- (Ca --)      [05-07-22 @ 06:59]   204  TSH 1.55      [05-04-22 @ 06:42]    HBsAb <3.0      [05-04-22 @ 05:51]  HBsAg Reactive      [05-04-22 @ 05:02]  HBcAb Reactive      [05-04-22 @ 05:51]  HCV 0.11, Nonreact      [05-12-22 @ 07:15]    C3 Complement 116      [05-12-22 @ 07:15]  C4 Complement 56      [05-12-22 @ 07:15]    RADIOLOGY & ADDITIONAL STUDIES:   [facility]   MD ANA KAPADIA MD KRISTINE SOLTANPOUR, DO ANGELA WONG, PA    TEL:  OFFICE: [phone]  From 5pm-7am Answering Service [phone]    -- RENAL FOLLOW UP NOTE ---Date of Service 05-13-22 @ 15:47    Patient is a 66y old  Female who presents with a chief complaint of progressive SOB (13 May 2022 15:07)      Patient seen and examined at bedside. No chest pain/sob    VITALS:  T(F): 97.7 (05-13-22 @ 14:20), Max: 99.6 (05-13-22 @ 06:05)  HR: 67 (05-13-22 @ 14:20)  BP: 126/52 (05-13-22 @ 14:20)  RR: 18 (05-13-22 @ 14:20)  SpO2: 99% (05-13-22 @ 14:20)  Wt(kg): --    05-12 @ 07:01  -  05-13 @ 07:00  --------------------------------------------------------  IN: 400 mL / OUT: 1900 mL / NET: -1500 mL          PHYSICAL EXAM:  Constitutional: NAD  Neck: No JVD  Respiratory: CTAB, no wheezes, rales or rhonchi  Cardiovascular: S1, S2, RRR  Gastrointestinal: BS+, soft, NT/ND  Extremities: No peripheral edema  Access: Left forearm AVF with positive thrill and bruit.     Hospital Medications:   MEDICATIONS  (STANDING):  aspirin enteric coated 81 milliGRAM(s) Oral daily  atorvastatin 20 milliGRAM(s) Oral at bedtime  calcium acetate 1334 milliGRAM(s) Oral three times a day with meals  carvedilol 6.25 milliGRAM(s) Oral every 12 hours  chlorhexidine 2% Cloths 1 Application(s) Topical daily  chlorhexidine 2% Cloths 1 Application(s) Topical daily  chlorhexidine 4% Liquid 1 Application(s) Topical <User Schedule>  cyanocobalamin 1000 MICROGram(s) Oral daily  dextrose 5%. 1000 milliLiter(s) (50 mL/Hr) IV Continuous <Continuous>  dextrose 5%. 1000 milliLiter(s) (100 mL/Hr) IV Continuous <Continuous>  dextrose 50% Injectable 12.5 Gram(s) IV Push once  dextrose 50% Injectable 25 Gram(s) IV Push once  dextrose 50% Injectable 25 Gram(s) IV Push once  epoetin denise-epbx (RETACRIT) Injectable 70722 Unit(s) IV Push <User Schedule>  folic acid 1 milliGRAM(s) Oral daily  gabapentin 300 milliGRAM(s) Oral daily  glucagon  Injectable 1 milliGRAM(s) IntraMuscular once  hydrALAZINE 25 milliGRAM(s) Oral three times a day  insulin glargine Injectable (LANTUS) 10 Unit(s) SubCutaneous at bedtime  insulin lispro (ADMELOG) corrective regimen sliding scale   SubCutaneous three times a day before meals  insulin lispro (ADMELOG) corrective regimen sliding scale   SubCutaneous at bedtime  iron sucrose Injectable 100 milliGRAM(s) IV Push <User Schedule>  levothyroxine 75 MICROGram(s) Oral daily  loratadine 10 milliGRAM(s) Oral daily  losartan 25 milliGRAM(s) Oral daily  pantoprazole    Tablet 40 milliGRAM(s) Oral before breakfast  polyethylene glycol 3350 17 Gram(s) Oral daily  senna 2 Tablet(s) Oral at bedtime      LABS:  05-13    136  |  99  |  25<H>  ----------------------------<  128<H>  4.2   |  26  |  2.69<H>    Ca    8.4      13 May 2022 06:04  Phos  3.4     05-13  Mg     2.10     05-13      Creatinine Trend: 2.69 <--, 3.29 <--, 3.71 <--, 2.73 <--, 3.15 <--, 2.71 <--, 3.17 <--    Phosphorus Level, Serum: 3.4 mg/dL (05-13 @ 06:04)                              8.5    7.17  )-----------( 186      ( 13 May 2022 06:04 )             26.7     Urine Studies:      Iron 37, TIBC 252, %sat 15      [05-07-22 @ 07:00]  Ferritin 83      [05-07-22 @ 07:00]  PTH -- (Ca --)      [05-07-22 @ 06:59]   204  TSH 1.55      [05-04-22 @ 06:42]    HBsAb <3.0      [05-04-22 @ 05:51]  HBsAg Reactive      [05-04-22 @ 05:02]  HBcAb Reactive      [05-04-22 @ 05:51]  HCV 0.11, Nonreact      [05-12-22 @ 07:15]    C3 Complement 116      [05-12-22 @ 07:15]  C4 Complement 56      [05-12-22 @ 07:15]    RADIOLOGY & ADDITIONAL STUDIES:

## 2022-05-14 ENCOUNTER — TRANSCRIPTION ENCOUNTER (OUTPATIENT)
Age: 67
End: 2022-05-14

## 2022-05-14 VITALS
SYSTOLIC BLOOD PRESSURE: 117 MMHG | OXYGEN SATURATION: 100 % | DIASTOLIC BLOOD PRESSURE: 60 MMHG | HEART RATE: 76 BPM | TEMPERATURE: 98 F | RESPIRATION RATE: 18 BRPM

## 2022-05-14 LAB
ANION GAP SERPL CALC-SCNC: 11 MMOL/L — SIGNIFICANT CHANGE UP (ref 7–14)
BUN SERPL-MCNC: 11 MG/DL — SIGNIFICANT CHANGE UP (ref 7–23)
CALCIUM SERPL-MCNC: 8.5 MG/DL — SIGNIFICANT CHANGE UP (ref 8.4–10.5)
CHLORIDE SERPL-SCNC: 98 MMOL/L — SIGNIFICANT CHANGE UP (ref 98–107)
CO2 SERPL-SCNC: 29 MMOL/L — SIGNIFICANT CHANGE UP (ref 22–31)
CREAT SERPL-MCNC: 1.23 MG/DL — SIGNIFICANT CHANGE UP (ref 0.5–1.3)
EGFR: 48 ML/MIN/1.73M2 — LOW
GLUCOSE SERPL-MCNC: 131 MG/DL — HIGH (ref 70–99)
HCT VFR BLD CALC: 28.5 % — LOW (ref 34.5–45)
HGB BLD-MCNC: 9.3 G/DL — LOW (ref 11.5–15.5)
MAGNESIUM SERPL-MCNC: 2 MG/DL — SIGNIFICANT CHANGE UP (ref 1.6–2.6)
MCHC RBC-ENTMCNC: 28.4 PG — SIGNIFICANT CHANGE UP (ref 27–34)
MCHC RBC-ENTMCNC: 32.6 GM/DL — SIGNIFICANT CHANGE UP (ref 32–36)
MCV RBC AUTO: 87.2 FL — SIGNIFICANT CHANGE UP (ref 80–100)
NRBC # BLD: 0 /100 WBCS — SIGNIFICANT CHANGE UP
NRBC # FLD: 0 K/UL — SIGNIFICANT CHANGE UP
PHOSPHATE SERPL-MCNC: 1.4 MG/DL — LOW (ref 2.5–4.5)
PLATELET # BLD AUTO: 179 K/UL — SIGNIFICANT CHANGE UP (ref 150–400)
POTASSIUM SERPL-MCNC: 3.1 MMOL/L — LOW (ref 3.5–5.3)
POTASSIUM SERPL-SCNC: 3.1 MMOL/L — LOW (ref 3.5–5.3)
RBC # BLD: 3.27 M/UL — LOW (ref 3.8–5.2)
RBC # FLD: 13.7 % — SIGNIFICANT CHANGE UP (ref 10.3–14.5)
SODIUM SERPL-SCNC: 138 MMOL/L — SIGNIFICANT CHANGE UP (ref 135–145)
WBC # BLD: 12.27 K/UL — HIGH (ref 3.8–10.5)
WBC # FLD AUTO: 12.27 K/UL — HIGH (ref 3.8–10.5)

## 2022-05-14 RX ORDER — LOSARTAN POTASSIUM 100 MG/1
1 TABLET, FILM COATED ORAL
Qty: 30 | Refills: 0
Start: 2022-05-14 | End: 2022-06-12

## 2022-05-14 RX ORDER — HYDRALAZINE HCL 50 MG
1 TABLET ORAL
Qty: 90 | Refills: 0
Start: 2022-05-14 | End: 2022-06-12

## 2022-05-14 RX ORDER — HYDRALAZINE HCL 50 MG
1 TABLET ORAL
Qty: 0 | Refills: 0 | DISCHARGE

## 2022-05-14 RX ORDER — SODIUM BICARBONATE 1 MEQ/ML
1 SYRINGE (ML) INTRAVENOUS
Qty: 0 | Refills: 0 | DISCHARGE

## 2022-05-14 RX ORDER — NIFEDIPINE 30 MG
1 TABLET, EXTENDED RELEASE 24 HR ORAL
Qty: 0 | Refills: 0 | DISCHARGE

## 2022-05-14 RX ADMIN — PREGABALIN 1000 MICROGRAM(S): 225 CAPSULE ORAL at 13:13

## 2022-05-14 RX ADMIN — CHLORHEXIDINE GLUCONATE 1 APPLICATION(S): 213 SOLUTION TOPICAL at 13:12

## 2022-05-14 RX ADMIN — HEPARIN SODIUM 5000 UNIT(S): 5000 INJECTION INTRAVENOUS; SUBCUTANEOUS at 05:32

## 2022-05-14 RX ADMIN — Medication 3: at 13:09

## 2022-05-14 RX ADMIN — Medication 1334 MILLIGRAM(S): at 13:11

## 2022-05-14 RX ADMIN — GABAPENTIN 300 MILLIGRAM(S): 400 CAPSULE ORAL at 13:12

## 2022-05-14 RX ADMIN — ERYTHROPOIETIN 10000 UNIT(S): 10000 INJECTION, SOLUTION INTRAVENOUS; SUBCUTANEOUS at 09:34

## 2022-05-14 RX ADMIN — PANTOPRAZOLE SODIUM 40 MILLIGRAM(S): 20 TABLET, DELAYED RELEASE ORAL at 05:32

## 2022-05-14 RX ADMIN — Medication 75 MICROGRAM(S): at 05:33

## 2022-05-14 RX ADMIN — POLYETHYLENE GLYCOL 3350 17 GRAM(S): 17 POWDER, FOR SOLUTION ORAL at 13:11

## 2022-05-14 RX ADMIN — Medication 1 MILLIGRAM(S): at 13:12

## 2022-05-14 RX ADMIN — Medication 81 MILLIGRAM(S): at 13:13

## 2022-05-14 RX ADMIN — LORATADINE 10 MILLIGRAM(S): 10 TABLET ORAL at 13:12

## 2022-05-14 RX ADMIN — Medication 1334 MILLIGRAM(S): at 09:00

## 2022-05-14 NOTE — DISCHARGE NOTE PROVIDER - NSDCCPCAREPLAN_GEN_ALL_CORE_FT
PRINCIPAL DISCHARGE DIAGNOSIS  Diagnosis: ESRD on hemodialysis  Assessment and Plan of Treatment: Your kidney function this admission worsened and likely contributed to your worsening shortness of breath. You has a tunneled catheter placed to have dialysis outpatient and you had a fistula created in your left arm which will be used for dialysis when it matures. Please follow up with your nephrologist in 1-2 weeks and follow up with Dr. Culver (vascular team) in 2 weeks.      SECONDARY DISCHARGE DIAGNOSES  Diagnosis: Hepatitis B  Assessment and Plan of Treatment: You were found to be positive for hepatitis B. Hepatology (the liver team) evaluated you and you will likely need anti-viral medication as an outpatient in addition to an endoscopy and further imaging. Your liver ultrasound showed cirrhosis (diseased liver). Please call to make an appointment to follow up with hepatology in 1-2 weeks. Please inform your family that you have hepatitis B and advise them to test and vaccinate under the guidance of their physician.    Diagnosis: Diabetes mellitus, type 2  Assessment and Plan of Treatment: Continue your medication regimen and a consistent carbohydrate diet (Meaning eating the same amount of carbohydrates at the same time each day). Monitor blood glucose levels throughout the day before meals and at bedtime. Record blood sugars and bring to outpatient providers appointment in order to be reviewed by your doctor for management modifications. If your sugars are more than 400 or less than 70 you should contact your PCP immediately. Monitor for signs/symptoms of low blood glucose, such as, dizziness, altered mental status, or cool/clammy skin. In addition, monitor for signs/symptoms of high blood glucose, such as, feeling hot, dry, fatigued, or with increased thirst/urination. Make regular podiatry appointments in order to have feet checked for wounds and uncontrolled toe nail growth to prevent infections, as well as, appointments with an ophthalmologist to monitor your vision.    Diagnosis: Hypothyroidism  Assessment and Plan of Treatment: Continue your synthroid as prescribed. Continue to follow up with your PCP or endocrinologist for routine blood work.    Diagnosis: HTN (hypertension)  Assessment and Plan of Treatment: Your blood pressure medications were adjusted. Continue blood pressure medications as prescribed. Routine follow up with your PCP for blood pressure checks and medication management. A low salt diet is recommended.

## 2022-05-14 NOTE — PROGRESS NOTE ADULT - SUBJECTIVE AND OBJECTIVE BOX
[facility] [place] [facility]   MD ANA KAPADIA MD KRISTINE SOLTANPOUR, DO ANGELA WONG, PA    TEL:  OFFICE: [phone]  From 5pm-7am Answering Service [phone]    -- RENAL FOLLOW UP NOTE ---Date of Service 05-14-22 @ 14:24    Patient is a 66y old  Female who presents with a chief complaint of progressive SOB (14 May 2022 08:59)      Patient seen and examined at bedside. No chest pain/sob    VITALS:  T(F): 98.4 (05-14-22 @ 13:10), Max: 98.4 (05-14-22 @ 13:10)  HR: 73 (05-14-22 @ 13:10)  BP: 116/61 (05-14-22 @ 13:10)  RR: 18 (05-14-22 @ 13:10)  SpO2: 100% (05-14-22 @ 13:10)  Wt(kg): --    05-14 @ 07:01  -  05-14 @ 14:24  --------------------------------------------------------  IN: 500 mL / OUT: 2000 mL / NET: -1500 mL          PHYSICAL EXAM:  Constitutional: NAD  Neck: No JVD  Respiratory: CTAB, no wheezes, rales or rhonchi  Cardiovascular: S1, S2, RRR  Gastrointestinal: BS+, soft, NT/ND  Extremities: No peripheral edema    Hospital Medications:   MEDICATIONS  (STANDING):  aspirin enteric coated 81 milliGRAM(s) Oral daily  atorvastatin 20 milliGRAM(s) Oral at bedtime  calcium acetate 1334 milliGRAM(s) Oral three times a day with meals  carvedilol 6.25 milliGRAM(s) Oral every 12 hours  chlorhexidine 2% Cloths 1 Application(s) Topical daily  chlorhexidine 2% Cloths 1 Application(s) Topical daily  chlorhexidine 4% Liquid 1 Application(s) Topical <User Schedule>  cyanocobalamin 1000 MICROGram(s) Oral daily  dextrose 5%. 1000 milliLiter(s) (50 mL/Hr) IV Continuous <Continuous>  dextrose 5%. 1000 milliLiter(s) (100 mL/Hr) IV Continuous <Continuous>  dextrose 50% Injectable 25 Gram(s) IV Push once  dextrose 50% Injectable 12.5 Gram(s) IV Push once  dextrose 50% Injectable 25 Gram(s) IV Push once  epoetin denise-epbx (RETACRIT) Injectable 21868 Unit(s) IV Push <User Schedule>  folic acid 1 milliGRAM(s) Oral daily  gabapentin 300 milliGRAM(s) Oral daily  glucagon  Injectable 1 milliGRAM(s) IntraMuscular once  heparin   Injectable 5000 Unit(s) SubCutaneous every 12 hours  hydrALAZINE 25 milliGRAM(s) Oral three times a day  insulin glargine Injectable (LANTUS) 10 Unit(s) SubCutaneous at bedtime  insulin lispro (ADMELOG) corrective regimen sliding scale   SubCutaneous three times a day before meals  insulin lispro (ADMELOG) corrective regimen sliding scale   SubCutaneous at bedtime  iron sucrose Injectable 100 milliGRAM(s) IV Push <User Schedule>  levothyroxine 75 MICROGram(s) Oral daily  loratadine 10 milliGRAM(s) Oral daily  losartan 25 milliGRAM(s) Oral daily  pantoprazole    Tablet 40 milliGRAM(s) Oral before breakfast  polyethylene glycol 3350 17 Gram(s) Oral daily  senna 2 Tablet(s) Oral at bedtime      LABS:  05-14    138  |  98  |  11  ----------------------------<  131<H>  3.1<L>   |  29  |  1.23    Ca    8.5      14 May 2022 10:27  Phos  1.4     05-14  Mg     2.00     05-14      Creatinine Trend: 1.23 <--, 2.69 <--, 3.29 <--, 3.71 <--, 2.73 <--, 3.15 <--, 2.71 <--    Phosphorus Level, Serum: 1.4 mg/dL (05-14 @ 10:27)                              9.3    12.27 )-----------( 179      ( 14 May 2022 10:27 )             28.5     Urine Studies:      Iron 37, TIBC 252, %sat 15      [05-07-22 @ 07:00]  Ferritin 83      [05-07-22 @ 07:00]  PTH -- (Ca --)      [05-07-22 @ 06:59]   204  TSH 1.55      [05-04-22 @ 06:42]    HBsAb <3.0      [05-04-22 @ 05:51]  HBsAg Reactive      [05-04-22 @ 05:02]  HBcAb Reactive      [05-04-22 @ 05:51]  HCV 0.11, Nonreact      [05-12-22 @ 07:15]    C3 Complement 116      [05-12-22 @ 07:15]  C4 Complement 56      [05-12-22 @ 07:15]    RADIOLOGY & ADDITIONAL STUDIES:

## 2022-05-14 NOTE — DISCHARGE NOTE PROVIDER - NSDCMRMEDTOKEN_GEN_ALL_CORE_FT
aspirin 81 mg oral delayed release tablet: 1 tab(s) orally once a day  atorvastatin 20 mg oral tablet: 1 tab(s) orally once a day  calcium acetate 667 mg oral tablet: 2 tab(s) orally 3 times a day (with meals)  carvedilol 6.25 mg oral tablet: 1 tab(s) orally 2 times a day  folic acid 1 mg oral tablet: 1 tab(s) orally once a day  gabapentin 300 mg oral capsule: 1 cap(s) orally once a day  Lanbebeus Solostar Pen 100 units/mL subcutaneous solution: 10 unit(s) subcutaneous once a day (at bedtime)  levothyroxine 75 mcg (0.075 mg) oral tablet: 1 tab(s) orally once a day  loratadine 10 mg oral tablet: 1 tab(s) orally once a day  NIFEdipine 90 mg oral tablet, extended release: 1 tab(s) orally once a day  NovoLOG Mix 70/30 subcutaneous suspension: 20 unit(s) subcutaneous once a day  pantoprazole 40 mg oral delayed release tablet: 1 tab(s) orally once a day (before a meal)  Trulicity Pen: subcutaneous once a week  Vitamin B12 1000 mcg oral tablet: 1 tab(s) orally once a day   aspirin 81 mg oral delayed release tablet: 1 tab(s) orally once a day  atorvastatin 20 mg oral tablet: 1 tab(s) orally once a day  calcium acetate 667 mg oral tablet: 2 tab(s) orally 3 times a day (with meals)  carvedilol 6.25 mg oral tablet: 1 tab(s) orally 2 times a day  folic acid 1 mg oral tablet: 1 tab(s) orally once a day  gabapentin 300 mg oral capsule: 1 cap(s) orally once a day  hydrALAZINE 25 mg oral tablet: 1 tab(s) orally 3 times a day  Lanbebeus Solostar Pen 100 units/mL subcutaneous solution: 10 unit(s) subcutaneous once a day (at bedtime)  levothyroxine 75 mcg (0.075 mg) oral tablet: 1 tab(s) orally once a day  loratadine 10 mg oral tablet: 1 tab(s) orally once a day  losartan 25 mg oral tablet: 1 tab(s) orally once a day  NovoLOG Mix 70/30 subcutaneous suspension: 20 unit(s) subcutaneous once a day  pantoprazole 40 mg oral delayed release tablet: 1 tab(s) orally once a day (before a meal)  Trulicity Pen: subcutaneous once a week  Vitamin B12 1000 mcg oral tablet: 1 tab(s) orally once a day

## 2022-05-14 NOTE — PROGRESS NOTE ADULT - REASON FOR ADMISSION
progressive SOB

## 2022-05-14 NOTE — DISCHARGE NOTE PROVIDER - CARE PROVIDERS DIRECT ADDRESSES
[email or web address]

## 2022-05-14 NOTE — PROGRESS NOTE ADULT - PROVIDER SPECIALTY LIST ADULT
Cardiology
Cardiology
Nephrology
Vascular Surgery
Vascular Surgery
Cardiology
Hepatology
Internal Medicine
Nephrology
Hepatology
Internal Medicine
Nephrology
Nephrology
Surgery
Vascular Surgery
Cardiology
Internal Medicine
Internal Medicine
Nephrology
Vascular Surgery
Internal Medicine

## 2022-05-14 NOTE — PROGRESS NOTE ADULT - NSPROGADDITIONALINFOA_GEN_ALL_CORE
I reviewed the overnight course of events on the unit, re-confirming the patient history. I discussed the care with the patient and their family. The plan of care was discussed with the ACP team and modifications were made to the notation where appropriate. Differential diagnosis and plan of care discussed with patient after the evaluation. Advanced care planning was discussed with patient and family.  Advanced care planning forms were reviewed and discussed.  Risks, benefits and alternatives of cardiac procedures were discussed in detail and all questions were answered. 35 minutes spent on total encounter of which more than fifty percent of the encounter was spent counseling and/or coordinating care by the attending physician.
d/w pt and PA.    - [facility] K[facility] ([facility])  - [phone]
d/w pt and RN.  d/w Card Dr. Prado at bedside.    - Dr. KATHY Quintanilla ([facility])  - [phone]
d/w pt and PA.  discussed with the daughter Yin, primary caregiver. (Lives in the same house)  Explained and updated her clinical status.  Emphasized the importance of following up with "Liver specialist" and "[facility]" given cirrhosis and hep B+ status.   All questions answered.    DC planning tomorrow after HD.     - Dr. KATHY Quintanilla ([facility])  - [phone]

## 2022-05-14 NOTE — DISCHARGE NOTE PROVIDER - HOSPITAL COURSE
67 yo female with PMHx HTN, DM, HLD, CKD, hypothyroidism present with progressive SOB over last 3 days with LE edema, abd bloating and periorbital edema c/w ESRD with fluid overload.  Patient initiated on HD this admission. Initially was dialyzed through a shiley catheter which was exchanged for a tunneled catheter. Patient is s/p LUE AVF creation by vascular on 5/10/22. Noted to have active hepatitis B infection likely immune control stage versus immune active. Hepatology followed and recommends initiation of anti-viral therapy outpatient in addition to outpatient EGD and MR elastography or fibroscan. Liver US inpatient notable for cirrhosis.     Patient seen and evaluated. Reviewed discharge medications with patient and attending. All new medications requiring new prescriptions were sent to the pharmacy of patient's choice. Reviewed need for prescription for previous home medications and new prescriptions sent if requested. Medically cleared/stable for discharge as per  ___________  with appropriate follow up. Patient understands and agrees with plan of care.      67 yo female with PMHx HTN, DM, HLD, CKD, hypothyroidism present with progressive SOB over last 3 days with LE edema, abd bloating and periorbital edema c/w ESRD with fluid overload.  Patient initiated on HD this admission. Initially was dialyzed through a shiley catheter which was exchanged for a tunneled catheter. Patient is s/p LUE AVF creation by vascular on 5/10/22. Noted to have active hepatitis B infection likely immune control stage versus immune active. Hepatology followed and recommends initiation of anti-viral therapy outpatient in addition to outpatient EGD and MR elastography or fibroscan. Liver US inpatient notable for cirrhosis.     Patient seen and evaluated. Reviewed discharge medications with patient and attending. All new medications requiring new prescriptions were sent to the pharmacy of patient's choice. Reviewed need for prescription for previous home medications and new prescriptions sent if requested. Medically cleared/stable for discharge as per Dr. Quintanilla on 5/14/22 with appropriate follow up. Patient understands and agrees with plan of care.      65 yo female with PMHx HTN, DM, HLD, CKD, hypothyroidism present with progressive SOB over last 3 days with LE edema, abd bloating and periorbital edema c/w ESRD with fluid overload.  Patient initiated on HD this admission. Initially was dialyzed through a shiley catheter which was exchanged for a tunneled catheter. Patient is s/p LUE AVF creation by vascular on 5/10/22. Noted to have active hepatitis B infection likely immune control stage versus immune active. Hepatology followed and recommends initiation of anti-viral therapy outpatient in addition to outpatient EGD and MR elastography or fibroscan. Liver US inpatient notable for cirrhosis.     Patient seen and evaluated. Reviewed discharge medications with patient and attending. All new medications requiring new prescriptions were sent to the pharmacy of patient's choice. Reviewed need for prescription for previous home medications and new prescriptions sent if requested. Medically cleared/stable for discharge as per Dr. Quintanilla on 5/14/22 with appropriate follow up. Patient understands and agrees with plan of care.     Attending Addendum:  Patient seen and examined by me on the discharge day. The daughter at bedside to help translate.  Medications reviewed. All questions answered in details. Follow up plan explained.   More than 30 mins were spent evaluating patient and coordinating care for discharge.  d/w PA. outpt follow up plans reviewed.  Next dialysis reviewed.

## 2022-05-14 NOTE — PROGRESS NOTE ADULT - ASSESSMENT
66F PMH CKD 5 not on hd, HTN, HLD, DM2, hypothyroid recent hospital admission Mar 2022 with plans for starting outpt dialysis presenting with fluid overload, worsen renal function, need to initiate dialysis,    CKD stage 5 now on HD ---> ESRD on HD 5/3/2022  now with worsen renal function, mild uremic symptom and fluid overload  initiated HD 5/3   s/p shiley 5/3 with IR  Permacath placement on 5/12 and was HD with no issues.   AVF left upper arm created on 5/10/2022  will continue TTS schedule  HD today  consent in chart.   - Monitor BMP   - Avoid nephrotoxics, NSAIDS RCA  pt accepted to [facility] T[facility] schedule. dc planning post hd today    Fluid overload  sec to renal failure  UF with HD as tolerated  f/u cardio    HTN   controlled  continue current meds.   monitor BP       anemia  iron sat 15%  will start iron with hd  epo with HD   monitor  transfuse to keep hb>7    hyponatremia  likely fluid overload  free water restriction <1L/day    Hyperphosphatemia  continue  binders  low PO4 diet  monitor    hypocalcemia now corrected.   optimal PTH for ckd  improved  monitor    hypokalemia resolved  Monitor  hd with high k bath    Hepatitis B  known  per daughter did not follow up with hepatology  +hep b PCR  f/u hepatology

## 2022-05-14 NOTE — PROGRESS NOTE ADULT - SUBJECTIVE AND OBJECTIVE BOX
Gary Morrison MD  Interventional Cardiology / [facility] Heart Failure and Cardiac Transplant Specialist  [place] Office : [address] [place] [address] [place] [address]  Tel:   [place] Office : [address] [facility] [place] [address]  Tel: [phone]       Pt is lying in bed comfortable not in distress, no chest pains no SOB no palpitations  	  MEDICATIONS:  aspirin enteric coated 81 milliGRAM(s) Oral daily  carvedilol 6.25 milliGRAM(s) Oral every 12 hours  heparin   Injectable 5000 Unit(s) SubCutaneous every 12 hours  hydrALAZINE 25 milliGRAM(s) Oral three times a day  losartan 25 milliGRAM(s) Oral daily      loratadine 10 milliGRAM(s) Oral daily    acetaminophen     Tablet .. 650 milliGRAM(s) Oral every 6 hours PRN  gabapentin 300 milliGRAM(s) Oral daily    pantoprazole    Tablet 40 milliGRAM(s) Oral before breakfast  polyethylene glycol 3350 17 Gram(s) Oral daily  senna 2 Tablet(s) Oral at bedtime    atorvastatin 20 milliGRAM(s) Oral at bedtime  dextrose 50% Injectable 25 Gram(s) IV Push once  dextrose 50% Injectable 12.5 Gram(s) IV Push once  dextrose 50% Injectable 25 Gram(s) IV Push once  dextrose Oral Gel 15 Gram(s) Oral once PRN  glucagon  Injectable 1 milliGRAM(s) IntraMuscular once  insulin glargine Injectable (LANTUS) 10 Unit(s) SubCutaneous at bedtime  insulin lispro (ADMELOG) corrective regimen sliding scale   SubCutaneous three times a day before meals  insulin lispro (ADMELOG) corrective regimen sliding scale   SubCutaneous at bedtime  levothyroxine 75 MICROGram(s) Oral daily    calcium acetate 1334 milliGRAM(s) Oral three times a day with meals  chlorhexidine 2% Cloths 1 Application(s) Topical daily  chlorhexidine 2% Cloths 1 Application(s) Topical daily  chlorhexidine 4% Liquid 1 Application(s) Topical <User Schedule>  cyanocobalamin 1000 MICROGram(s) Oral daily  dextrose 5%. 1000 milliLiter(s) IV Continuous <Continuous>  dextrose 5%. 1000 milliLiter(s) IV Continuous <Continuous>  epoetin denise-epbx (RETACRIT) Injectable 97347 Unit(s) IV Push <User Schedule>  folic acid 1 milliGRAM(s) Oral daily  iron sucrose Injectable 100 milliGRAM(s) IV Push <User Schedule>  sodium chloride 0.9% lock flush 10 milliLiter(s) IV Push every 1 hour PRN      PAST MEDICAL/SURGICAL HISTORY  PAST MEDICAL & SURGICAL HISTORY:  HTN (hypertension)      HLD (hyperlipidemia)          SOCIAL HISTORY: Substance Use (street drugs): ( x ) never used  (  ) other:    FAMILY HISTORY:      REVIEW OF SYSTEMS:  CONSTITUTIONAL: No fever, weight loss, or fatigue  EYES: No eye pain, visual disturbances, or discharge  ENMT:  No difficulty hearing, tinnitus, vertigo; No sinus or throat pain  BREASTS: No pain, masses, or nipple discharge  GASTROINTESTINAL: No abdominal or epigastric pain. No nausea, vomiting, or hematemesis; No diarrhea or constipation. No melena or hematochezia.  GENITOURINARY: No dysuria, frequency, hematuria, or incontinence  NEUROLOGICAL: No headaches, memory loss, loss of strength, numbness, or tremors  ENDOCRINE: No heat or cold intolerance; No hair loss  MUSCULOSKELETAL: No joint pain or swelling; No muscle, back, or extremity pain  PSYCHIATRIC: No depression, anxiety, mood swings, or difficulty sleeping  HEME/LYMPH: No easy bruising, or bleeding gums       PHYSICAL EXAM:  T(C): 36.9 (05-14-22 @ 15:30), Max: 36.9 (05-14-22 @ 13:10)  HR: 76 (05-14-22 @ 15:30) (62 - 76)  BP: 117/60 (05-14-22 @ 15:30) (116/61 - 137/55)  RR: 18 (05-14-22 @ 15:30) (18 - 18)  SpO2: 100% (05-14-22 @ 15:30) (100% - 100%)  Wt(kg): --  I&O's Summary    14 May 2022 07:01  -  14 May 2022 22:59  --------------------------------------------------------  IN: 500 mL / OUT: 2000 mL / NET: -1500 mL          GENERAL: NAD  EYES:   PERRLA   ENMT:   Moist mucous membranes, Good dentition, No lesions  Cardiovascular: Normal S1 S2, No JVD, No murmurs, No edema  Respiratory: Lungs clear to auscultation	  Gastrointestinal:  Soft, Non-tender, + BS	  Extremities: no edema                                    9.3    12.27 )-----------( 179      ( 14 May 2022 10:27 )             28.5     05-14    138  |  98  |  11  ----------------------------<  131<H>  3.1<L>   |  29  |  1.23    Ca    8.5      14 May 2022 10:27  Phos  1.4     05-14  Mg     2.00     05-14      proBNP:   Lipid Profile:   HgA1c:   TSH:     Consultant(s) Notes Reviewed:  [x ] YES  [ ] NO    Care Discussed with Consultants/Other Providers [ x] YES  [ ] NO    Imaging Personally Reviewed independently:  [x] YES  [ ] NO    All labs, radiologic studies, vitals, orders and medications list reviewed. Patient is seen and examined at bedside. Case discussed with medical team.

## 2022-05-17 LAB
IC SERPL C1Q BIND-ACNC: <1.2 UG EQ/ML — SIGNIFICANT CHANGE UP
IC SERPL RAJI CELL-ACNC: 8.2 UG EQ/ML — SIGNIFICANT CHANGE UP

## 2022-05-21 LAB
HDV AB SER-ACNC: NEGATIVE — SIGNIFICANT CHANGE UP
HDV IGM SER QL IA: SIGNIFICANT CHANGE UP

## 2022-06-22 ENCOUNTER — APPOINTMENT (OUTPATIENT)
Dept: VASCULAR SURGERY | Facility: CLINIC | Age: 67
End: 2022-06-22

## 2022-06-22 ENCOUNTER — APPOINTMENT (OUTPATIENT)
Dept: VASCULAR SURGERY | Facility: CLINIC | Age: 67
End: 2022-06-22
Payer: MEDICAID

## 2022-06-22 VITALS
BODY MASS INDEX: 33.56 KG/M2 | WEIGHT: 145 LBS | HEART RATE: 69 BPM | SYSTOLIC BLOOD PRESSURE: 176 MMHG | HEIGHT: 55 IN | DIASTOLIC BLOOD PRESSURE: 73 MMHG | TEMPERATURE: 97.6 F

## 2022-06-22 DIAGNOSIS — Z00.00 ENCOUNTER FOR GENERAL ADULT MEDICAL EXAMINATION W/OUT ABNORMAL FINDINGS: ICD-10-CM

## 2022-06-22 PROCEDURE — 99024 POSTOP FOLLOW-UP VISIT: CPT

## 2022-06-22 PROCEDURE — 93990 DOPPLER FLOW TESTING: CPT

## 2022-06-22 NOTE — REASON FOR VISIT
[de-identified] : left radiocephalic AVF [de-identified] : 05/03 [de-identified] : Patient is 1.5months post-op after AVF creation she is feeling well. Denies any complaint in the LUE, no pain, numbness, weakness. Receiving HD through right IJ TDC. Doing well

## 2022-06-22 NOTE — DISCUSSION/SUMMARY
[FreeTextEntry1] : A/P: AVF is maturing well, not ready for use yet. \par Duplex: diameter 4.2 \par will plan for BAM\par risks and benefits of the procedure were discussed and pt. agrees to proceed\par \par

## 2022-07-08 ENCOUNTER — OUTPATIENT (OUTPATIENT)
Dept: OUTPATIENT SERVICES | Facility: HOSPITAL | Age: 67
LOS: 1 days | Discharge: HOME | End: 2022-07-08

## 2022-07-08 ENCOUNTER — APPOINTMENT (OUTPATIENT)
Dept: VASCULAR SURGERY | Facility: HOSPITAL | Age: 67
End: 2022-07-08

## 2022-07-08 DIAGNOSIS — I77.0 ARTERIOVENOUS FISTULA, ACQUIRED: Chronic | ICD-10-CM

## 2022-07-08 PROCEDURE — 99152 MOD SED SAME PHYS/QHP 5/>YRS: CPT

## 2022-07-08 PROCEDURE — 36902 INTRO CATH DIALYSIS CIRCUIT: CPT | Mod: LT,58

## 2022-07-08 PROCEDURE — 76937 US GUIDE VASCULAR ACCESS: CPT | Mod: 26

## 2022-07-08 RX ORDER — ENOXAPARIN SODIUM 100 MG/ML
10 INJECTION SUBCUTANEOUS
Qty: 0 | Refills: 0 | DISCHARGE

## 2022-07-08 RX ORDER — GABAPENTIN 400 MG/1
1 CAPSULE ORAL
Qty: 0 | Refills: 0 | DISCHARGE

## 2022-07-08 RX ORDER — DULAGLUTIDE 4.5 MG/.5ML
0 INJECTION, SOLUTION SUBCUTANEOUS
Qty: 0 | Refills: 0 | DISCHARGE

## 2022-07-08 RX ORDER — CARVEDILOL PHOSPHATE 80 MG/1
1 CAPSULE, EXTENDED RELEASE ORAL
Qty: 0 | Refills: 0 | DISCHARGE

## 2022-07-08 RX ORDER — SODIUM CHLORIDE 9 MG/ML
3 INJECTION INTRAMUSCULAR; INTRAVENOUS; SUBCUTANEOUS EVERY 8 HOURS
Refills: 0 | Status: DISCONTINUED | OUTPATIENT
Start: 2022-07-08 | End: 2022-07-22

## 2022-07-08 RX ORDER — HYDRALAZINE HCL 50 MG
25 TABLET ORAL ONCE
Refills: 0 | Status: COMPLETED | OUTPATIENT
Start: 2022-07-08 | End: 2022-07-08

## 2022-07-08 RX ORDER — LORATADINE 10 MG/1
1 TABLET ORAL
Qty: 0 | Refills: 0 | DISCHARGE

## 2022-07-08 RX ADMIN — Medication 25 MILLIGRAM(S): at 09:39

## 2022-07-08 NOTE — H&P CARDIOLOGY - HISTORY OF PRESENT ILLNESS
Nhi  ID: [id number] and name: Samira Lama    66 y/o F with PMH of HTN, HLD, DM type II, ESRD(on HD Tues/Thurs/Sat), Hypothyroidism presented to [facility] for AV fistulogram. As per the daughter at bedside her mother has been in her usual state of health and endorsed of mild pain at the site but otherwise denied any weakness or numbness/tingling. Patient otherwise denied any CP, SOB, fevers, chills, N/V/D/C, abdominal pain, dysuria, melena, hematochezia, recent travel, sick contact, cough, body aches, pleuritic or positional chest pain. Patient saw Dr. Culver on 06/22/22 and will need BAM of the left radiocephalic AVF.     COVID PCR not detected on 07/05/22

## 2022-07-08 NOTE — H&P CARDIOLOGY - NSICDXPASTMEDICALHX_GEN_ALL_CORE_FT
PAST MEDICAL HISTORY:  Diabetes mellitus, type II     ESRD on dialysis     HLD (hyperlipidemia)     HTN (hypertension)

## 2022-07-08 NOTE — H&P CARDIOLOGY - RS GEN PE MLT RESP DETAILS PC
normal/airway patent/breath sounds equal/clear to auscultation bilaterally/no chest wall tenderness/no intercostal retractions/no rales/no rhonchi/no wheezes

## 2022-08-03 PROBLEM — N18.6 END STAGE RENAL DISEASE: Chronic | Status: ACTIVE | Noted: 2022-07-08

## 2022-08-03 PROBLEM — E11.9 TYPE 2 DIABETES MELLITUS WITHOUT COMPLICATIONS: Chronic | Status: ACTIVE | Noted: 2022-07-08

## 2022-08-24 ENCOUNTER — APPOINTMENT (OUTPATIENT)
Dept: VASCULAR SURGERY | Facility: CLINIC | Age: 67
End: 2022-08-24

## 2022-08-24 VITALS
HEIGHT: 55 IN | HEART RATE: 67 BPM | BODY MASS INDEX: 33.56 KG/M2 | SYSTOLIC BLOOD PRESSURE: 134 MMHG | TEMPERATURE: 97.8 F | WEIGHT: 145 LBS | DIASTOLIC BLOOD PRESSURE: 72 MMHG

## 2022-08-24 PROCEDURE — 36589 REMOVAL TUNNELED CV CATH: CPT | Mod: RT

## 2022-11-06 NOTE — ED PROVIDER NOTE - INTERNATIONAL TRAVEL
You may start using over the counter silicone gels in one month to minimize scarring. Use daily sunscreen with SPF of at least 30 to minimize scarring as well, reapplying every 2-3 hours when outside.
No

## 2023-02-28 DIAGNOSIS — Z20.822 ENCOUNTER FOR PREPROCEDURAL LABORATORY EXAMINATION: ICD-10-CM

## 2023-02-28 DIAGNOSIS — Z01.812 ENCOUNTER FOR PREPROCEDURAL LABORATORY EXAMINATION: ICD-10-CM

## 2023-03-03 ENCOUNTER — APPOINTMENT (OUTPATIENT)
Dept: VASCULAR SURGERY | Facility: HOSPITAL | Age: 68
End: 2023-03-03

## 2023-03-03 ENCOUNTER — OUTPATIENT (OUTPATIENT)
Dept: OUTPATIENT SERVICES | Facility: HOSPITAL | Age: 68
LOS: 1 days | Discharge: HOME | End: 2023-03-03
Payer: MEDICAID

## 2023-03-03 DIAGNOSIS — N18.6 END STAGE RENAL DISEASE: ICD-10-CM

## 2023-03-03 DIAGNOSIS — I77.0 ARTERIOVENOUS FISTULA, ACQUIRED: Chronic | ICD-10-CM

## 2023-03-03 LAB — GLUCOSE SERPL-MCNC: 169 MG/DL — HIGH (ref 70–99)

## 2023-03-03 PROCEDURE — 99152 MOD SED SAME PHYS/QHP 5/>YRS: CPT

## 2023-03-03 PROCEDURE — 36902 INTRO CATH DIALYSIS CIRCUIT: CPT | Mod: LT

## 2023-03-03 PROCEDURE — 76937 US GUIDE VASCULAR ACCESS: CPT | Mod: 26

## 2023-03-03 RX ORDER — GLUCAGON INJECTION, SOLUTION 0.5 MG/.1ML
1 INJECTION, SOLUTION SUBCUTANEOUS ONCE
Refills: 0 | Status: DISCONTINUED | OUTPATIENT
Start: 2023-03-03 | End: 2023-03-17

## 2023-03-03 RX ORDER — SODIUM CHLORIDE 9 MG/ML
3 INJECTION INTRAMUSCULAR; INTRAVENOUS; SUBCUTANEOUS EVERY 8 HOURS
Refills: 0 | Status: DISCONTINUED | OUTPATIENT
Start: 2023-03-03 | End: 2023-03-17

## 2023-03-03 RX ORDER — CARVEDILOL PHOSPHATE 80 MG/1
1 CAPSULE, EXTENDED RELEASE ORAL
Qty: 0 | Refills: 0 | DISCHARGE

## 2023-03-03 RX ORDER — ASPIRIN/CALCIUM CARB/MAGNESIUM 324 MG
1 TABLET ORAL
Qty: 0 | Refills: 0 | DISCHARGE

## 2023-03-03 RX ORDER — DEXTROSE 50 % IN WATER 50 %
15 SYRINGE (ML) INTRAVENOUS ONCE
Refills: 0 | Status: DISCONTINUED | OUTPATIENT
Start: 2023-03-03 | End: 2023-03-17

## 2023-03-03 RX ORDER — FOLIC ACID 0.8 MG
1 TABLET ORAL
Qty: 0 | Refills: 0 | DISCHARGE

## 2023-03-03 RX ORDER — FERROUS GLUCONATE 100 %
1 POWDER (GRAM) MISCELLANEOUS
Qty: 0 | Refills: 0 | DISCHARGE

## 2023-03-03 RX ORDER — DEXTROSE 50 % IN WATER 50 %
25 SYRINGE (ML) INTRAVENOUS ONCE
Refills: 0 | Status: DISCONTINUED | OUTPATIENT
Start: 2023-03-03 | End: 2023-03-17

## 2023-03-03 RX ORDER — ATORVASTATIN CALCIUM 80 MG/1
1 TABLET, FILM COATED ORAL
Qty: 0 | Refills: 0 | DISCHARGE

## 2023-03-03 RX ORDER — PREGABALIN 225 MG/1
1 CAPSULE ORAL
Qty: 0 | Refills: 0 | DISCHARGE

## 2023-03-03 RX ORDER — INSULIN GLARGINE 100 [IU]/ML
12 INJECTION, SOLUTION SUBCUTANEOUS
Qty: 0 | Refills: 0 | DISCHARGE

## 2023-03-03 RX ORDER — INSULIN GLARGINE 100 [IU]/ML
20 INJECTION, SOLUTION SUBCUTANEOUS
Qty: 0 | Refills: 0 | DISCHARGE

## 2023-03-03 RX ORDER — DEXTROSE 50 % IN WATER 50 %
12.5 SYRINGE (ML) INTRAVENOUS ONCE
Refills: 0 | Status: DISCONTINUED | OUTPATIENT
Start: 2023-03-03 | End: 2023-03-17

## 2023-03-03 RX ORDER — LEVOTHYROXINE SODIUM 125 MCG
1 TABLET ORAL
Qty: 0 | Refills: 0 | DISCHARGE

## 2023-03-03 RX ORDER — INSULIN ASPART 100 [IU]/ML
20 INJECTION, SUSPENSION SUBCUTANEOUS
Qty: 0 | Refills: 0 | DISCHARGE

## 2023-03-03 RX ORDER — SODIUM BICARBONATE 1 MEQ/ML
1 SYRINGE (ML) INTRAVENOUS
Qty: 0 | Refills: 0 | DISCHARGE

## 2023-03-03 RX ORDER — DEXTROSE 50 % IN WATER 50 %
50 SYRINGE (ML) INTRAVENOUS ONCE
Refills: 0 | Status: COMPLETED | OUTPATIENT
Start: 2023-03-03 | End: 2023-03-03

## 2023-03-03 RX ADMIN — Medication 50 MILLILITER(S): at 07:27

## 2023-03-03 NOTE — H&P CARDIOLOGY - COMMENTS
glucose 33 and repeat 40 upon admission, patient without complaints, sitting up at bedside awake, alert and responding appropriately; patient did take her Novolog 70-30 this AM; will give full amp D50 stat and monitor

## 2023-03-03 NOTE — H&P CARDIOLOGY - SOURCE
- fair historian via language line # and medical records/Patient - refers to daughter to translate and answer questions; daughter translating as per patient request via language line #[id number] and medical records/Patient

## 2023-03-03 NOTE — H&P CARDIOLOGY - HISTORY OF PRESENT ILLNESS
67 year old Armenian speaking female with HTN, hyperlipidemia, DM type 2, ESRD on HD via left radiocephalic AVF with   Presents for Left AVF fistulogram with possible angioplasty.     COVID PCR not detected on ?????    Denies any complaint in the LUE, no pain, numbness, weakness. Receiving HD through right IJ TDC. Doing well. 67 year old Slovak speaking female with HTN, hyperlipidemia, DM type 2, ESRD on HD via left radiocephalic AVF with recent difficulty with cannulation of the left AVF the past 1 week at dialysis. Still able to use per daughter but with difficulty.   Presents for Left AVF fistulogram with possible angioplasty.     COVID PCR not detected on ?????    Denies any complaint in the LUE, no pain, numbness, weakness. Receiving HD through right IJ TDC. Doing well.

## 2024-01-29 NOTE — ED ADULT TRIAGE NOTE - PAIN: PRESENCE, MLM
Endocrinology Clinic Note    Patient: Margret Lee   Age: 59year old   Sex: female  Clinic: [facility] - Endocrinology Clinic       Date & Time: 01/29/24    Reason for visit: Type 1 diabetes mellitus    HPI: Margret Lee is a 59year old female with a significant PMHx of type 1 diabetes mellitus, hypothyroidism, hypertension, gastroparesis, GERD, irritable bowel syndrome, vitamin-D deficiency, osteoporosis, hyperlipidemia, cataracts who presents for follow up of Type 1 diabetes mellitus. She had COVID in mid September 2023. #Type 1 Diabetes Mellitus     Diagnosis: Age 11  FHx: Father = T2DM, sisters x 2 = T1DM, mother = T1DM  DKA/Hospitalizations: Denies     HbA1c: 7.1% in 1/2024. 6.9% in 10/2023. 7.1% in 7/2023. 6.7% in 3/2023. 7.7% in 12/2022. 8.5% in 8/2022. 8.1% in 4/2022, 9.1% in 1/2022, 8.5% in 2/2021     Pump  Type: Tandem T-Slim  Insulin:  Humalog  TDD: 77.59u (20% bolus, 80% basal)   % time in auto: 95%  Basal settings:              12AM 1.75   2AM 1.8   4AM 1.6             6AM 1.7   9AM 1.8             12PM 2.1             2PM 2.4   5PM 2.3   8PM 2.5  Insulin/carbohydrate ratio:              12AM 14             4AM 15   7AM 10   12PM 8             5PM 6             9PM 10  Insulin sensitivity factor:              12AM 45             12PM 40   8PM 35             10PM 40  Target blood glucose:              12AM 130             6AM 100             8PM 120              10PM 130  AIT: 5 hours     CGM: Dexcom G6  50% in target, 0% low, 26% high, 24% very high. Average glucose 198. CGM active 97% of the time. CV 40%. GMI 8.1%. Trends: Large peaks with meals. Many missed boluses before meals. Hypoglycemia: Rare  Hypoglycemia awareness: Sxs under 70. Glucagon pen: Denies  Medical alert identification: -     Microvascular Complications -  Diabetic retinopathy: Last eye exam 5/2023 - no DR. S/p right corneal transplant. Diabetic nephropathy: Cr 0.75, eGFR 89 in 10/2023. Urine microalbumin/Cr ratio normal in 10/2023. Peripheral neuropathy: Denies  Gastroparesis: Present  Macrovascular Complications: Denies hx of MI or CVA. High coronary artery calcium score. Nuclear medicine stress test in Jan 2023 without evidence of ischemia. Doing Weight Watchers. Weight: 88.1kg, BMI 31.36  Blood pressure: /74 in clinic today. On metoprolol 25 mg b.i.d., lisinopril 20 mg daily, hydrochlorothiazide 12.5 mg 1 day and 25 mg the next day. Lipids: TChol 159, LDL 77, trigs 148 in 12/2023. On rosuvastatin 20 mg daily. Celiac screen: Negative in 2/2021  Vitamin D: 28.4 in 10/2023. On OTC Vit D3 2000IU daily. Vitamin B12: 651 in 2/2021     #Hypothyroidism  - TSH 5.509, FT4 1.0 in 1/2024  - On levothryoxine 88 mcg daily. #Osteoporosis  - Reclast 12/2014, 1/2016, 1/2017, 3/2019. Now on drug holiday. - DEXA 7/29/2022 (GE PRODIGY P10 ADVANCE FULL) [place]   Lumbar spine (L1-L2): 1.072 g/cm2, T-score:  -0.8    Right femoral neck: 0.722 g/cm2, T-score: -2.3   - DEXA 7/2020             Lumbar spine: (L1-L4):  1.083 g/cm2, T-score:  -0.9              Lowest femoral: Right femoral neck 0.737 g/cm2, T-score: -2.2     Past Medical History:  Past Medical History:   Diagnosis Date    Abnormal stress electrocardiogram test using treadmill 2/14/2017    and esophagitis 11/3/2014    On EGD 3/2011    Colon cancer screening 6/7/2021    COLONOSCOPY DONE 6/7/2021, REVEALED: Normal mucosa in the entire colon and distal 15 cm of terminal ileum. Few, early, left-sided diverticuli. Mild internal hemorrhoids. Screening colonoscopy in 10 years.     Cortical cataract of both eyes     Diabetes mellitus type I (CMD)     HAS INSULIN PUMP    Gastroparesis     GERD (gastroesophageal reflux disease)     HLD (hyperlipidemia)     HTN (hypertension)     IBS (irritable bowel syndrome)     Intestinal bacterial overgrowth 6/7/2015    Plantar fasciitis     Thyroid condition        Family History:  Family History Problem Relation Age of Onset    Diabetes Mother     Diabetes Father         on insulin    Hypertension Father     Hyperlipidemia Father     Heart disease Father         open heart /bypass    Cancer Father         bladder and lump on neck    Diabetes Sister     Diabetes Sister     Crohn's Disease Brother     Cancer Maternal Aunt        Social History:  Social History     Tobacco Use    Smoking status: Never     Passive exposure: Never    Smokeless tobacco: Never    Tobacco comments:     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 90 tablet 3    DISPENSE DOMPERIDONE 10 MG tablets-  Take 2 tablets by mouth two times daily. 400 capsule 3    Continuous Blood Gluc Transmit (Dexcom G6 Transmitter) Misc Change transmitter every 90 days 1 each 3    Continuous Blood Gluc Sensor (Dexcom G6 Sensor) Misc Change sensor every 10 days 9 each 3    hydroCHLOROthiazide (HYDRODIURIL) 12.5 MG tablet Take 1 tablet by mouth every 48 hours Alternating with 2 tablets every 48 hours on opposite days. 135 tablet 3    meloxicam (MOBIC) 15 MG tablet Take Half to 1 tablets by mouth daily. 30 tablet 1    prednisoLONE acetate (PRED FORTE) 1 % ophthalmic suspension Place 1 drop into right eye once daily 3 TIMES daily for 06 days, 2 times daily for 90 days, then 1 drop a day forever. 5 mL 3    blood glucose test strip TEST 5 TIMES PER  each 1    insulin lispro 100 UNIT/ML injectable solution Max of 100 units daily via insulin pump 90 mL 3    omeprazole (PriLOSEC) 40 MG capsule Take 1 capsule by mouth daily.  90 capsule 3    insulin glargine (Lantus) 100 UNIT/ML vial solution Inject Lantus 40 units every 24 hours as basal insulin in case of "insulin pump failure. 10 mL 0    ofloxacin (OCUFLOX) 0.3 % ophthalmic solution 1 DROP IN OPERATIVE EYE 4 TIMES DAILY STARTING AFTER YOUR PROCEDURE FOR PREVENTION OF BACTERIAL INFECTION. lisinopril (ZESTRIL) 20 MG tablet Take 1 tablet by mouth daily. 90 tablet 3    metoPROLOL tartrate (LOPRESSOR) 25 MG tablet Take 1 tablet by mouth in the morning and 1 tablet in the evening. 180 tablet 3    rosuvastatin (CRESTOR) 20 MG tablet Take 1 tablet by mouth daily. 90 tablet 3    Insulin Syringe-Needle U-100 31G X 15/64"" 0.5 ML Misc Inject insulin as needed 100 each 3    Blood Glucose Monitoring Suppl (ONE TOUCH ULTRA 2) w/Device Kit Use to test blood sugar five times per day as directed. DX:E10.9 1 kit 0    DISPENSE Cumin 1 tablet daily      Cholecalciferol (VITAMIN D) 2000 units capsule Take 1 capsule by mouth daily. 90 capsule 0    aspirin 81 MG tablet Take 81 mg by mouth daily. DISPENSE Blood pressure monitor 1 kit 0     No current facility-administered medications for this visit. Facility-Administered Medications Ordered in Other Visits   Medication Dose Route Frequency Provider Last Rate Last Admin    perflutren lipid microspheres (DEFINITY) injection 2 mL  2 mL Intravenous Once Yovany Nazario MD           Allergies:  ALLERGIES:   Allergen Reactions    Percocet [Oxycodone-Acetaminophen] NAUSEA     Percocet 5/325      Vicodin [Hydrocodone-Acetaminophen] NAUSEA       Physical Exam:    Vital Signs:  Vitals:    01/29/24 1218   BP: 120/74   BP Location: LUE - Left upper extremity   Patient Position: Sitting   Cuff Size: Regular   Pulse: 72   Weight: 88.1 kg (194 lb 4.8 oz)     Body mass index is 31.36 kg/mÂ². Constitutional: Well-developed, cooperative, in no acute distress. Eyes: Pupils equal.  Ears, Nose, Mouth and Throat: Normal hearing. Neck: Neck supple, thyroid not enlarged. Respiratory: Normal respiratory effort, normal breath sounds heard bilaterally.   Cardiovascular: Normal rate, regular " rhythm, no murmurs, no pedal edema. Gastrointestinal: Abdomen soft and non-tender. Musculoskeletal: Normal gait. Integumentary: No dry skin or rashes. Neurologic: No hand tremors, deep tendon reflexes 2+ in bilateral extremities. Psychiatric: Normal mood and affect. Laboratory, Radiographic, Cytopathologic Data  Reviewed/ordered clinical lab tests    Hemoglobin A1C (%)   Date Value   01/24/2024 7.1 (H)       TSH (mcUnits/mL)   Date Value   01/24/2024 5.509 (H)       Vitamin D, 25-Hydroxy (ng/mL)   Date Value   10/16/2023 28.4 (L)       Assessment & Plan:    Problem List:  Type 1 diabetes mellitus with hyperglycemia (CMD)  (primary encounter diagnosis)  Hypothyroidism due to Hashimoto's thyroiditis  Essential hypertension  Mixed hyperlipidemia  Osteoporosis, post-menopausal  Vitamin D deficiency  Insulin pump titration  Encounter for long-term (current) use of insulin (CMD)    Recommendations:  1. T1DM/HTN/HLD: Controlled. Complication by hyperglycemia, gastroparesis. - Adjust pump settings as follows:   Basal settings:              12AM 1.75   2AM 1.8   4AM 1.6             6AM 1.7   9AM 1.8             12PM 2.1             2PM 2.4   5PM 2.3   8PM 2.5  Insulin/carbohydrate ratio:              12AM 14             4AM 15   7AM 10   9AM 9   12PM 7             5PM 6             9PM 10  Insulin sensitivity factor:              12AM 45             12PM 40   2PM 35             10PM 40  Target blood glucose:              12AM 130             6AM 100             8PM 120              10PM 130  AIT: 5 hours  - Trun on exercise mode prior to [employer]    - Work on accurate carb counting. Handout give. Work on not missing any boluses. - Advised to check blood sugar at least 4x daily and keep BG record to bring to clinic.  CGM remains medically necessary.    - Discussed lifestyle modifications including weight loss, healthy eating, portion control, regular exercise  - UTD for eye exam  - Last foot exam performed in this clinic: Pt declined previously as she states she was just seen by the podiatrist  - Continue the following medications for hypertension: metoprolol 25 mg b.i.d., lisinopril 20 mg daily, hydrochlorothiazide 12.5 mg 1 day and 25 mg the next day. - Continue the following medications for hyperlipidemia: rosuvastatin 20 mg daily.  - Patient given the opportunity to ask questions  2. Hypothyroidism:  - Change levothyroxine 88mcg to 8 tabs per week until supply runs out then change to 100mcg daily. Labs prior to follow up.    3. Osteoporosis:  - Continue drug holiday  - Due for repeat DEXA in July 2024    Orders Placed This Encounter    GLUCOSE MONITORING MINIMUM 72 HRS INTERP    Thyroid Stimulating Hormone    Free T4    Glycohemoglobin    levothyroxine 100 MCG tablet         RTC: 3 months     Paul Moreno MD  Endocrinology, Diabetes & Metabolism    MEDICAL CENTER [facility] complains of pain/discomfort

## 2024-03-20 NOTE — PATIENT PROFILE ADULT - FUNCTIONAL ASSESSMENT - DAILY ACTIVITY ASSESSMENT TYPE
Chief Complaint       Otalgia (Right ear pain off and on since January, left ear started yesterday)    History of Present Illness   Source of history provided by:  patient.      Cynthia Dailey is a 23 y.o. old female presenting to the walk in clinic for evaluation of right ear pain for the past 5 days, left ear pain starting yesterday.  Right ear has been more noticeable than left.  Reports associated nasal congestion, rhinorrhea. Denies any discharge from the ear canal. Denies any fever, chills, CP, SOB, abdominal pain, neck stiffness, rash, or lethargy.     ROS    Unless otherwise stated in this report or unable to obtain because of the patient's clinical or mental status as evidenced by the medical record, this patients's positive and negative responses for Review of Systems, constitutional, psych, eyes, ENT, cardiovascular, respiratory, gastrointestinal, neurological, genitourinary, musculoskeletal, integument systems and systems related to the presenting problem are either stated in the preceding or were not pertinent or were negative for the symptoms and/or complaints related to the medical problem.    Physical Exam         VS:  /72   Pulse (!) 103   Temp 97.5 °F (36.4 °C) (Temporal)   Ht 1.651 m (5' 5\")   Wt (!) 151.2 kg (333 lb 6.4 oz)   SpO2 97%   BMI 55.48 kg/m²    Oxygen Saturation Interpretation: Normal.    Constitutional:  Alert, development consistent with age.  Ears:  External Ears: Normal pinna bilaterally.                 TM's & External Canals: Canals without discharge, edema or, erythema bilaterally.  Bilateral tympanic membranes with serous effusions.  Right tympanic membrane with mild erythema and bulging present.  No perforation bilaterally. No pre/post auricular or mastoid tenderness or redness.  Nose: Mild congestion of the nasal mucosa.  Throat:  Posterior pharynx without injection, exudate, or tonsillar hypertrophy.  Airway patient.  Neck:  Normal ROM.  Supple.  No  Admission

## 2025-05-23 ENCOUNTER — INPATIENT (INPATIENT)
Facility: HOSPITAL | Age: 70
LOS: 4 days | Discharge: HOME | End: 2025-05-28
Attending: INTERNAL MEDICINE | Admitting: INTERNAL MEDICINE
Payer: MEDICARE

## 2025-05-23 VITALS
HEART RATE: 76 BPM | TEMPERATURE: 98 F | SYSTOLIC BLOOD PRESSURE: 155 MMHG | OXYGEN SATURATION: 97 % | DIASTOLIC BLOOD PRESSURE: 96 MMHG | WEIGHT: 153 LBS | RESPIRATION RATE: 24 BRPM

## 2025-05-23 DIAGNOSIS — N18.6 END STAGE RENAL DISEASE: ICD-10-CM

## 2025-05-23 DIAGNOSIS — E78.5 HYPERLIPIDEMIA, UNSPECIFIED: ICD-10-CM

## 2025-05-23 DIAGNOSIS — E11.9 TYPE 2 DIABETES MELLITUS WITHOUT COMPLICATIONS: ICD-10-CM

## 2025-05-23 DIAGNOSIS — I77.0 ARTERIOVENOUS FISTULA, ACQUIRED: Chronic | ICD-10-CM

## 2025-05-23 DIAGNOSIS — E03.9 HYPOTHYROIDISM, UNSPECIFIED: ICD-10-CM

## 2025-05-23 DIAGNOSIS — I10 ESSENTIAL (PRIMARY) HYPERTENSION: ICD-10-CM

## 2025-05-23 DIAGNOSIS — J18.9 PNEUMONIA, UNSPECIFIED ORGANISM: ICD-10-CM

## 2025-05-23 DIAGNOSIS — J96.01 ACUTE RESPIRATORY FAILURE WITH HYPOXIA: ICD-10-CM

## 2025-05-23 DIAGNOSIS — Z29.9 ENCOUNTER FOR PROPHYLACTIC MEASURES, UNSPECIFIED: ICD-10-CM

## 2025-05-23 LAB
A1C WITH ESTIMATED AVERAGE GLUCOSE RESULT: 6.3 % — HIGH (ref 4–5.6)
ADD ON TEST-SPECIMEN IN LAB: SIGNIFICANT CHANGE UP
ALBUMIN SERPL ELPH-MCNC: 3.6 G/DL — SIGNIFICANT CHANGE UP (ref 3.3–5)
ALP SERPL-CCNC: 119 U/L — SIGNIFICANT CHANGE UP (ref 40–120)
ALT FLD-CCNC: 18 U/L — SIGNIFICANT CHANGE UP (ref 4–33)
ANION GAP SERPL CALC-SCNC: 14 MMOL/L — SIGNIFICANT CHANGE UP (ref 7–14)
AST SERPL-CCNC: 34 U/L — HIGH (ref 4–32)
BASOPHILS # BLD AUTO: 0.04 K/UL — SIGNIFICANT CHANGE UP (ref 0–0.2)
BASOPHILS NFR BLD AUTO: 0.3 % — SIGNIFICANT CHANGE UP (ref 0–2)
BILIRUB SERPL-MCNC: 1 MG/DL — SIGNIFICANT CHANGE UP (ref 0.2–1.2)
BUN SERPL-MCNC: 36 MG/DL — HIGH (ref 7–23)
CALCIUM SERPL-MCNC: 8.5 MG/DL — SIGNIFICANT CHANGE UP (ref 8.4–10.5)
CHLORIDE SERPL-SCNC: 97 MMOL/L — LOW (ref 98–107)
CO2 SERPL-SCNC: 25 MMOL/L — SIGNIFICANT CHANGE UP (ref 22–31)
CREAT SERPL-MCNC: 5.09 MG/DL — HIGH (ref 0.5–1.3)
EGFR: 9 ML/MIN/1.73M2 — LOW
EGFR: 9 ML/MIN/1.73M2 — LOW
EOSINOPHIL # BLD AUTO: 0.17 K/UL — SIGNIFICANT CHANGE UP (ref 0–0.5)
EOSINOPHIL NFR BLD AUTO: 1.4 % — SIGNIFICANT CHANGE UP (ref 0–6)
ESTIMATED AVERAGE GLUCOSE: 134 — SIGNIFICANT CHANGE UP
FLUAV AG NPH QL: SIGNIFICANT CHANGE UP
FLUBV AG NPH QL: SIGNIFICANT CHANGE UP
GAS PNL BLDV: SIGNIFICANT CHANGE UP
GLUCOSE BLDC GLUCOMTR-MCNC: 129 MG/DL — HIGH (ref 70–99)
GLUCOSE BLDC GLUCOMTR-MCNC: 196 MG/DL — HIGH (ref 70–99)
GLUCOSE BLDC GLUCOMTR-MCNC: 207 MG/DL — HIGH (ref 70–99)
GLUCOSE BLDC GLUCOMTR-MCNC: 263 MG/DL — HIGH (ref 70–99)
GLUCOSE SERPL-MCNC: 195 MG/DL — HIGH (ref 70–99)
HCT VFR BLD CALC: 39.1 % — SIGNIFICANT CHANGE UP (ref 34.5–45)
HGB BLD-MCNC: 12.7 G/DL — SIGNIFICANT CHANGE UP (ref 11.5–15.5)
IANC: 9.82 K/UL — HIGH (ref 1.8–7.4)
IMM GRANULOCYTES NFR BLD AUTO: 0.6 % — SIGNIFICANT CHANGE UP (ref 0–0.9)
LYMPHOCYTES # BLD AUTO: 1.11 K/UL — SIGNIFICANT CHANGE UP (ref 1–3.3)
LYMPHOCYTES # BLD AUTO: 9 % — LOW (ref 13–44)
MAGNESIUM SERPL-MCNC: 2.1 MG/DL — SIGNIFICANT CHANGE UP (ref 1.6–2.6)
MCHC RBC-ENTMCNC: 31.1 PG — SIGNIFICANT CHANGE UP (ref 27–34)
MCHC RBC-ENTMCNC: 32.5 G/DL — SIGNIFICANT CHANGE UP (ref 32–36)
MCV RBC AUTO: 95.8 FL — SIGNIFICANT CHANGE UP (ref 80–100)
MONOCYTES # BLD AUTO: 1.13 K/UL — HIGH (ref 0–0.9)
MONOCYTES NFR BLD AUTO: 9.2 % — SIGNIFICANT CHANGE UP (ref 2–14)
NEUTROPHILS # BLD AUTO: 9.82 K/UL — HIGH (ref 1.8–7.4)
NEUTROPHILS NFR BLD AUTO: 79.5 % — HIGH (ref 43–77)
NRBC # BLD AUTO: 0 K/UL — SIGNIFICANT CHANGE UP (ref 0–0)
NRBC # FLD: 0 K/UL — SIGNIFICANT CHANGE UP (ref 0–0)
NRBC BLD AUTO-RTO: 0 /100 WBCS — SIGNIFICANT CHANGE UP (ref 0–0)
NT-PROBNP SERPL-SCNC: 5731 PG/ML — HIGH
PHOSPHATE SERPL-MCNC: 3 MG/DL — SIGNIFICANT CHANGE UP (ref 2.5–4.5)
PLATELET # BLD AUTO: 196 K/UL — SIGNIFICANT CHANGE UP (ref 150–400)
POTASSIUM SERPL-MCNC: 3.7 MMOL/L — SIGNIFICANT CHANGE UP (ref 3.5–5.3)
POTASSIUM SERPL-SCNC: 3.7 MMOL/L — SIGNIFICANT CHANGE UP (ref 3.5–5.3)
PROT SERPL-MCNC: 7.9 G/DL — SIGNIFICANT CHANGE UP (ref 6–8.3)
RBC # BLD: 4.08 M/UL — SIGNIFICANT CHANGE UP (ref 3.8–5.2)
RBC # FLD: 13.2 % — SIGNIFICANT CHANGE UP (ref 10.3–14.5)
RSV RNA NPH QL NAA+NON-PROBE: SIGNIFICANT CHANGE UP
SARS-COV-2 RNA SPEC QL NAA+PROBE: SIGNIFICANT CHANGE UP
SODIUM SERPL-SCNC: 136 MMOL/L — SIGNIFICANT CHANGE UP (ref 135–145)
SOURCE RESPIRATORY: SIGNIFICANT CHANGE UP
WBC # BLD: 12.34 K/UL — HIGH (ref 3.8–10.5)
WBC # FLD AUTO: 12.34 K/UL — HIGH (ref 3.8–10.5)

## 2025-05-23 PROCEDURE — 99285 EMERGENCY DEPT VISIT HI MDM: CPT

## 2025-05-23 PROCEDURE — 71046 X-RAY EXAM CHEST 2 VIEWS: CPT | Mod: 26

## 2025-05-23 PROCEDURE — 99223 1ST HOSP IP/OBS HIGH 75: CPT

## 2025-05-23 RX ORDER — MELATONIN 5 MG
3 TABLET ORAL AT BEDTIME
Refills: 0 | Status: DISCONTINUED | OUTPATIENT
Start: 2025-05-23 | End: 2025-05-28

## 2025-05-23 RX ORDER — AZITHROMYCIN 250 MG
500 CAPSULE ORAL EVERY 24 HOURS
Refills: 0 | Status: DISCONTINUED | OUTPATIENT
Start: 2025-05-24 | End: 2025-05-26

## 2025-05-23 RX ORDER — SODIUM CHLORIDE 9 G/1000ML
1000 INJECTION, SOLUTION INTRAVENOUS
Refills: 0 | Status: DISCONTINUED | OUTPATIENT
Start: 2025-05-23 | End: 2025-05-28

## 2025-05-23 RX ORDER — CEFTRIAXONE 500 MG/1
1000 INJECTION, POWDER, FOR SOLUTION INTRAMUSCULAR; INTRAVENOUS EVERY 24 HOURS
Refills: 0 | Status: DISCONTINUED | OUTPATIENT
Start: 2025-05-23 | End: 2025-05-23

## 2025-05-23 RX ORDER — LEVOTHYROXINE SODIUM 300 MCG
75 TABLET ORAL DAILY
Refills: 0 | Status: DISCONTINUED | OUTPATIENT
Start: 2025-05-23 | End: 2025-05-28

## 2025-05-23 RX ORDER — DEXTROSE 50 % IN WATER 50 %
15 SYRINGE (ML) INTRAVENOUS ONCE
Refills: 0 | Status: DISCONTINUED | OUTPATIENT
Start: 2025-05-23 | End: 2025-05-28

## 2025-05-23 RX ORDER — GABAPENTIN 400 MG/1
300 CAPSULE ORAL
Refills: 0 | DISCHARGE

## 2025-05-23 RX ORDER — IPRATROPIUM BROMIDE AND ALBUTEROL SULFATE .5; 2.5 MG/3ML; MG/3ML
3 SOLUTION RESPIRATORY (INHALATION) EVERY 6 HOURS
Refills: 0 | Status: DISCONTINUED | OUTPATIENT
Start: 2025-05-23 | End: 2025-05-28

## 2025-05-23 RX ORDER — DEXTROSE 50 % IN WATER 50 %
25 SYRINGE (ML) INTRAVENOUS ONCE
Refills: 0 | Status: DISCONTINUED | OUTPATIENT
Start: 2025-05-23 | End: 2025-05-28

## 2025-05-23 RX ORDER — AZITHROMYCIN 250 MG
500 CAPSULE ORAL ONCE
Refills: 0 | Status: COMPLETED | OUTPATIENT
Start: 2025-05-23 | End: 2025-05-23

## 2025-05-23 RX ORDER — CARVEDILOL 3.12 MG/1
6.25 TABLET, FILM COATED ORAL EVERY 12 HOURS
Refills: 0 | Status: DISCONTINUED | OUTPATIENT
Start: 2025-05-23 | End: 2025-05-28

## 2025-05-23 RX ORDER — GLUCAGON 3 MG/1
1 POWDER NASAL ONCE
Refills: 0 | Status: DISCONTINUED | OUTPATIENT
Start: 2025-05-23 | End: 2025-05-28

## 2025-05-23 RX ORDER — EMPAGLIFLOZIN 25 MG/1
1 TABLET, FILM COATED ORAL
Refills: 0 | DISCHARGE

## 2025-05-23 RX ORDER — LOSARTAN POTASSIUM 100 MG/1
25 TABLET, FILM COATED ORAL DAILY
Refills: 0 | Status: DISCONTINUED | OUTPATIENT
Start: 2025-05-23 | End: 2025-05-27

## 2025-05-23 RX ORDER — CEFTRIAXONE 500 MG/1
2000 INJECTION, POWDER, FOR SOLUTION INTRAMUSCULAR; INTRAVENOUS ONCE
Refills: 0 | Status: COMPLETED | OUTPATIENT
Start: 2025-05-23 | End: 2025-05-23

## 2025-05-23 RX ORDER — ACETAMINOPHEN 500 MG/5ML
650 LIQUID (ML) ORAL EVERY 6 HOURS
Refills: 0 | Status: DISCONTINUED | OUTPATIENT
Start: 2025-05-23 | End: 2025-05-28

## 2025-05-23 RX ORDER — INSULIN GLARGINE-YFGN 100 [IU]/ML
20 INJECTION, SOLUTION SUBCUTANEOUS AT BEDTIME
Refills: 0 | Status: DISCONTINUED | OUTPATIENT
Start: 2025-05-23 | End: 2025-05-28

## 2025-05-23 RX ORDER — GABAPENTIN 400 MG/1
300 CAPSULE ORAL DAILY
Refills: 0 | Status: DISCONTINUED | OUTPATIENT
Start: 2025-05-23 | End: 2025-05-28

## 2025-05-23 RX ORDER — CARVEDILOL 3.12 MG/1
1 TABLET, FILM COATED ORAL
Refills: 0 | DISCHARGE

## 2025-05-23 RX ORDER — ALBUTEROL SULFATE 2.5 MG/3ML
1 VIAL, NEBULIZER (ML) INHALATION
Refills: 0 | DISCHARGE

## 2025-05-23 RX ORDER — LEVOTHYROXINE SODIUM 300 MCG
1 TABLET ORAL
Refills: 0 | DISCHARGE

## 2025-05-23 RX ORDER — GABAPENTIN 400 MG/1
1 CAPSULE ORAL
Refills: 0 | DISCHARGE

## 2025-05-23 RX ORDER — MELATONIN 5 MG
1 TABLET ORAL
Refills: 0 | DISCHARGE

## 2025-05-23 RX ORDER — IPRATROPIUM BROMIDE AND ALBUTEROL SULFATE .5; 2.5 MG/3ML; MG/3ML
3 SOLUTION RESPIRATORY (INHALATION) EVERY 6 HOURS
Refills: 0 | Status: DISCONTINUED | OUTPATIENT
Start: 2025-05-23 | End: 2025-05-23

## 2025-05-23 RX ORDER — HEPARIN SODIUM 1000 [USP'U]/ML
5000 INJECTION INTRAVENOUS; SUBCUTANEOUS EVERY 12 HOURS
Refills: 0 | Status: DISCONTINUED | OUTPATIENT
Start: 2025-05-23 | End: 2025-05-28

## 2025-05-23 RX ORDER — INSULIN ASPART 100 [IU]/ML
0 INJECTION, SUSPENSION SUBCUTANEOUS
Refills: 0 | DISCHARGE

## 2025-05-23 RX ORDER — CEFTRIAXONE 500 MG/1
1000 INJECTION, POWDER, FOR SOLUTION INTRAMUSCULAR; INTRAVENOUS EVERY 24 HOURS
Refills: 0 | Status: DISCONTINUED | OUTPATIENT
Start: 2025-05-24 | End: 2025-05-27

## 2025-05-23 RX ORDER — DULAGLUTIDE 4.5 MG/.5ML
0.75 INJECTION, SOLUTION SUBCUTANEOUS
Refills: 0 | DISCHARGE

## 2025-05-23 RX ORDER — INSULIN LISPRO 100 U/ML
INJECTION, SOLUTION INTRAVENOUS; SUBCUTANEOUS
Refills: 0 | Status: DISCONTINUED | OUTPATIENT
Start: 2025-05-23 | End: 2025-05-28

## 2025-05-23 RX ORDER — ATORVASTATIN CALCIUM 80 MG/1
20 TABLET, FILM COATED ORAL AT BEDTIME
Refills: 0 | Status: DISCONTINUED | OUTPATIENT
Start: 2025-05-23 | End: 2025-05-28

## 2025-05-23 RX ORDER — DEXTROSE 50 % IN WATER 50 %
12.5 SYRINGE (ML) INTRAVENOUS ONCE
Refills: 0 | Status: DISCONTINUED | OUTPATIENT
Start: 2025-05-23 | End: 2025-05-28

## 2025-05-23 RX ORDER — CYANOCOBALAMIN 1000 UG/ML
1000 INJECTION INTRAMUSCULAR; SUBCUTANEOUS DAILY
Refills: 0 | Status: DISCONTINUED | OUTPATIENT
Start: 2025-05-23 | End: 2025-05-28

## 2025-05-23 RX ORDER — ATORVASTATIN CALCIUM 80 MG/1
1 TABLET, FILM COATED ORAL
Refills: 0 | DISCHARGE

## 2025-05-23 RX ORDER — IPRATROPIUM BROMIDE AND ALBUTEROL SULFATE .5; 2.5 MG/3ML; MG/3ML
3 SOLUTION RESPIRATORY (INHALATION) ONCE
Refills: 0 | Status: COMPLETED | OUTPATIENT
Start: 2025-05-23 | End: 2025-05-23

## 2025-05-23 RX ORDER — GABAPENTIN 400 MG/1
0 CAPSULE ORAL
Refills: 0 | DISCHARGE

## 2025-05-23 RX ADMIN — Medication 250 MILLIGRAM(S): at 14:10

## 2025-05-23 RX ADMIN — Medication 650 MILLIGRAM(S): at 17:29

## 2025-05-23 RX ADMIN — ATORVASTATIN CALCIUM 20 MILLIGRAM(S): 80 TABLET, FILM COATED ORAL at 21:11

## 2025-05-23 RX ADMIN — GABAPENTIN 300 MILLIGRAM(S): 400 CAPSULE ORAL at 21:11

## 2025-05-23 RX ADMIN — INSULIN LISPRO 3: 100 INJECTION, SOLUTION INTRAVENOUS; SUBCUTANEOUS at 16:44

## 2025-05-23 RX ADMIN — IPRATROPIUM BROMIDE AND ALBUTEROL SULFATE 3 MILLILITER(S): .5; 2.5 SOLUTION RESPIRATORY (INHALATION) at 17:54

## 2025-05-23 RX ADMIN — HEPARIN SODIUM 5000 UNIT(S): 1000 INJECTION INTRAVENOUS; SUBCUTANEOUS at 17:30

## 2025-05-23 RX ADMIN — CEFTRIAXONE 100 MILLIGRAM(S): 500 INJECTION, POWDER, FOR SOLUTION INTRAMUSCULAR; INTRAVENOUS at 13:41

## 2025-05-23 RX ADMIN — IPRATROPIUM BROMIDE AND ALBUTEROL SULFATE 3 MILLILITER(S): .5; 2.5 SOLUTION RESPIRATORY (INHALATION) at 12:25

## 2025-05-23 RX ADMIN — CARVEDILOL 6.25 MILLIGRAM(S): 3.12 TABLET, FILM COATED ORAL at 17:30

## 2025-05-23 RX ADMIN — INSULIN GLARGINE-YFGN 20 UNIT(S): 100 INJECTION, SOLUTION SUBCUTANEOUS at 22:46

## 2025-05-23 RX ADMIN — Medication 25 MILLIGRAM(S): at 21:11

## 2025-05-23 NOTE — H&P ADULT - NSHPPHYSICALEXAM_GEN_ALL_CORE
Vital Signs Last 24 Hrs  T(C): 36.9 (23 May 2025 12:25), Max: 36.9 (23 May 2025 10:32)  T(F): 98.4 (23 May 2025 12:25), Max: 98.4 (23 May 2025 10:32)  HR: 74 (23 May 2025 12:25) (74 - 76)  BP: 158/72 (23 May 2025 12:25) (144/64 - 158/72)  BP(mean): --  RR: 22 (23 May 2025 12:25) (19 - 24)  SpO2: 100% (23 May 2025 12:25) (97% - 100%)    Parameters below as of 23 May 2025 12:25  Patient On (Oxygen Delivery Method): room air        CONSTITUTIONAL: Well-groomed, in no apparent distress  EYES: No conjunctival or scleral injection, non-icteric;   ENMT: No external nasal lesions; MMM  NECK: Trachea midline without palpable neck mass; thyroid not enlarged and non-tender  RESPIRATORY: B/L wheezing heard   CARDIOVASCULAR: +S1S2, RRR, no M/G/R; pedal pulses full and symmetric; no lower extremity edema  GASTROINTESTINAL: No palpable masses or tenderness, +BS throughout, no rebound/guarding; no hepatosplenomegaly; no hernia palpated  LYMPHATIC: No cervical LAD or tenderness  SKIN: No rashes or ulcers noted  NEUROLOGIC: CN II-XII intact; sensation intact in LEs b/l to light touch  PSYCHIATRIC: A+O x 3; mood and affect appropriate; appropriate insight and judgment

## 2025-05-23 NOTE — H&P ADULT - ASSESSMENT
69 F w/pmhx of ESRD (HD T/Th/Sat), HTN, DM2 and hypothyroidism presents to the ED for evaluation of a productive cough and shortness of breath for 3-4 days. Pt endorses associated generalized weakness and chills but no documented fevers at home.        AHRF 2/2 to suspected PNA

## 2025-05-23 NOTE — ED PROVIDER NOTE - PROGRESS NOTE DETAILS
Dm PGY-2: patient's x-ray with clear R sided PNA, cardiomegaly so unclear if consolidation on L. given patient's symptoms and weakness, CURB65 score of 2, will admit patient for CAP treatment.

## 2025-05-23 NOTE — ED ADULT NURSE NOTE - OBJECTIVE STATEMENT
69 year old female, received to room 7. Pt A&Ox4, ambulatory at baseline. Respirations equal and unlabored. Past medical history of HLD, HTN, DM2, ESRD on dialysis Tuesday, Thursday, Saturday. Pt Italian speaking, requesting daughter at bedside to translate. Pt c/o shortness of breath and productive cough x3 days. Pt O2 sat 90% in triage, placed on 2L NC with improvement to 99%. Pt speaking in full sentences. Pt denies chest pain, palpitations, dizziness, blurry vision, n/v/d, fevers or chills. L AV fistula noted, last dialysis Thursday. Pt placed on cardiac monitor, NSR. No acute distress noted. Safety maintained, bed in lowest position, side rails raised, call bell in reach.

## 2025-05-23 NOTE — ED PROVIDER NOTE - ATTENDING CONTRIBUTION TO CARE
I performed a face-to-face evaluation of the patient and performed a history and physical examination. I agree with the history and physical examination. If this was a PA visit, I personally saw the patient with the PA and performed a substantive portion of the visit including all aspects of the medical decision making.    Dialysis, subjective F, cough. Likely PNA. Check CXR, BCx. Give Abx. Check BNP; if elevated, then check trop.

## 2025-05-23 NOTE — ED PROVIDER NOTE - OBJECTIVE STATEMENT
69-year-old female with past medical history of ESRD on HD (T/Th/Sat), hypertension, type 2 diabetes presents to emergency department for evaluation of cough and shortness of breath x 3 to 4 days.  Also with generalized weakness, chills but no documented fevers at home.  No recent travel.  Patient does not have a history of underlying heart failure, history of pleural effusions, COPD/asthma, underlying lung disease or O2 requirement at home.  Has not missed any dialysis sessions recently.  Denies chest pain, palpitations, abdominal pain, GI or  symptoms.  Not endorsing back pain.  No known drug allergies.  No sick contacts.

## 2025-05-23 NOTE — H&P ADULT - HISTORY OF PRESENT ILLNESS
69 F w/pmhx of ESRD (HD T/Th/Sat), HTN, DM2 and hypothyroidism presents to the ED for evaluation of a productive cough and shortness of breath for 3-4 days. Pt endorses associated generalized weakness and chills but no documented fevers at home. No recent travel. Denies sick contacts at home, she lives with her daughter and her family. Pt has not missed HD sessions. Denies CP, palpitations, abdominal pain, nausea, vomiting, diarrhea. COVID negative. Pt was initially on 2L NC oxygen at home now weaned to room air.    RVP negative  CXR negative    Pertinent labs: WBC 12.34, ProBNP 5731  Vitals: T 98.1, HR 74, /72, RR 22    AHRF 2/2 to suspected PNA

## 2025-05-23 NOTE — ED ADULT TRIAGE NOTE - CHIEF COMPLAINT QUOTE
c/o SOB and cough X 2 days. endorsing wheezing. O2 90% on room air, placed on 2L NC. Phx ESRD (R AVF) (Tues/Thurs/Sat), DM, HTN, HLD. .

## 2025-05-23 NOTE — PATIENT PROFILE ADULT - FALL HARM RISK - HARM RISK INTERVENTIONS

## 2025-05-23 NOTE — H&P ADULT - NSHPLABSRESULTS_GEN_ALL_CORE
LABS:  CAPILLARY BLOOD GLUCOSE      POCT Blood Glucose.: 129 mg/dL (23 May 2025 13:47)  POCT Blood Glucose.: 250 mg/dL (23 May 2025 10:32)                            12.7   12.34 )-----------( 196      ( 23 May 2025 11:30 )             39.1     05-23    136  |  97[L]  |  36[H]  ----------------------------<  195[H]  3.7   |  25  |  5.09[H]    Ca    8.5      23 May 2025 11:30  Phos  3.0     05-23  Mg     2.10     05-23    TPro  7.9  /  Alb  3.6  /  TBili  1.0  /  DBili  x   /  AST  34[H]  /  ALT  18  /  AlkPhos  119  05-23          Urinalysis Basic - ( 23 May 2025 11:30 )    Color: x / Appearance: x / SG: x / pH: x  Gluc: 195 mg/dL / Ketone: x  / Bili: x / Urobili: x   Blood: x / Protein: x / Nitrite: x   Leuk Esterase: x / RBC: x / WBC x   Sq Epi: x / Non Sq Epi: x / Bacteria: x

## 2025-05-23 NOTE — ED PROVIDER NOTE - CLINICAL SUMMARY MEDICAL DECISION MAKING FREE TEXT BOX
Christina: Dialysis, subjective F, cough. Likely PNA. Check CXR, BCx. Give Abx. Check BNP; if elevated, then check trop.

## 2025-05-23 NOTE — PHARMACOTHERAPY INTERVENTION NOTE - COMMENTS
Medication history is complete. Medication list updated in Outpatient Medication Record (OMR) patient's family member, medication vials and [facility].     Home Medications:  albuterol 90 mcg/inh inhalation aerosol: 1 puff(s) inhaled every 6 hours as needed for  shortness of breath and/or wheezing   atorvastatin 20 mg oral tablet: 1 tab(s) orally once a day (at bedtime)   calcium acetate 667 mg oral capsule: 1 cap(s) orally 3 times a day (before meals)   carvedilol 6.25 mg oral tablet: 1 tab(s) orally 2 times a day   famotidine 20 mg oral tablet: 1 tab(s) orally once a day in the morning before breakfast   gabapentin 300 mg oral capsule: 1 cap(s) orally once a day (at bedtime)   hydrALAZINE 50 mg oral tablet: 1 to 2 tab(s) (50 mg to 100 mg) orally 3 times a day based on patient&#x27;s blood pressure   Jardiance 25 mg oral tablet: 1 tab(s) orally once a day   Lantus Solostar Pen 100 units/mL subcutaneous solution: 15 to 20 unit(s) subcutaneous once a day (at bedtime)   levothyroxine 75 mcg (0.075 mg) oral tablet: 1 tab(s) orally once a day   losartan 25 mg oral tablet: 1 tab(s) orally once a day   Melatonin 5 mg oral tablet: 1 tab(s) orally once a day (at bedtime) as needed for sleep  NovoLOG Mix 70/30 FlexPen subcutaneous suspension: 15 to 20 units subcutaneously once a day in the morning   Trulicity Pen 0.75 mg/0.5 mL subcutaneous solution: 0.75 milligram(s) subcutaneously once a week on Monday   Vitamin B12 1000 mcg oral tablet: 1 tab(s) orally once a day     Please call spectra [phone] if you have any questions.

## 2025-05-23 NOTE — ED PROVIDER NOTE - PHYSICAL EXAMINATION
Gen: well appearing, in no acute distress   Head: normal appearing  HEENT: normal conjunctiva, oral mucosa moist, vision grossly intact   Lung: mild tachypnea, diminished breath sounds at DELFINO bases, occasional expiratory wheeze   CV: regular rate and rhythm, no murmurs, no JVD or lower extremity edema   Abd: soft, non distended, non tender   MSK: no visible deformities, ambulating without difficulty, LUE with AV fistula with palpable thrill   Neuro: No focal deficits, AAOx3  Skin: Warm, no rashes   Psych: normal affect

## 2025-05-23 NOTE — H&P ADULT - PROBLEM SELECTOR PLAN 1
+productive cough and worsening SOB x3 days  RVP panel negative  Afebrile  WBC 12.34  CXR negative  s/p Rocephin 2g x1 and Azithromycin 500 mg x1 in ED  c/w Rocephin 1g daily (5/24-  c/w Azithromycin 500 mg QD (5/24-  c/w duonebs q6 PRN  Supplemental oxygen as needed

## 2025-05-23 NOTE — H&P ADULT - NSICDXFAMHXNEG_GEN_ALL
Quality 226: Preventive Care And Screening: Tobacco Use: Screening And Cessation Intervention: Patient screened for tobacco use and is an ex/non-smoker Detail Level: Detailed Quality 431: Preventive Care And Screening: Unhealthy Alcohol Use - Screening: Patient screened for unhealthy alcohol use using a single question and scores less than 2 times per year Quality 111:Pneumonia Vaccination Status For Older Adults: Pneumococcal Vaccination Previously Received Quality 130: Documentation Of Current Medications In The Medical Record: Current Medications Documented asthma

## 2025-05-23 NOTE — ED ADULT NURSE REASSESSMENT NOTE - NS ED NURSE REASSESS COMMENT FT1
Pt received from outgoing nurse agnes Antunez  Pt is A&Ox4, ambulatory without assistance. neuro/sensory intact. airway patent, speaking clearly in full sentences. breathing is even and unlabored.  spontaneous movement of all extremities. Placed on cardiac monitor and continuous pulse oximetry. VS as noted in flowsheet. 20g RAc IV , +blood return, flushes without difficulty. l medications administered as ordered. denies chest pain, SOB, headache, dizziness, abdominal pain, n/v/d, urinary symptoms, fevers/chills, numbness/tingling.  comfort measures provided. stretcher set in lowest position, call bell within reach, safety maintained.

## 2025-05-23 NOTE — PATIENT PROFILE ADULT - HOME ACCESSIBILITY CONCERNS
Patient called states she received a letter in the mail stating it's time for her to schedule a medical wellness visit. Patient states she was in for an annual physical on 11/2  And did fill out the medicare wellness questionnaire patient would like someone to call her to discuss if she need to make another appointment .    
Writer called Rand Mckeon to explain that she is up to date on her MWV and would not need to have another.  
none

## 2025-05-24 LAB
A1C WITH ESTIMATED AVERAGE GLUCOSE RESULT: 6.2 % — HIGH (ref 4–5.6)
DIALYSIS INSTRUMENT RESULT - HEPATITIS B SURFACE ANTIGEN: ABNORMAL
ESTIMATED AVERAGE GLUCOSE: 131 — SIGNIFICANT CHANGE UP
GLUCOSE BLDC GLUCOMTR-MCNC: 134 MG/DL — HIGH (ref 70–99)
GLUCOSE BLDC GLUCOMTR-MCNC: 184 MG/DL — HIGH (ref 70–99)
GLUCOSE BLDC GLUCOMTR-MCNC: 193 MG/DL — HIGH (ref 70–99)
GLUCOSE BLDC GLUCOMTR-MCNC: 204 MG/DL — HIGH (ref 70–99)
GRAM STN FLD: ABNORMAL
HBV SURFACE AG SER-ACNC: REACTIVE
MRSA PCR RESULT.: SIGNIFICANT CHANGE UP
S AUREUS DNA NOSE QL NAA+PROBE: SIGNIFICANT CHANGE UP
SPECIMEN SOURCE: SIGNIFICANT CHANGE UP

## 2025-05-24 PROCEDURE — 71250 CT THORAX DX C-: CPT | Mod: 26

## 2025-05-24 RX ORDER — LABETALOL HYDROCHLORIDE 200 MG/1
5 TABLET, FILM COATED ORAL ONCE
Refills: 0 | Status: COMPLETED | OUTPATIENT
Start: 2025-05-24 | End: 2025-05-24

## 2025-05-24 RX ORDER — DEXTROMETHORPHAN HBR, GUAIFENESIN 200 MG/10ML
100 LIQUID ORAL EVERY 6 HOURS
Refills: 0 | Status: DISCONTINUED | OUTPATIENT
Start: 2025-05-24 | End: 2025-05-28

## 2025-05-24 RX ADMIN — LABETALOL HYDROCHLORIDE 5 MILLIGRAM(S): 200 TABLET, FILM COATED ORAL at 02:02

## 2025-05-24 RX ADMIN — Medication 25 MILLIGRAM(S): at 05:55

## 2025-05-24 RX ADMIN — Medication 75 MICROGRAM(S): at 06:45

## 2025-05-24 RX ADMIN — ATORVASTATIN CALCIUM 20 MILLIGRAM(S): 80 TABLET, FILM COATED ORAL at 23:40

## 2025-05-24 RX ADMIN — LOSARTAN POTASSIUM 25 MILLIGRAM(S): 100 TABLET, FILM COATED ORAL at 05:55

## 2025-05-24 RX ADMIN — Medication 250 MILLIGRAM(S): at 06:28

## 2025-05-24 RX ADMIN — Medication 1 APPLICATION(S): at 13:09

## 2025-05-24 RX ADMIN — INSULIN LISPRO 2: 100 INJECTION, SOLUTION INTRAVENOUS; SUBCUTANEOUS at 18:33

## 2025-05-24 RX ADMIN — HEPARIN SODIUM 5000 UNIT(S): 1000 INJECTION INTRAVENOUS; SUBCUTANEOUS at 17:29

## 2025-05-24 RX ADMIN — Medication 25 MILLIGRAM(S): at 23:39

## 2025-05-24 RX ADMIN — INSULIN LISPRO 1: 100 INJECTION, SOLUTION INTRAVENOUS; SUBCUTANEOUS at 13:05

## 2025-05-24 RX ADMIN — INSULIN GLARGINE-YFGN 20 UNIT(S): 100 INJECTION, SOLUTION SUBCUTANEOUS at 23:49

## 2025-05-24 RX ADMIN — CARVEDILOL 6.25 MILLIGRAM(S): 3.12 TABLET, FILM COATED ORAL at 05:55

## 2025-05-24 RX ADMIN — Medication 25 MILLIGRAM(S): at 13:06

## 2025-05-24 RX ADMIN — INSULIN LISPRO 1: 100 INJECTION, SOLUTION INTRAVENOUS; SUBCUTANEOUS at 09:04

## 2025-05-24 RX ADMIN — DEXTROMETHORPHAN HBR, GUAIFENESIN 100 MILLIGRAM(S): 200 LIQUID ORAL at 23:46

## 2025-05-24 RX ADMIN — CEFTRIAXONE 100 MILLIGRAM(S): 500 INJECTION, POWDER, FOR SOLUTION INTRAMUSCULAR; INTRAVENOUS at 05:52

## 2025-05-24 RX ADMIN — HEPARIN SODIUM 5000 UNIT(S): 1000 INJECTION INTRAVENOUS; SUBCUTANEOUS at 05:55

## 2025-05-24 RX ADMIN — CARVEDILOL 6.25 MILLIGRAM(S): 3.12 TABLET, FILM COATED ORAL at 17:29

## 2025-05-24 RX ADMIN — CYANOCOBALAMIN 1000 MICROGRAM(S): 1000 INJECTION INTRAMUSCULAR; SUBCUTANEOUS at 13:06

## 2025-05-24 RX ADMIN — GABAPENTIN 300 MILLIGRAM(S): 400 CAPSULE ORAL at 13:06

## 2025-05-24 NOTE — CONSULT NOTE ADULT - ASSESSMENT
69 F PMhx ESRD (T/Th/Sat), HTN, DM II and hypothyroidism who presented w/ productive cough and SOB for about 4 days    PNA  sepsis- febrile to 100.5 on admission, leukocytosis  patient w/ productive cough of green sputum  SARS-CoV-2/ RSV/ flu PCR negative  CXR without focal consolidation  wheezing and rhonchi on exam    Recommendations  c/w ceftriaxone 1g daily  c/w azithromycin 500mg daily  sputum culture (ordered)  duonebs  check CT chest without contrast  trend temps, wbc  - if febrile check blood cultures    Anthony Rudd M.D.  [facility]  [phone]  After 5pm on weekdays and all day on weekends - please call [phone]  Available on [employer] teams    Thank you for consulting us and involving us in the management of this patients case. In addition to reviewing history, imaging, documents, labs, microbiology, took into account antibiotic stewardship, local antibiogram and infection control strategies and potential transmission issues at time of treatment decision making process. 
69-year-old female with past medical history of ESRD on HD (T/Th/Sat), hypertension, type 2 diabetes presents to emergency department for evaluation of cough and shortness of breath x 3 to 4 days.  concern for PNA    ESRD on HD via avf TTS  from [facility]  nephrologist Dr. Todd  last hd 5/22  hd tmr  hep b ag  consent in chart  - Monitor BMP     HTN   acceptable  resume home meds  monitor BP     anemia  at goal  monitor7    ckd mbd  check pth  monitor phos and calcium daily

## 2025-05-24 NOTE — CONSULT NOTE ADULT - SUBJECTIVE AND OBJECTIVE BOX
[facility]  LEWIS Ridley S. Shah, Y. Patel, G. Casimir  [phone]  ([phone] - weekdays after 5pm and weekends)    LA TRAN  69y, Female  [id number]    HPI--  HPI:  69 F PMhx ESRD (T/Th/Sat), HTN, DM II and hypothyroidism who presented w/ productive cough and SOB for about 4 days. Pt endorses associated generalized weakness and chills but no documented fevers at home. No recent travel. Denies sick contacts at home. Pt has not missed HD sessions. Denies CP, palpitations, abdominal pain, nausea, vomiting, diarrhea. Denies rhinorrhea, nasal congestion, sore throat. Reports abdominal pain from constant coughing.  CXR without focal consolidation  In ED patient febrile to 100.5. She was started on ceftriaxone/ azithromycin    ROS: 14 point review of systems completed, pertinent positives and negatives as per HPI.    Allergies: No Known Allergies    PMH -- HTN (hypertension)    HLD (hyperlipidemia)    Diabetes mellitus, type II    ESRD on dialysis      PSH -- AVF (arteriovenous fistula)      FH -- No pertinent family history in first degree relatives    No pertinent family history in first degree relatives      Social History -- denies tobacco, alcohol or illicit drug use    Physical Exam--  Vital Signs Last 24 Hrs  T(F): 98 (24 May 2025 05:50), Max: 100.5 (23 May 2025 17:15)  HR: 87 (24 May 2025 05:50) (66 - 87)  BP: 155/74 (24 May 2025 05:50) (144/64 - 173/78)  RR: 18 (24 May 2025 05:50) (18 - 24)  SpO2: 99% (24 May 2025 05:50) (95% - 100%)  General: nontoxic-appearing, no acute distress  HEENT: anicteric  Lungs: wheezing, rhonchi R>L  Heart: S1, S2, normal rate.   Abdomen: Soft. Nondistended. Nontender.   Neuro: no obvious focal deficits   Back: No costovertebral angle tenderness.  Extremities: No LE edema.   Skin: Warm. Dry. No rash.  Psychiatric: Appropriate affect and mood for situation.     Laboratory & Imaging Data--  CBC:                       12.7   12.34 )-----------( 196      ( 23 May 2025 11:30 )             39.1     WBC Count: 12.34 K/uL (05-23-25 @ 11:30)    CMP: 05-23    136  |  97[L]  |  36[H]  ----------------------------<  195[H]  3.7   |  25  |  5.09[H]    Ca    8.5      23 May 2025 11:30  Phos  3.0     05-23  Mg     2.10     05-23    TPro  7.9  /  Alb  3.6  /  TBili  1.0  /  DBili  x   /  AST  34[H]  /  ALT  18  /  AlkPhos  119  05-23    LIVER FUNCTIONS - ( 23 May 2025 11:30 )  Alb: 3.6 g/dL / Pro: 7.9 g/dL / ALK PHOS: 119 U/L / ALT: 18 U/L / AST: 34 U/L / GGT: x           Urinalysis Basic - ( 23 May 2025 11:30 )    Color: x / Appearance: x / SG: x / pH: x  Gluc: 195 mg/dL / Ketone: x  / Bili: x / Urobili: x   Blood: x / Protein: x / Nitrite: x   Leuk Esterase: x / RBC: x / WBC x   Sq Epi: x / Non Sq Epi: x / Bacteria: x      Microbiology:       Radiology--  ***  Active Medications--  acetaminophen     Tablet .. 650 milliGRAM(s) Oral every 6 hours PRN  albuterol/ipratropium for Nebulization 3 milliLiter(s) Nebulizer every 6 hours PRN  atorvastatin 20 milliGRAM(s) Oral at bedtime  azithromycin  IVPB 500 milliGRAM(s) IV Intermittent every 24 hours  carvedilol 6.25 milliGRAM(s) Oral every 12 hours  cefTRIAXone   IVPB 1000 milliGRAM(s) IV Intermittent every 24 hours  chlorhexidine 2% Cloths 1 Application(s) Topical daily  cyanocobalamin 1000 MICROGram(s) Oral daily  dextrose 5%. 1000 milliLiter(s) IV Continuous <Continuous>  dextrose 5%. 1000 milliLiter(s) IV Continuous <Continuous>  dextrose 50% Injectable 25 Gram(s) IV Push once  dextrose 50% Injectable 12.5 Gram(s) IV Push once  dextrose 50% Injectable 25 Gram(s) IV Push once  dextrose Oral Gel 15 Gram(s) Oral once PRN  gabapentin 300 milliGRAM(s) Oral daily  glucagon  Injectable 1 milliGRAM(s) IntraMuscular once  heparin   Injectable 5000 Unit(s) SubCutaneous every 12 hours  hydrALAZINE 25 milliGRAM(s) Oral three times a day  insulin glargine Injectable (LANTUS) 20 Unit(s) SubCutaneous at bedtime  insulin lispro (ADMELOG) corrective regimen sliding scale   SubCutaneous three times a day before meals  levothyroxine 75 MICROGram(s) Oral daily  losartan 25 milliGRAM(s) Oral daily  melatonin 3 milliGRAM(s) Oral at bedtime PRN  sodium chloride 0.9% Bolus. 100 milliLiter(s) IV Bolus every 5 minutes PRN    Antimicrobials:   azithromycin  IVPB 500 milliGRAM(s) IV Intermittent every 24 hours  cefTRIAXone   IVPB 1000 milliGRAM(s) IV Intermittent every 24 hours    Immunologic:   
[facility]   MD ANA KAPADIA MD ANGELA WONG, PA        TEL:  OFFICE: [phone]  From 5pm-7am answering service [phone]    --- INITIAL RENAL CONSULT NOTE ---date of service 05-23-25 @ 14:47    HPI:  69-year-old female with past medical history of ESRD on HD (T/Th/Sat), hypertension, type 2 diabetes presents to emergency department for evaluation of cough and shortness of breath x 3 to 4 days.  Also with generalized weakness, chills but no documented fevers at home.  No recent travel.  Patient does not have a history of underlying heart failure, history of pleural effusions, COPD/asthma, underlying lung disease or O2 requirement at home.  Has not missed any dialysis sessions recently.  Denies chest pain, palpitations, abdominal pain, GI or  symptoms.  Not endorsing back pain.  No known drug allergies.  No sick contacts.      Allergies:  No Known Allergies      PAST MEDICAL & SURGICAL HISTORY:  HTN (hypertension)      HLD (hyperlipidemia)      Diabetes mellitus, type II      ESRD on dialysis      AVF (arteriovenous fistula)          Home Medications Reviewed    Hospital Medications:   MEDICATIONS  (STANDING):  albuterol/ipratropium for Nebulization 3 milliLiter(s) Nebulizer every 6 hours  atorvastatin 20 milliGRAM(s) Oral at bedtime  azithromycin  IVPB 500 milliGRAM(s) IV Intermittent every 24 hours  carvedilol 6.25 milliGRAM(s) Oral every 12 hours  cyanocobalamin 1000 MICROGram(s) Oral daily  dextrose 5%. 1000 milliLiter(s) (100 mL/Hr) IV Continuous <Continuous>  dextrose 5%. 1000 milliLiter(s) (50 mL/Hr) IV Continuous <Continuous>  dextrose 50% Injectable 25 Gram(s) IV Push once  dextrose 50% Injectable 12.5 Gram(s) IV Push once  dextrose 50% Injectable 25 Gram(s) IV Push once  gabapentin 300 milliGRAM(s) Oral daily  glucagon  Injectable 1 milliGRAM(s) IntraMuscular once  hydrALAZINE 25 milliGRAM(s) Oral three times a day  insulin glargine Injectable (LANTUS) 20 Unit(s) SubCutaneous at bedtime  insulin lispro (ADMELOG) corrective regimen sliding scale   SubCutaneous three times a day before meals  levothyroxine 75 MICROGram(s) Oral daily  losartan 25 milliGRAM(s) Oral daily      SOCIAL HISTORY:  Denies ETOh, Smoking,     FAMILY HISTORY:  No pertinent family history in first degree relatives        REVIEW OF SYSTEMS:  CONSTITUTIONAL: +weakness, no fevers + chills  EYES/ENT: No visual changes;  No vertigo or throat pain   NECK: No pain or stiffness  RESPIRATORY: see hpi  CARDIOVASCULAR: No chest pain or palpitations.  GASTROINTESTINAL: No abdominal or epigastric pain. No nausea, vomiting, or hematemesis; No diarrhea or constipation. No melena or hematochezia.  GENITOURINARY: No dysuria, frequency, foamy urine, urinary urgency, incontinence or hematuria  NEUROLOGICAL: No numbness or weakness  SKIN: No itching, burning, rashes, or lesions   VASCULAR: No bilateral lower extremity edema.   All other review of systems is negative unless indicated above.    VITALS:  T(F): 98.4 (05-23-25 @ 12:25), Max: 98.4 (05-23-25 @ 10:32)  HR: 74 (05-23-25 @ 12:25)  BP: 158/72 (05-23-25 @ 12:25)  RR: 22 (05-23-25 @ 12:25)  SpO2: 100% (05-23-25 @ 12:25)  Wt(kg): --      Weight (kg): 69.4 (05-23 @ 10:32)    PHYSICAL EXAM:  General: NAD  HEENT: anicteric sclera, oropharynx clear, MMM  Neck: No JVD  Respiratory: CTAB, no wheezes, rales or rhonchi  Cardiovascular: S1, S2, RRR  Gastrointestinal: BS+, soft, NT/ND  Extremities: No cyanosis or clubbing. No peripheral edema  Neurological: A/O x 3, no focal deficits  Psychiatric: Normal mood, normal affect  : No CVA tenderness. No early.   Skin: No rashes  Vascular Access: avf    LABS:  05-23    136  |  97[L]  |  36[H]  ----------------------------<  195[H]  3.7   |  25  |  5.09[H]    Ca    8.5      23 May 2025 11:30  Phos  3.0     05-23  Mg     2.10     05-23    TPro  7.9  /  Alb  3.6  /  TBili  1.0  /  DBili      /  AST  34[H]  /  ALT  18  /  AlkPhos  119  05-23    Creatinine Trend: 5.09 <--                        12.7   12.34 )-----------( 196      ( 23 May 2025 11:30 )             39.1     Urine Studies:  Urinalysis Basic - ( 23 May 2025 11:30 )    Color:  / Appearance:  / SG:  / pH:   Gluc: 195 mg/dL / Ketone:   / Bili:  / Urobili:    Blood:  / Protein:  / Nitrite:    Leuk Esterase:  / RBC:  / WBC    Sq Epi:  / Non Sq Epi:  / Bacteria:           RADIOLOGY & ADDITIONAL STUDIES:                
Cardiovascular Disease Initial Evaluation  Date of service: 05-23-25 @ 16:53    CHIEF COMPLAINT:  SOB    HISTORY OF PRESENT ILLNESS:  Ms. Brown is a 69 F w/pmhx of ESRD (HD T/Th/Sat), HTN, DM2 and hypothyroidism presents to the ED for evaluation of a productive cough and shortness of breath for 3-4 days. Pt endorses associated generalized weakness and chills but no documented fevers at home. No recent travel. Denies sick contacts at home, she lives with her daughter and her family. Pt has not missed HD sessions. Denies CP, palpitations, abdominal pain, nausea, vomiting, diarrhea. COVID negative. Pt was initially on 2L NC oxygen at home now weaned to room air. Daughter at bedside assisting with translation. Patient with pleuritic chest pain. Denies CAD history.       Allergies    No Known Allergies    Intolerances    	    MEDICATIONS:  carvedilol 6.25 milliGRAM(s) Oral every 12 hours  heparin   Injectable 5000 Unit(s) SubCutaneous every 12 hours  hydrALAZINE 25 milliGRAM(s) Oral three times a day  losartan 25 milliGRAM(s) Oral daily    azithromycin  IVPB 500 milliGRAM(s) IV Intermittent every 24 hours    albuterol/ipratropium for Nebulization 3 milliLiter(s) Nebulizer every 6 hours PRN    gabapentin 300 milliGRAM(s) Oral daily  melatonin 3 milliGRAM(s) Oral at bedtime PRN      atorvastatin 20 milliGRAM(s) Oral at bedtime  dextrose 50% Injectable 25 Gram(s) IV Push once  dextrose 50% Injectable 12.5 Gram(s) IV Push once  dextrose 50% Injectable 25 Gram(s) IV Push once  dextrose Oral Gel 15 Gram(s) Oral once PRN  glucagon  Injectable 1 milliGRAM(s) IntraMuscular once  insulin glargine Injectable (LANTUS) 20 Unit(s) SubCutaneous at bedtime  insulin lispro (ADMELOG) corrective regimen sliding scale   SubCutaneous three times a day before meals  levothyroxine 75 MICROGram(s) Oral daily    chlorhexidine 2% Cloths 1 Application(s) Topical daily  cyanocobalamin 1000 MICROGram(s) Oral daily  dextrose 5%. 1000 milliLiter(s) IV Continuous <Continuous>  dextrose 5%. 1000 milliLiter(s) IV Continuous <Continuous>  sodium chloride 0.9% Bolus. 100 milliLiter(s) IV Bolus every 5 minutes PRN      PAST MEDICAL & SURGICAL HISTORY:  HTN (hypertension)      HLD (hyperlipidemia)      Diabetes mellitus, type II      ESRD on dialysis      AVF (arteriovenous fistula)          FAMILY HISTORY:  No pertinent family history in first degree relatives        SOCIAL HISTORY:    The patient is a nonsmoker       REVIEW OF SYSTEMS:  See HPI, otherwise complete 14 point review of systems negative    [x ] All others negative	  [ ] Unable to obtain    PHYSICAL EXAM:  T(C): 36.9 (05-23-25 @ 12:25), Max: 36.9 (05-23-25 @ 10:32)  HR: 74 (05-23-25 @ 12:25) (74 - 76)  BP: 158/72 (05-23-25 @ 12:25) (144/64 - 158/72)  RR: 22 (05-23-25 @ 12:25) (19 - 24)  SpO2: 100% (05-23-25 @ 12:25) (97% - 100%)  Wt(kg): --  I&O's Summary      Appearance: No Acute Distress; resting comfortably  HEENT:  Normal oral mucosa, PERRL, EOMI	  Cardiovascular: Normal S1 S2, No JVD, No murmurs/rubs/gallops  Respiratory: Normal respiratory effort; Lungs clear to auscultation bilaterally  Gastrointestinal:  Soft, Non-tender, + BS	  Skin: No rashes, No ecchymoses, No cyanosis	  Neurologic: Non-focal; no weakness  Extremities: No clubbing, cyanosis or edema  Vascular: Peripheral pulses palpable 2+ bilaterally  Psychiatry: A & O x 3, Mood & affect appropriate    Laboratory Data:	 	    CBC Full  -  ( 23 May 2025 11:30 )  WBC Count : 12.34 K/uL  Hemoglobin : 12.7 g/dL  Hematocrit : 39.1 %  Platelet Count - Automated : 196 K/uL  Mean Cell Volume : 95.8 fL  Mean Cell Hemoglobin : 31.1 pg  Mean Cell Hemoglobin Concentration : 32.5 g/dL  Auto Neutrophil # : x  Auto Lymphocyte # : x  Auto Monocyte # : x  Auto Eosinophil # : x  Auto Basophil # : x  Auto Neutrophil % : x  Auto Lymphocyte % : x  Auto Monocyte % : x  Auto Eosinophil % : x  Auto Basophil % : x    05-23    136  |  97[L]  |  36[H]  ----------------------------<  195[H]  3.7   |  25  |  5.09[H]    Ca    8.5      23 May 2025 11:30  Phos  3.0     05-23  Mg     2.10     05-23    TPro  7.9  /  Alb  3.6  /  TBili  1.0  /  DBili  x   /  AST  34[H]  /  ALT  18  /  AlkPhos  119  05-23      proBNP:   Lipid Profile:   HgA1c:   TSH:       CARDIAC MARKERS:            Interpretation of Telemetry: sinus	    ECG: sinus rhythm   RADIOLOGY:  OTHER: 	    PREVIOUS DIAGNOSTIC TESTING:    [x ] Echocardiogram: 2022  1. Mitral annular calcification, otherwise normal mitral  valve. Minimal mitral regurgitation.  2. Calcified trileaflet aortic valve with normal opening.  Minimal aortic regurgitation.  3. Normal left ventricular internal dimensions and wall  thicknesses.  4. Endocardium not well visualized; grossly normal left  ventricular systolic function.  5. Mild diastolic dysfunction (Stage I).  6. The right ventricle is not well visualized; grossly  normal right ventricular systolic function.  7. Normal tricuspid valve.  Mild-moderate tricuspid  regurgitation.  8. Estimated pulmonary artery systolic pressure equals 82  mm Hg, assuming right atrial pressure equals 10  mm Hg,  consistent with severe pulmonary hypertension.  [ ] Catheterization:  [ x] Stress Test:  2022  * Myocardial Perfusion SPECT results are normal.  * Review of raw data shows: The study is of good technical  quality.  * The left ventricle was normal in size. Normal myocardial  perfusion scan,with no evidence of infarction or inducible  ischemia.  * Post-stress gated wall motion analysis was performed  (LVEF > 70%;LVEDV = 63 ml.), revealing normal LV function.  There were no segmental wall motion abnormalities. RV  function appeared normal.    Assessment:  69 F w/pmhx of ESRD (HD T/Th/Sat), HTN, DM2 and hypothyroidism presents to the ED for evaluation of a productive cough and shortness of breath for 3-4 days    Plan of Care:    #Hypoxia   - acs ruled out  - EKG shows sinus rhythm  - BNP elevated however patient does not appear fluid overloaded on exam  - Likely pulmonary in origin  - Of note patient reported to have severe pulmonary HTN on Echo in 2022  - NST 2022 normal perfusion  - Recommend repeat TTE  - further recommendations to follow    #Esrd  - HD as per renal    #HTN  - continue home medications    #DM  - iSS      Complex medical decision making in a patient with multiple co-morbidities.   77 minutes spent on total encounter; more than 50% of the visit was spent counseling and/or coordinating care by the attending physician.   	  Xander Eaton,  [facility]  Cardiovascular Diseases  [phone]

## 2025-05-25 LAB
ANION GAP SERPL CALC-SCNC: 14 MMOL/L — SIGNIFICANT CHANGE UP (ref 7–14)
BUN SERPL-MCNC: 28 MG/DL — HIGH (ref 7–23)
CALCIUM SERPL-MCNC: 8.5 MG/DL — SIGNIFICANT CHANGE UP (ref 8.4–10.5)
CHLORIDE SERPL-SCNC: 95 MMOL/L — LOW (ref 98–107)
CO2 SERPL-SCNC: 24 MMOL/L — SIGNIFICANT CHANGE UP (ref 22–31)
CREAT SERPL-MCNC: 4.08 MG/DL — HIGH (ref 0.5–1.3)
EGFR: 11 ML/MIN/1.73M2 — LOW
EGFR: 11 ML/MIN/1.73M2 — LOW
GLUCOSE BLDC GLUCOMTR-MCNC: 134 MG/DL — HIGH (ref 70–99)
GLUCOSE BLDC GLUCOMTR-MCNC: 151 MG/DL — HIGH (ref 70–99)
GLUCOSE BLDC GLUCOMTR-MCNC: 223 MG/DL — HIGH (ref 70–99)
GLUCOSE BLDC GLUCOMTR-MCNC: 293 MG/DL — HIGH (ref 70–99)
GLUCOSE SERPL-MCNC: 171 MG/DL — HIGH (ref 70–99)
HBV CORE AB SER-ACNC: REACTIVE
HBV CORE IGM SER-ACNC: SIGNIFICANT CHANGE UP
HBV SURFACE AB SER-ACNC: <3.3 MIU/ML — LOW
HBV SURFACE AG SER-ACNC: REACTIVE
HCT VFR BLD CALC: 37.8 % — SIGNIFICANT CHANGE UP (ref 34.5–45)
HCV AB S/CO SERPL IA: 0.17 S/CO — SIGNIFICANT CHANGE UP (ref 0–0.79)
HCV AB SERPL-IMP: SIGNIFICANT CHANGE UP
HGB BLD-MCNC: 12.4 G/DL — SIGNIFICANT CHANGE UP (ref 11.5–15.5)
LEGIONELLA AG UR QL: NEGATIVE — SIGNIFICANT CHANGE UP
MAGNESIUM SERPL-MCNC: 2 MG/DL — SIGNIFICANT CHANGE UP (ref 1.6–2.6)
MCHC RBC-ENTMCNC: 30.9 PG — SIGNIFICANT CHANGE UP (ref 27–34)
MCHC RBC-ENTMCNC: 32.8 G/DL — SIGNIFICANT CHANGE UP (ref 32–36)
MCV RBC AUTO: 94.3 FL — SIGNIFICANT CHANGE UP (ref 80–100)
NRBC # BLD AUTO: 0 K/UL — SIGNIFICANT CHANGE UP (ref 0–0)
NRBC # FLD: 0 K/UL — SIGNIFICANT CHANGE UP (ref 0–0)
NRBC BLD AUTO-RTO: 0 /100 WBCS — SIGNIFICANT CHANGE UP (ref 0–0)
PHOSPHATE SERPL-MCNC: 3 MG/DL — SIGNIFICANT CHANGE UP (ref 2.5–4.5)
PLATELET # BLD AUTO: 202 K/UL — SIGNIFICANT CHANGE UP (ref 150–400)
POTASSIUM SERPL-MCNC: 4.1 MMOL/L — SIGNIFICANT CHANGE UP (ref 3.5–5.3)
POTASSIUM SERPL-SCNC: 4.1 MMOL/L — SIGNIFICANT CHANGE UP (ref 3.5–5.3)
RBC # BLD: 4.01 M/UL — SIGNIFICANT CHANGE UP (ref 3.8–5.2)
RBC # FLD: 12.9 % — SIGNIFICANT CHANGE UP (ref 10.3–14.5)
S PNEUM AG UR QL: NEGATIVE — SIGNIFICANT CHANGE UP
SODIUM SERPL-SCNC: 133 MMOL/L — LOW (ref 135–145)
WBC # BLD: 10.52 K/UL — HIGH (ref 3.8–10.5)
WBC # FLD AUTO: 10.52 K/UL — HIGH (ref 3.8–10.5)

## 2025-05-25 RX ADMIN — INSULIN LISPRO 3: 100 INJECTION, SOLUTION INTRAVENOUS; SUBCUTANEOUS at 12:54

## 2025-05-25 RX ADMIN — Medication 75 MICROGRAM(S): at 04:01

## 2025-05-25 RX ADMIN — INSULIN GLARGINE-YFGN 20 UNIT(S): 100 INJECTION, SOLUTION SUBCUTANEOUS at 21:34

## 2025-05-25 RX ADMIN — DEXTROMETHORPHAN HBR, GUAIFENESIN 100 MILLIGRAM(S): 200 LIQUID ORAL at 11:48

## 2025-05-25 RX ADMIN — HEPARIN SODIUM 5000 UNIT(S): 1000 INJECTION INTRAVENOUS; SUBCUTANEOUS at 17:37

## 2025-05-25 RX ADMIN — Medication 250 MILLIGRAM(S): at 05:37

## 2025-05-25 RX ADMIN — CEFTRIAXONE 100 MILLIGRAM(S): 500 INJECTION, POWDER, FOR SOLUTION INTRAMUSCULAR; INTRAVENOUS at 05:10

## 2025-05-25 RX ADMIN — CARVEDILOL 6.25 MILLIGRAM(S): 3.12 TABLET, FILM COATED ORAL at 05:10

## 2025-05-25 RX ADMIN — CYANOCOBALAMIN 1000 MICROGRAM(S): 1000 INJECTION INTRAMUSCULAR; SUBCUTANEOUS at 11:47

## 2025-05-25 RX ADMIN — DEXTROMETHORPHAN HBR, GUAIFENESIN 100 MILLIGRAM(S): 200 LIQUID ORAL at 06:19

## 2025-05-25 RX ADMIN — ATORVASTATIN CALCIUM 20 MILLIGRAM(S): 80 TABLET, FILM COATED ORAL at 21:31

## 2025-05-25 RX ADMIN — Medication 25 MILLIGRAM(S): at 21:31

## 2025-05-25 RX ADMIN — CARVEDILOL 6.25 MILLIGRAM(S): 3.12 TABLET, FILM COATED ORAL at 17:36

## 2025-05-25 RX ADMIN — Medication 25 MILLIGRAM(S): at 13:51

## 2025-05-25 RX ADMIN — HEPARIN SODIUM 5000 UNIT(S): 1000 INJECTION INTRAVENOUS; SUBCUTANEOUS at 05:10

## 2025-05-25 RX ADMIN — Medication 1 APPLICATION(S): at 11:47

## 2025-05-25 RX ADMIN — Medication 25 MILLIGRAM(S): at 05:10

## 2025-05-25 RX ADMIN — GABAPENTIN 300 MILLIGRAM(S): 400 CAPSULE ORAL at 11:47

## 2025-05-25 RX ADMIN — INSULIN LISPRO 2: 100 INJECTION, SOLUTION INTRAVENOUS; SUBCUTANEOUS at 17:37

## 2025-05-25 RX ADMIN — LOSARTAN POTASSIUM 25 MILLIGRAM(S): 100 TABLET, FILM COATED ORAL at 05:11

## 2025-05-26 LAB
ANION GAP SERPL CALC-SCNC: 16 MMOL/L — HIGH (ref 7–14)
BUN SERPL-MCNC: 54 MG/DL — HIGH (ref 7–23)
CALCIUM SERPL-MCNC: 8.3 MG/DL — LOW (ref 8.4–10.5)
CHLORIDE SERPL-SCNC: 94 MMOL/L — LOW (ref 98–107)
CO2 SERPL-SCNC: 23 MMOL/L — SIGNIFICANT CHANGE UP (ref 22–31)
CREAT SERPL-MCNC: 5.44 MG/DL — HIGH (ref 0.5–1.3)
CULTURE RESULTS: ABNORMAL
EGFR: 8 ML/MIN/1.73M2 — LOW
EGFR: 8 ML/MIN/1.73M2 — LOW
GLUCOSE BLDC GLUCOMTR-MCNC: 130 MG/DL — HIGH (ref 70–99)
GLUCOSE BLDC GLUCOMTR-MCNC: 175 MG/DL — HIGH (ref 70–99)
GLUCOSE BLDC GLUCOMTR-MCNC: 194 MG/DL — HIGH (ref 70–99)
GLUCOSE BLDC GLUCOMTR-MCNC: 223 MG/DL — HIGH (ref 70–99)
GLUCOSE SERPL-MCNC: 128 MG/DL — HIGH (ref 70–99)
HCT VFR BLD CALC: 35.1 % — SIGNIFICANT CHANGE UP (ref 34.5–45)
HGB BLD-MCNC: 11.3 G/DL — LOW (ref 11.5–15.5)
MAGNESIUM SERPL-MCNC: 2.2 MG/DL — SIGNIFICANT CHANGE UP (ref 1.6–2.6)
MCHC RBC-ENTMCNC: 30.1 PG — SIGNIFICANT CHANGE UP (ref 27–34)
MCHC RBC-ENTMCNC: 32.2 G/DL — SIGNIFICANT CHANGE UP (ref 32–36)
MCV RBC AUTO: 93.4 FL — SIGNIFICANT CHANGE UP (ref 80–100)
NRBC # BLD AUTO: 0 K/UL — SIGNIFICANT CHANGE UP (ref 0–0)
NRBC # FLD: 0 K/UL — SIGNIFICANT CHANGE UP (ref 0–0)
NRBC BLD AUTO-RTO: 0 /100 WBCS — SIGNIFICANT CHANGE UP (ref 0–0)
PHOSPHATE SERPL-MCNC: 5.3 MG/DL — HIGH (ref 2.5–4.5)
PLATELET # BLD AUTO: 192 K/UL — SIGNIFICANT CHANGE UP (ref 150–400)
POTASSIUM SERPL-MCNC: 4.2 MMOL/L — SIGNIFICANT CHANGE UP (ref 3.5–5.3)
POTASSIUM SERPL-SCNC: 4.2 MMOL/L — SIGNIFICANT CHANGE UP (ref 3.5–5.3)
RBC # BLD: 3.76 M/UL — LOW (ref 3.8–5.2)
RBC # FLD: 13 % — SIGNIFICANT CHANGE UP (ref 10.3–14.5)
SODIUM SERPL-SCNC: 133 MMOL/L — LOW (ref 135–145)
SPECIMEN SOURCE: SIGNIFICANT CHANGE UP
WBC # BLD: 9.76 K/UL — SIGNIFICANT CHANGE UP (ref 3.8–10.5)
WBC # FLD AUTO: 9.76 K/UL — SIGNIFICANT CHANGE UP (ref 3.8–10.5)

## 2025-05-26 RX ADMIN — Medication 25 MILLIGRAM(S): at 05:36

## 2025-05-26 RX ADMIN — Medication 25 MILLIGRAM(S): at 12:00

## 2025-05-26 RX ADMIN — INSULIN LISPRO 1: 100 INJECTION, SOLUTION INTRAVENOUS; SUBCUTANEOUS at 08:59

## 2025-05-26 RX ADMIN — INSULIN GLARGINE-YFGN 20 UNIT(S): 100 INJECTION, SOLUTION SUBCUTANEOUS at 21:09

## 2025-05-26 RX ADMIN — DEXTROMETHORPHAN HBR, GUAIFENESIN 100 MILLIGRAM(S): 200 LIQUID ORAL at 06:08

## 2025-05-26 RX ADMIN — HEPARIN SODIUM 5000 UNIT(S): 1000 INJECTION INTRAVENOUS; SUBCUTANEOUS at 05:36

## 2025-05-26 RX ADMIN — CYANOCOBALAMIN 1000 MICROGRAM(S): 1000 INJECTION INTRAMUSCULAR; SUBCUTANEOUS at 12:00

## 2025-05-26 RX ADMIN — CARVEDILOL 6.25 MILLIGRAM(S): 3.12 TABLET, FILM COATED ORAL at 05:36

## 2025-05-26 RX ADMIN — LOSARTAN POTASSIUM 25 MILLIGRAM(S): 100 TABLET, FILM COATED ORAL at 05:36

## 2025-05-26 RX ADMIN — Medication 25 MILLIGRAM(S): at 21:06

## 2025-05-26 RX ADMIN — ATORVASTATIN CALCIUM 20 MILLIGRAM(S): 80 TABLET, FILM COATED ORAL at 21:06

## 2025-05-26 RX ADMIN — Medication 1 APPLICATION(S): at 12:02

## 2025-05-26 RX ADMIN — CEFTRIAXONE 100 MILLIGRAM(S): 500 INJECTION, POWDER, FOR SOLUTION INTRAMUSCULAR; INTRAVENOUS at 05:35

## 2025-05-26 RX ADMIN — Medication 75 MICROGRAM(S): at 04:08

## 2025-05-26 RX ADMIN — CARVEDILOL 6.25 MILLIGRAM(S): 3.12 TABLET, FILM COATED ORAL at 18:03

## 2025-05-26 RX ADMIN — HEPARIN SODIUM 5000 UNIT(S): 1000 INJECTION INTRAVENOUS; SUBCUTANEOUS at 18:03

## 2025-05-26 RX ADMIN — INSULIN LISPRO 1: 100 INJECTION, SOLUTION INTRAVENOUS; SUBCUTANEOUS at 18:02

## 2025-05-26 RX ADMIN — GABAPENTIN 300 MILLIGRAM(S): 400 CAPSULE ORAL at 12:00

## 2025-05-26 NOTE — PHYSICAL THERAPY INITIAL EVALUATION ADULT - NSPTDISCHREC_GEN_A_CORE
Pt will not be placed on inhospital PT service 2/2 pt demonstrating independence with mobility and is at baseline function/No skilled PT needs

## 2025-05-26 NOTE — PHYSICAL THERAPY INITIAL EVALUATION ADULT - PERTINENT HX OF CURRENT PROBLEM, REHAB EVAL
68 y/o Female w/pmhx of ESRD (HD T/Th/Sat), HTN, DM2 and hypothyroidism presents to the ED for evaluation of a productive cough and shortness of breath for 3-4 days.

## 2025-05-27 ENCOUNTER — RESULT REVIEW (OUTPATIENT)
Age: 70
End: 2025-05-27

## 2025-05-27 LAB
ANION GAP SERPL CALC-SCNC: 17 MMOL/L — HIGH (ref 7–14)
BUN SERPL-MCNC: 66 MG/DL — HIGH (ref 7–23)
CALCIUM SERPL-MCNC: 8.4 MG/DL — SIGNIFICANT CHANGE UP (ref 8.4–10.5)
CHLORIDE SERPL-SCNC: 96 MMOL/L — LOW (ref 98–107)
CO2 SERPL-SCNC: 20 MMOL/L — LOW (ref 22–31)
CREAT SERPL-MCNC: 6.27 MG/DL — HIGH (ref 0.5–1.3)
EGFR: 7 ML/MIN/1.73M2 — LOW
EGFR: 7 ML/MIN/1.73M2 — LOW
GLUCOSE BLDC GLUCOMTR-MCNC: 128 MG/DL — HIGH (ref 70–99)
GLUCOSE BLDC GLUCOMTR-MCNC: 220 MG/DL — HIGH (ref 70–99)
GLUCOSE BLDC GLUCOMTR-MCNC: 241 MG/DL — HIGH (ref 70–99)
GLUCOSE BLDC GLUCOMTR-MCNC: 331 MG/DL — HIGH (ref 70–99)
GLUCOSE SERPL-MCNC: 73 MG/DL — SIGNIFICANT CHANGE UP (ref 70–99)
HBV DNA # SERPL NAA+PROBE: 36 IU/ML — SIGNIFICANT CHANGE UP
HBV DNA # SERPL NAA+PROBE: DETECTED
HBV DNA SERPL NAA+PROBE-LOG#: 1.56 LOGIU/ML — SIGNIFICANT CHANGE UP
HCT VFR BLD CALC: 35.3 % — SIGNIFICANT CHANGE UP (ref 34.5–45)
HGB BLD-MCNC: 11.6 G/DL — SIGNIFICANT CHANGE UP (ref 11.5–15.5)
MAGNESIUM SERPL-MCNC: 2.4 MG/DL — SIGNIFICANT CHANGE UP (ref 1.6–2.6)
MCHC RBC-ENTMCNC: 30.3 PG — SIGNIFICANT CHANGE UP (ref 27–34)
MCHC RBC-ENTMCNC: 32.9 G/DL — SIGNIFICANT CHANGE UP (ref 32–36)
MCV RBC AUTO: 92.2 FL — SIGNIFICANT CHANGE UP (ref 80–100)
NRBC # BLD AUTO: 0 K/UL — SIGNIFICANT CHANGE UP (ref 0–0)
NRBC # FLD: 0 K/UL — SIGNIFICANT CHANGE UP (ref 0–0)
NRBC BLD AUTO-RTO: 0 /100 WBCS — SIGNIFICANT CHANGE UP (ref 0–0)
PHOSPHATE SERPL-MCNC: 6.9 MG/DL — HIGH (ref 2.5–4.5)
PLATELET # BLD AUTO: 226 K/UL — SIGNIFICANT CHANGE UP (ref 150–400)
POTASSIUM SERPL-MCNC: 4.6 MMOL/L — SIGNIFICANT CHANGE UP (ref 3.5–5.3)
POTASSIUM SERPL-SCNC: 4.6 MMOL/L — SIGNIFICANT CHANGE UP (ref 3.5–5.3)
RBC # BLD: 3.83 M/UL — SIGNIFICANT CHANGE UP (ref 3.8–5.2)
RBC # FLD: 12.9 % — SIGNIFICANT CHANGE UP (ref 10.3–14.5)
SODIUM SERPL-SCNC: 133 MMOL/L — LOW (ref 135–145)
WBC # BLD: 8.06 K/UL — SIGNIFICANT CHANGE UP (ref 3.8–10.5)
WBC # FLD AUTO: 8.06 K/UL — SIGNIFICANT CHANGE UP (ref 3.8–10.5)

## 2025-05-27 PROCEDURE — 93356 MYOCRD STRAIN IMG SPCKL TRCK: CPT

## 2025-05-27 PROCEDURE — 93306 TTE W/DOPPLER COMPLETE: CPT | Mod: 26

## 2025-05-27 RX ORDER — LOSARTAN POTASSIUM 100 MG/1
50 TABLET, FILM COATED ORAL DAILY
Refills: 0 | Status: DISCONTINUED | OUTPATIENT
Start: 2025-05-27 | End: 2025-05-28

## 2025-05-27 RX ORDER — CEFTRIAXONE 500 MG/1
1000 INJECTION, POWDER, FOR SOLUTION INTRAMUSCULAR; INTRAVENOUS EVERY 24 HOURS
Refills: 0 | Status: DISCONTINUED | OUTPATIENT
Start: 2025-05-28 | End: 2025-05-28

## 2025-05-27 RX ADMIN — LOSARTAN POTASSIUM 25 MILLIGRAM(S): 100 TABLET, FILM COATED ORAL at 06:17

## 2025-05-27 RX ADMIN — Medication 25 MILLIGRAM(S): at 06:17

## 2025-05-27 RX ADMIN — HEPARIN SODIUM 5000 UNIT(S): 1000 INJECTION INTRAVENOUS; SUBCUTANEOUS at 05:21

## 2025-05-27 RX ADMIN — CARVEDILOL 6.25 MILLIGRAM(S): 3.12 TABLET, FILM COATED ORAL at 18:19

## 2025-05-27 RX ADMIN — CARVEDILOL 6.25 MILLIGRAM(S): 3.12 TABLET, FILM COATED ORAL at 06:17

## 2025-05-27 RX ADMIN — INSULIN GLARGINE-YFGN 20 UNIT(S): 100 INJECTION, SOLUTION SUBCUTANEOUS at 22:13

## 2025-05-27 RX ADMIN — Medication 25 MILLIGRAM(S): at 21:09

## 2025-05-27 RX ADMIN — HEPARIN SODIUM 5000 UNIT(S): 1000 INJECTION INTRAVENOUS; SUBCUTANEOUS at 18:22

## 2025-05-27 RX ADMIN — GABAPENTIN 300 MILLIGRAM(S): 400 CAPSULE ORAL at 14:46

## 2025-05-27 RX ADMIN — Medication 25 MILLIGRAM(S): at 14:46

## 2025-05-27 RX ADMIN — Medication 1 APPLICATION(S): at 14:47

## 2025-05-27 RX ADMIN — CEFTRIAXONE 100 MILLIGRAM(S): 500 INJECTION, POWDER, FOR SOLUTION INTRAMUSCULAR; INTRAVENOUS at 05:32

## 2025-05-27 RX ADMIN — CYANOCOBALAMIN 1000 MICROGRAM(S): 1000 INJECTION INTRAMUSCULAR; SUBCUTANEOUS at 18:23

## 2025-05-27 RX ADMIN — INSULIN LISPRO 2: 100 INJECTION, SOLUTION INTRAVENOUS; SUBCUTANEOUS at 09:26

## 2025-05-27 RX ADMIN — Medication 75 MICROGRAM(S): at 05:20

## 2025-05-27 RX ADMIN — ATORVASTATIN CALCIUM 20 MILLIGRAM(S): 80 TABLET, FILM COATED ORAL at 21:08

## 2025-05-27 RX ADMIN — INSULIN LISPRO 4: 100 INJECTION, SOLUTION INTRAVENOUS; SUBCUTANEOUS at 18:18

## 2025-05-28 ENCOUNTER — TRANSCRIPTION ENCOUNTER (OUTPATIENT)
Age: 70
End: 2025-05-28

## 2025-05-28 VITALS — DIASTOLIC BLOOD PRESSURE: 60 MMHG | HEART RATE: 60 BPM | SYSTOLIC BLOOD PRESSURE: 160 MMHG

## 2025-05-28 LAB
ANION GAP SERPL CALC-SCNC: 15 MMOL/L — HIGH (ref 7–14)
BUN SERPL-MCNC: 40 MG/DL — HIGH (ref 7–23)
CALCIUM SERPL-MCNC: 8.3 MG/DL — LOW (ref 8.4–10.5)
CHLORIDE SERPL-SCNC: 95 MMOL/L — LOW (ref 98–107)
CO2 SERPL-SCNC: 25 MMOL/L — SIGNIFICANT CHANGE UP (ref 22–31)
CREAT SERPL-MCNC: 4.38 MG/DL — HIGH (ref 0.5–1.3)
EGFR: 10 ML/MIN/1.73M2 — LOW
EGFR: 10 ML/MIN/1.73M2 — LOW
GLUCOSE BLDC GLUCOMTR-MCNC: 142 MG/DL — HIGH (ref 70–99)
GLUCOSE BLDC GLUCOMTR-MCNC: 154 MG/DL — HIGH (ref 70–99)
GLUCOSE SERPL-MCNC: 103 MG/DL — HIGH (ref 70–99)
HCT VFR BLD CALC: 36.3 % — SIGNIFICANT CHANGE UP (ref 34.5–45)
HGB BLD-MCNC: 11.9 G/DL — SIGNIFICANT CHANGE UP (ref 11.5–15.5)
MAGNESIUM SERPL-MCNC: 2.3 MG/DL — SIGNIFICANT CHANGE UP (ref 1.6–2.6)
MCHC RBC-ENTMCNC: 30.7 PG — SIGNIFICANT CHANGE UP (ref 27–34)
MCHC RBC-ENTMCNC: 32.8 G/DL — SIGNIFICANT CHANGE UP (ref 32–36)
MCV RBC AUTO: 93.6 FL — SIGNIFICANT CHANGE UP (ref 80–100)
NRBC # BLD AUTO: 0 K/UL — SIGNIFICANT CHANGE UP (ref 0–0)
NRBC # FLD: 0 K/UL — SIGNIFICANT CHANGE UP (ref 0–0)
NRBC BLD AUTO-RTO: 0 /100 WBCS — SIGNIFICANT CHANGE UP (ref 0–0)
PHOSPHATE SERPL-MCNC: 5.4 MG/DL — HIGH (ref 2.5–4.5)
PLATELET # BLD AUTO: 211 K/UL — SIGNIFICANT CHANGE UP (ref 150–400)
POTASSIUM SERPL-MCNC: 4.3 MMOL/L — SIGNIFICANT CHANGE UP (ref 3.5–5.3)
POTASSIUM SERPL-SCNC: 4.3 MMOL/L — SIGNIFICANT CHANGE UP (ref 3.5–5.3)
RBC # BLD: 3.88 M/UL — SIGNIFICANT CHANGE UP (ref 3.8–5.2)
RBC # FLD: 12.9 % — SIGNIFICANT CHANGE UP (ref 10.3–14.5)
SODIUM SERPL-SCNC: 135 MMOL/L — SIGNIFICANT CHANGE UP (ref 135–145)
WBC # BLD: 8.47 K/UL — SIGNIFICANT CHANGE UP (ref 3.8–10.5)
WBC # FLD AUTO: 8.47 K/UL — SIGNIFICANT CHANGE UP (ref 3.8–10.5)

## 2025-05-28 RX ORDER — LOSARTAN POTASSIUM 100 MG/1
1 TABLET, FILM COATED ORAL
Qty: 30 | Refills: 0
Start: 2025-05-28 | End: 2025-06-26

## 2025-05-28 RX ORDER — CEFUROXIME SODIUM 1.5 G
1 VIAL (EA) INJECTION
Qty: 1 | Refills: 0
Start: 2025-05-28 | End: 2025-05-28

## 2025-05-28 RX ORDER — LOSARTAN POTASSIUM 100 MG/1
1 TABLET, FILM COATED ORAL
Refills: 0 | DISCHARGE

## 2025-05-28 RX ADMIN — CYANOCOBALAMIN 1000 MICROGRAM(S): 1000 INJECTION INTRAMUSCULAR; SUBCUTANEOUS at 11:03

## 2025-05-28 RX ADMIN — Medication 25 MILLIGRAM(S): at 06:37

## 2025-05-28 RX ADMIN — HEPARIN SODIUM 5000 UNIT(S): 1000 INJECTION INTRAVENOUS; SUBCUTANEOUS at 05:24

## 2025-05-28 RX ADMIN — CEFTRIAXONE 100 MILLIGRAM(S): 500 INJECTION, POWDER, FOR SOLUTION INTRAMUSCULAR; INTRAVENOUS at 05:27

## 2025-05-28 RX ADMIN — Medication 1 APPLICATION(S): at 11:03

## 2025-05-28 RX ADMIN — LOSARTAN POTASSIUM 50 MILLIGRAM(S): 100 TABLET, FILM COATED ORAL at 06:39

## 2025-05-28 RX ADMIN — CARVEDILOL 6.25 MILLIGRAM(S): 3.12 TABLET, FILM COATED ORAL at 06:38

## 2025-05-28 RX ADMIN — Medication 25 MILLIGRAM(S): at 14:42

## 2025-05-28 RX ADMIN — INSULIN LISPRO 1: 100 INJECTION, SOLUTION INTRAVENOUS; SUBCUTANEOUS at 09:12

## 2025-05-28 RX ADMIN — GABAPENTIN 300 MILLIGRAM(S): 400 CAPSULE ORAL at 11:03

## 2025-05-28 RX ADMIN — Medication 75 MICROGRAM(S): at 05:17

## 2025-05-28 NOTE — DISCHARGE NOTE PROVIDER - NSDCFUSCHEDAPPT_GEN_ALL_CORE_FT
Kary Jordan  [facility]  [address]  Scheduled Appointment: 05/30/2025

## 2025-05-28 NOTE — DISCHARGE NOTE NURSING/CASE MANAGEMENT/SOCIAL WORK - FINANCIAL ASSISTANCE
Daily Note     Today's date: 2024  Patient name: Richard M Goldberg  : 1953  MRN: [id number]  Referring provider: Clay Glover MD  Dx:   Encounter Diagnosis     ICD-10-CM    1. Chronic bilateral low back pain without sciatica  M54.50     G89.29                      Subjective: Rich reported general stiffness, feels better after moving more and finishing elliptical       Objective: See treatment diary below      Assessment: Tolerated treatment well. Patient would benefit from continued PT. Following UPAs he reported it felt not as painful.      Plan: Continue per plan of care.      Precautions: R JABIER  anterior approach         Manuals             UPASs AF                                                   Neuro Re-Ed                                                                                                        Ther Ex             Ball adduction 30            TB abduction 30            Bike 10 min            SLR  Flex and abduction  30                                                                Ther Activity                                       Gait Training                                       Modalities                                            
[facility] provides services at a reduced cost to those who are determined to be eligible through [facility]’s financial assistance program. Information regarding [facility]’s financial assistance program can be found by going to [email or web address] or by calling [phone].

## 2025-05-28 NOTE — DISCHARGE NOTE NURSING/CASE MANAGEMENT/SOCIAL WORK - NSSCTYPOFSERV_GEN_ALL_CORE
Nurse to visit on the day following discharge.  Please contact the home care agency at the above phone number if you have not heard from them by approximately 12 noon on the day after your hospital discharge.

## 2025-05-28 NOTE — DISCHARGE NOTE PROVIDER - REASON FOR ADMISSION
cough and shortness of breath
Education not appropriate at this time, RD remains available for dietary education prn.

## 2025-05-28 NOTE — PROGRESS NOTE ADULT - REASON FOR ADMISSION
cough and shortness of breath

## 2025-05-28 NOTE — DISCHARGE NOTE PROVIDER - NSDCFUADDAPPT_GEN_ALL_CORE_FT
APPTS ARE READY TO BE MADE: [x] YES    Best Family or Patient Contact (if needed): self    Additional Information about above appointments (if needed):    1: pulm HOME  2: pcp  3:     Other comments or requests:    APPTS ARE READY TO BE MADE: [x] YES    Best Family or Patient Contact (if needed): self    Additional Information about above appointments (if needed):    1: pulm HOME  2: pcp  3:     Other comments or requests:    Pulmonary - Home Program  DR TEMITOPE STANLEYLOBO  05/30/2025 04:00 PM   PHONE CALL ONLY     PCP:  Provided patient with provider referral information, however patient prefers to schedule the appointments on their own.  Spoke with patients daughter she will schedule.

## 2025-05-28 NOTE — DISCHARGE NOTE NURSING/CASE MANAGEMENT/SOCIAL WORK - PATIENT PORTAL LINK FT
You can access the FollowMyHealth Patient Portal offered by [facility] by registering at the following website: [email or web address]. By joining [facility]’s FollowMyHealth portal, you will also be able to view your health information using other applications (apps) compatible with our system.

## 2025-05-28 NOTE — DISCHARGE NOTE PROVIDER - NSDCCPCAREPLAN_GEN_ALL_CORE_FT
PRINCIPAL DISCHARGE DIAGNOSIS  Diagnosis: PNA (pneumonia)  Assessment and Plan of Treatment: You came to the hospital with cough and shortness of breath. You had a CT chest which showed pneumonia. You had an echocardiogram which showed normal heart function. You were given IV antibiotics and you improved. Please continue to take oral antibiotics at home as directed. Repeat CT chest in three months. Follow-up with your primary care doctor within 1-2 weeks of discharge.      SECONDARY DISCHARGE DIAGNOSES  Diagnosis: HTN (hypertension)  Assessment and Plan of Treatment: Continue current blood pressure medication regimen as directed. Monitor for any visual changes, headaches or dizziness.  Monitor blood pressure regularly.  Follow up with your PCP for further management for high blood pressure, please call to make appointment within 1 week of discharge    Diagnosis: ESRD on dialysis  Assessment and Plan of Treatment: Please continue to follow your dialysis schedule and refer to your primary provider/nephrologist for further care and monitoring of kidney function and electrolytes. Continue a renal restricted diet (Avoiding foods high in potassium and phosphorus), your prescribed medications, and supplementations as directed.

## 2025-05-28 NOTE — DISCHARGE NOTE PROVIDER - CARE PROVIDER_API CALL
Elier Rasheed  Internal Medicine  [address] [place] [address]  Phone: [phone]  Fax: [phone]  Established Patient  Follow Up Time:

## 2025-05-28 NOTE — DISCHARGE NOTE NURSING/CASE MANAGEMENT/SOCIAL WORK - NSDCFUADDAPPT_GEN_ALL_CORE_FT
APPTS ARE READY TO BE MADE: [x] YES    Best Family or Patient Contact (if needed): self    Additional Information about above appointments (if needed):    1: pulm HOME  2: pcp  3:     Other comments or requests:    Pulmonary - Home Program  DR TEMITOPE STANLEYLOBO  05/30/2025 04:00 PM   PHONE CALL ONLY     PCP:  Provided patient with provider referral information, however patient prefers to schedule the appointments on their own.  Spoke with patients daughter she will schedule.

## 2025-05-28 NOTE — PROGRESS NOTE ADULT - NS ATTEND AMEND GEN_ALL_CORE FT
THINKS SHE HAS A SINUS INF, HOME COVID TEST WAS NEGATIVE, NO OPENINGS, CALL PT
HD today
BELLE tomorrow

## 2025-05-28 NOTE — PROGRESS NOTE ADULT - ASSESSMENT
69 F w/pmhx of ESRD (HD T/Th/Sat), HTN, DM2 and hypothyroidism presents to the ED for evaluation of a productive cough and shortness of breath for 3-4 days. Pt endorses associated generalized weakness and chills but no documented fevers at home.        AHRF 2/2 to suspected PNA      Problem/Plan - 1:  ·  Problem: Acute hypoxic respiratory failure.   ·  Plan: +productive cough and worsening SOB x3 days  RVP panel negative  meets sepsis criteria   cw abx - ceftx, zithro  c/w duonebs q6 PRN  Supplemental oxygen as needed.  f.u bcx - per ID will switch to po pending neg bcx x 48H  RVP neg     Problem/Plan - 2:  ·  Problem: ESRD on dialysis.   ·  Plan: Pt has a hx of ESRD on HD (T,Th, Sat)  Nephro consulted to c/w HD  Avoid nephrotoxic medications.    Problem/Plan - 3:  ·  Problem: HTN (hypertension).   ·  Plan: c/w at home carvedilol 6.25 bid, hydralazine 25 mg TID, losartan 25 QD  Continue to monitor.    Problem/Plan - 4:  ·  Problem: HLD (hyperlipidemia).   ·  Plan: c/w at home atorvastatin 20 mg QD.    Problem/Plan - 5:  ·  Problem: Hypothyroidism.   ·  Plan: c/w at home levothyroxine 75 mcg.  Problem/Plan - 6:  ·  Problem: DM2 (diabetes mellitus, type 2).   ·  Plan: Hold at home medications  c/w Lantus 20 at bedtime  c/w insulin sliding scale  A1c 6.2, FS controlled     Problem/Plan - 7:  ·  Problem: Preventive measure.   ·  Plan: VTE ppx: HSQ12  Code: Full  Diet: Reg Halal  PT eval for dispo
69-year-old female with past medical history of ESRD on HD (T/Th/Sat), hypertension, type 2 diabetes presents to emergency department for evaluation of cough and shortness of breath x 3 to 4 days.  concern for PNA    ESRD on HD via avf TTS  From [facility].  Nephrologist is Dr. Todd  last hd 5/22  - HD with 2 L UF today.  - consent in chart  - Monitor BMP     HTN   BP is acceptable  - resume home meds  - monitor BP     Anemia  Hg is at goal  - SANGEETHA on Hg < 11.    ckd mbd  check pth  monitor phos and calcium daily      
69 F PMhx ESRD (T/Th/Sat), HTN, DM II and hypothyroidism who presented w/ productive cough and SOB for about 4 days    PNA  sepsis- febrile to 100.5 on admission, leukocytosis  patient w/ productive cough of green sputum  SARS-CoV-2/ RSV/ flu PCR negative  MRSA PCR negative, legionella and strep penumoniae urine Ag negative  CXR without focal consolidation  CT chest- Patchy opacities at the lung bases compatible with pneumonia.  s/p azithromycin, completed 5/25  sputum culture- commensal amy    Recommendations  c/w ceftriaxone 1g daily  - complete 7 day course of antibiotics w/ last day 5/29 if blood cultures NGTD x 48 hours  - will plan to transition to cefuroxime 250mg daily (on HD days take after HD) to complete above course if blood cultures NGTD x 48 hours  f/u blood cultures  breathing treatments, anti-tussive    Anthony Rudd M.D.  [facility]  [phone]  After 5pm on weekdays and all day on weekends - please call [phone]  Available on microsoft teams    Thank you for consulting us and involving us in the management of this patients case. In addition to reviewing history, imaging, documents, labs, microbiology, took into account antibiotic stewardship, local antibiogram and infection control strategies and potential transmission issues at time of treatment decision making process. 
69 F PMhx ESRD (T/Th/Sat), HTN, DM II and hypothyroidism who presented w/ productive cough and SOB for about 4 days    PNA  sepsis- febrile to 100.5 on admission, leukocytosis  patient w/ productive cough of green sputum  SARS-CoV-2/ RSV/ flu PCR negative  MRSA PCR negative, legionella and strep penumoniae urine Ag negative  CXR without focal consolidation  CT chest- Patchy opacities at the lung bases compatible with pneumonia.  s/p azithromycin, completed 5/25  sputum culture- commensal amy    reports cough is much better now  Recommendations  c/w ceftriaxone 1g daily  - complete 7 day course of antibiotics w/ last day 5/29 if blood cultures NGTD x 48 hours  - plan to transition to cefuroxime 250mg daily (on HD days take after HD) to complete above course if blood cultures NGTD x 48 hours  f/u blood cultures- NGTD  supportive care, anti-tussive    Anthony Rudd M.D.  [facility]  [phone]  After 5pm on weekdays and all day on weekends - please call [phone]  Available on microsoft teams    Thank you for consulting us and involving us in the management of this patients case. In addition to reviewing history, imaging, documents, labs, microbiology, took into account antibiotic stewardship, local antibiogram and infection control strategies and potential transmission issues at time of treatment decision making process. 
69 F w/pmhx of ESRD (HD T/Th/Sat), HTN, DM2 and hypothyroidism presents to the ED for evaluation of a productive cough and shortness of breath for 3-4 days. Pt endorses associated generalized weakness and chills but no documented fevers at home.        AHRF 2/2 to suspected PNA      Problem/Plan - 1:  ·  Problem: Acute hypoxic respiratory failure.   ·  Plan: +productive cough and worsening SOB x3 days  RVP panel negative  meets sepsis criteria   cw abx - ceftx, zithro  c/w duonebs q6 PRN  Supplemental oxygen as needed.  f.u bcx  RVP neg     Problem/Plan - 2:  ·  Problem: ESRD on dialysis.   ·  Plan: Pt has a hx of ESRD on HD (T,Th, Sat)  Nephro consulted to c/w HD  Avoid nephrotoxic medications.    Problem/Plan - 3:  ·  Problem: HTN (hypertension).   ·  Plan: c/w at home carvedilol 6.25 bid, hydralazine 25 mg TID, losartan 25 QD  Continue to monitor.    Problem/Plan - 4:  ·  Problem: HLD (hyperlipidemia).   ·  Plan: c/w at home atorvastatin 20 mg QD.    Problem/Plan - 5:  ·  Problem: Hypothyroidism.   ·  Plan: c/w at home levothyroxine 75 mcg.  Problem/Plan - 6:  ·  Problem: DM2 (diabetes mellitus, type 2).   ·  Plan: Hold at home medications  c/w Lantus 20 at bedtime  c/w insulin sliding scale  A1c 6.2, FS controlled     Problem/Plan - 7:  ·  Problem: Preventive measure.   ·  Plan: VTE ppx: HSQ12  Code: Full  Diet: Reg Halal  PT eval for dispo
69 F PMhx ESRD (T/Th/Sat), HTN, DM II and hypothyroidism who presented w/ productive cough and SOB for about 4 days    PNA  sepsis- febrile to 100.5 on admission, leukocytosis  patient w/ productive cough of green sputum  SARS-CoV-2/ RSV/ flu PCR negative  MRSA PCR negative, legionella and strep penumoniae urine Ag negative  CXR without focal consolidation  CT chest- Patchy opacities at the lung bases compatible with pneumonia.  s/p azithromycin, completed 5/25  sputum culture- commensal amy  blood cultures- NGTD    Recommendations  c/w ceftriaxone 1g daily  - complete 7 day course of antibiotics w/ last day 5/29   - on discharge transition to cefuroxime 250mg daily (on HD days take after HD) to complete above course  discharge planning    discussed w/ family over the phone  Anthony Rudd M.D.  [facility]  [phone]  After 5pm on weekdays and all day on weekends - please call [phone]  Available on micro[employer] teams    Thank you for consulting us and involving us in the management of this patients case. In addition to reviewing history, imaging, documents, labs, microbiology, took into account antibiotic stewardship, local antibiogram and infection control strategies and potential transmission issues at time of treatment decision making process. 
69 F w/pmhx of ESRD (HD T/Th/Sat), HTN, DM2 and hypothyroidism presents to the ED for evaluation of a productive cough and shortness of breath for 3-4 days. Pt endorses associated generalized weakness and chills but no documented fevers at home.        AHRF 2/2 to suspected PNA      Problem/Plan - 1:  ·  Problem: Acute hypoxic respiratory failure.   ·  Plan: +productive cough and worsening SOB x3 days  RVP panel negative  cw abx   c/w duonebs q6 PRN  Supplemental oxygen as needed.    Problem/Plan - 2:  ·  Problem: ESRD on dialysis.   ·  Plan: Pt has a hx of ESRD on HD (T,Th, Sat)  Nephro consulted to c/w HD  Avoid nephrotoxic medications.    Problem/Plan - 3:  ·  Problem: HTN (hypertension).   ·  Plan: c/w at home carvedilol 6.25 bid, hydralazine 25 mg TID, losartan 25 QD  Continue to monitor.    Problem/Plan - 4:  ·  Problem: HLD (hyperlipidemia).   ·  Plan: c/w at home atorvastatin 20 mg QD.    Problem/Plan - 5:  ·  Problem: Hypothyroidism.   ·  Plan: c/w at home levothyroxine 75 mcg.  Problem/Plan - 6:  ·  Problem: DM2 (diabetes mellitus, type 2).   ·  Plan: Hold at home medications  c/w Lantus 20 at bedtime  c/w insulin sliding scale  f/u hbA1c.    Problem/Plan - 7:  ·  Problem: Preventive measure.   ·  Plan: VTE ppx: HSQ12  Code: Full  Diet: Reg Halal
69-year-old female with past medical history of ESRD on HD (T/Th/Sat), hypertension, type 2 diabetes presents to emergency department for evaluation of cough and shortness of breath x 3 to 4 days.  concern for PNA    ESRD on HD via avf TTS  From [facility].  Nephrologist is Dr. Todd  HD on 5/22 and 5/24. hd today  - HD on TTS.  - consent in chart  - Monitor BMP     HTN   BP controlled  - resume home meds  - monitor BP     Anemia  Hg is at goal  - SANGEETHA on Hg < 11.    CKD-MBD:  - check pth  - monitor phos and calcium daily      
69 F PMhx ESRD (T/Th/Sat), HTN, DM II and hypothyroidism who presented w/ productive cough and SOB for about 4 days    PNA  sepsis- febrile to 100.5 on admission, leukocytosis  patient w/ productive cough of green sputum  SARS-CoV-2/ RSV/ flu PCR negative  CXR without focal consolidation  CT chest- Patchy opacities at the lung bases compatible with pneumonia.    febrile this AM  Recommendations  c/w ceftriaxone 1g daily  c/w azithromycin 500mg daily x 3 days w/ last day today  f/u sputum culture  duonebs  blood cultures ordered    Anthony Rudd M.D.  [facility]  [phone]  After 5pm on weekdays and all day on weekends - please call [phone]  Available on [employer] teams    Thank you for consulting us and involving us in the management of this patients case. In addition to reviewing history, imaging, documents, labs, microbiology, took into account antibiotic stewardship, local antibiogram and infection control strategies and potential transmission issues at time of treatment decision making process. 
69 F w/pmhx of ESRD (HD T/Th/Sat), HTN, DM2 and hypothyroidism presents to the ED for evaluation of a productive cough and shortness of breath for 3-4 days. Pt endorses associated generalized weakness and chills but no documented fevers at home.    Problem/Plan - 1:  ·  Problem: Acute hypoxic respiratory failure.   ·  Plan: +productive cough and worsening SOB x3 days  cw abx as per ID   c/w duonebs q6 PRN  Supplemental oxygen as needed.    Problem/Plan - 2:  ·  Problem: ESRD on dialysis.   ·  Plan: Pt has a hx of ESRD on HD (T,Th, Sat)  Nephro consulted to c/w HD  Avoid nephrotoxic medications.    Problem/Plan - 3:  ·  Problem: HTN (hypertension).   ·  Plan: c/w at home carvedilol 6.25 bid, hydralazine 25 mg TID, losartan 25 QD  Continue to monitor.    Problem/Plan - 4:  ·  Problem: HLD (hyperlipidemia).   ·  Plan: c/w at home atorvastatin 20 mg QD.    Problem/Plan - 5:  ·  Problem: Hypothyroidism.   ·  Plan: c/w at home levothyroxine 75 mcg.    Problem/Plan - 6:  ·  Problem: DM2 (diabetes mellitus, type 2).   ·  Plan: Hold at home medications  c/w Lantus 20 at bedtime  c/w insulin sliding scale    Problem/Plan - 7:  ·  Problem: Preventive measure.   ·  Plan: VTE ppx: HSQ12  Code: Full  Diet: Reg Halal  PT eval for dispo  
69-year-old female with past medical history of ESRD on HD (T/Th/Sat), hypertension, type 2 diabetes presents to emergency department for evaluation of cough and shortness of breath x 3 to 4 days.  concern for PNA    ESRD on HD via avf TTS  From [facility].  Nephrologist is Dr. Todd  HD on 5/22 and 5/24.  - HD on TTS.  - consent in chart  - Monitor BMP     HTN   BP is acceptable  - resume home meds  - monitor BP     Anemia  Hg is at goal  - SANGEETHA on Hg < 11.    CKD-MBD:  - check pth  - monitor phos and calcium daily      
69-year-old female with past medical history of ESRD on HD (T/Th/Sat), hypertension, type 2 diabetes presents to emergency department for evaluation of cough and shortness of breath x 3 to 4 days.  concern for PNA    ESRD on HD via avf TTS  From [facility] [place].  Nephrologist is Dr. Todd  HD 5/27 hd tmr  - HD on TTS.  - consent in chart  - Monitor BMP     HTN   BP controlled  - resume home meds  - monitor BP     Anemia  Hg is at goal  - SANGEETHA on Hg < 11.    CKD-MBD:  - check pth  - monitor phos and calcium daily      
69-year-old female with past medical history of ESRD on HD (T/Th/Sat), hypertension, type 2 diabetes presents to emergency department for evaluation of cough and shortness of breath x 3 to 4 days.  concern for PNA    ESRD on HD via avf TTS  From [facility].  Nephrologist is Dr. Todd  last hd 5/22  - HD with 2 L UF today.  - consent in chart  - Monitor BMP     HTN   acceptable  resume home meds  monitor BP     anemia  at goal  monitor7    ckd mbd  check pth  monitor phos and calcium daily

## 2025-05-28 NOTE — DISCHARGE NOTE PROVIDER - HOSPITAL COURSE
69 F w/pmhx of ESRD (HD T/Th/Sat), HTN, DM2 and hypothyroidism presents to the ED for evaluation of a productive cough and shortness of breath for 3-4 days.    Acute hypoxic respiratory failure iso PNA  +productive cough and worsening SOB x3 days  ct chest w/lower lobe pna  legionella neg and strep negative, s/p Azithromycin 500 mg QD (5/24-5/25)  RVP panel negative  s/p Rocephin 1g daily (5/24-5/28). On discharge transition to cefuroxime 250mg daily through 05/29 to complete antibiotic course.     ESRD on dialysis.   Pt has a hx of ESRD on HD (T,Th, Sat)  Nephro consulted to c/w HD    HTN (hypertension)  c/w at home carvedilol 6.25 bid, hydralazine 25 mg TID, losartan increased to 50mg qd   Continue to monitor.    Hypoxia   acs ruled out  EKG shows sinus rhythm  BNP elevated however patient does not appear fluid overloaded on exam  Of note patient reported to have severe pulmonary HTN on Echo in 2022  TTE with normal LVSF and mild AR     DM2 (diabetes mellitus, type 2).   Hold at home medications  c/w Lantus 20 at bedtime  c/w insulin sliding scale  hbA1c 6.2%.    Patient is medically cleared for discharge on 05/28/25. Discussed with Dr. Husain.    69 F w/pmhx of ESRD (HD T/Th/Sat), HTN, DM2 and hypothyroidism presents to the ED for evaluation of a productive cough and shortness of breath for 3-4 days.    Acute hypoxic respiratory failure iso PNA  +productive cough and worsening SOB x3 days  ct chest w/lower lobe pna  legionella neg and strep negative, s/p Azithromycin 500 mg QD (5/24-5/25)  RVP panel negative  s/p Rocephin 1g daily (5/24-5/28). On discharge transition to cefuroxime 250mg daily through 05/29 to complete antibiotic course.     Hypoxia   acs ruled out  EKG shows sinus rhythm  BNP elevated however patient does not appear fluid overloaded on exam  Of note patient reported to have severe pulmonary HTN on Echo in 2022  TTE with normal LVSF and mild AR     DM2 (diabetes mellitus, type 2).   Hold at home medications  c/w Lantus 20 at bedtime  c/w insulin sliding scale  hbA1c 6.2%.     ESRD on dialysis.   Pt has a hx of ESRD on HD (T,Th, Sat)  Nephro consulted to c/w HD    HTN (hypertension)  c/w at home carvedilol 6.25 bid, hydralazine 25 mg TID, losartan increased to 50mg qd   Continue to monitor.    Patient is medically cleared for discharge on 05/28/25. Discussed with Dr. Husain.

## 2025-05-28 NOTE — DISCHARGE NOTE PROVIDER - NSDCMRMEDTOKEN_GEN_ALL_CORE_FT
albuterol 90 mcg/inh inhalation aerosol: 1 puff(s) inhaled every 6 hours as needed for  shortness of breath and/or wheezing  atorvastatin 20 mg oral tablet: 1 tab(s) orally once a day (at bedtime)  calcium acetate 667 mg oral capsule: 1 cap(s) orally 3 times a day (before meals)  carvedilol 6.25 mg oral tablet: 1 tab(s) orally 2 times a day  famotidine 20 mg oral tablet: 1 tab(s) orally once a day in the morning before breakfast  gabapentin 300 mg oral capsule: 1 cap(s) orally once a day (at bedtime)  hydrALAZINE 50 mg oral tablet: 1 to 2 tab(s) (50 mg to 100 mg) orally 3 times a day based on patient&#x27;s blood pressure  Jardiance 25 mg oral tablet: 1 tab(s) orally once a day  Lantus Solostar Pen 100 units/mL subcutaneous solution: 15 to 20 unit(s) subcutaneous once a day (at bedtime)  levothyroxine 75 mcg (0.075 mg) oral tablet: 1 tab(s) orally once a day  losartan 25 mg oral tablet: 1 tab(s) orally once a day  Melatonin 5 mg oral tablet: 1 tab(s) orally once a day (at bedtime) as needed for sleep  NovoLOG Mix 70/30 FlexPen subcutaneous suspension: 15 to 20 units subcutaneously once a day in the morning  Trulicity Pen 0.75 mg/0.5 mL subcutaneous solution: 0.75 milligram(s) subcutaneously once a week on Monday  Vitamin B12 1000 mcg oral tablet: 1 tab(s) orally once a day   albuterol 90 mcg/inh inhalation aerosol: 1 puff(s) inhaled every 6 hours as needed for  shortness of breath and/or wheezing  atorvastatin 20 mg oral tablet: 1 tab(s) orally once a day (at bedtime)  calcium acetate 667 mg oral capsule: 1 cap(s) orally 3 times a day (before meals)  carvedilol 6.25 mg oral tablet: 1 tab(s) orally 2 times a day  famotidine 20 mg oral tablet: 1 tab(s) orally once a day in the morning before breakfast  gabapentin 300 mg oral capsule: 1 cap(s) orally once a day (at bedtime)  hydrALAZINE 25 mg oral tablet: 1 tab(s) orally 3 times a day  hydrALAZINE 50 mg oral tablet: 1 to 2 tab(s) (50 mg to 100 mg) orally 3 times a day based on patient&#x27;s blood pressure  Jardiance 25 mg oral tablet: 1 tab(s) orally once a day  Lantus Solostar Pen 100 units/mL subcutaneous solution: 15 to 20 unit(s) subcutaneous once a day (at bedtime)  levothyroxine 75 mcg (0.075 mg) oral tablet: 1 tab(s) orally once a day  losartan 25 mg oral tablet: 1 tab(s) orally once a day  Melatonin 5 mg oral tablet: 1 tab(s) orally once a day (at bedtime) as needed for sleep  NovoLOG Mix 70/30 FlexPen subcutaneous suspension: 15 to 20 units subcutaneously once a day in the morning  Trulicity Pen 0.75 mg/0.5 mL subcutaneous solution: 0.75 milligram(s) subcutaneously once a week on Monday  Vitamin B12 1000 mcg oral tablet: 1 tab(s) orally once a day   albuterol 90 mcg/inh inhalation aerosol: 1 puff(s) inhaled every 6 hours as needed for  shortness of breath and/or wheezing  atorvastatin 20 mg oral tablet: 1 tab(s) orally once a day (at bedtime)  calcium acetate 667 mg oral capsule: 1 cap(s) orally 3 times a day (before meals)  carvedilol 6.25 mg oral tablet: 1 tab(s) orally 2 times a day  cefuroxime 250 mg oral tablet: 1 tab(s) orally once a day  famotidine 20 mg oral tablet: 1 tab(s) orally once a day in the morning before breakfast  gabapentin 300 mg oral capsule: 1 cap(s) orally once a day (at bedtime)  hydrALAZINE 25 mg oral tablet: 1 tab(s) orally 3 times a day  Jardiance 25 mg oral tablet: 1 tab(s) orally once a day  Lantus Solostar Pen 100 units/mL subcutaneous solution: 15 to 20 unit(s) subcutaneous once a day (at bedtime)  levothyroxine 75 mcg (0.075 mg) oral tablet: 1 tab(s) orally once a day  losartan 50 mg oral tablet: 1 tab(s) orally once a day  Melatonin 5 mg oral tablet: 1 tab(s) orally once a day (at bedtime) as needed for sleep  NovoLOG Mix 70/30 FlexPen subcutaneous suspension: 15 to 20 units subcutaneously once a day in the morning  Trulicity Pen 0.75 mg/0.5 mL subcutaneous solution: 0.75 milligram(s) subcutaneously once a week on Monday  Vitamin B12 1000 mcg oral tablet: 1 tab(s) orally once a day

## 2025-05-28 NOTE — PROGRESS NOTE ADULT - PROVIDER SPECIALTY LIST ADULT
Hospitalist
Infectious Disease
Nephrology
Nephrology
Cardiology
Infectious Disease
Internal Medicine
Cardiology
Cardiology
Hospitalist
Infectious Disease
Infectious Disease
Nephrology
Internal Medicine
Nephrology
Nephrology

## 2025-05-28 NOTE — PROGRESS NOTE ADULT - SUBJECTIVE AND OBJECTIVE BOX
Cardiovascular Disease Progress Note  Date of service: 05-26-25 @ 10:56    Overnight events: No acute events overnight.  Patient is in no distress.   Otherwise review of systems negative    Objective Findings:  T(C): 36.8 (05-26-25 @ 10:28), Max: 37 (05-26-25 @ 01:58)  HR: 65 (05-26-25 @ 10:28) (65 - 72)  BP: 122/55 (05-26-25 @ 10:28) (119/77 - 149/69)  RR: 18 (05-26-25 @ 10:28) (18 - 18)  SpO2: 98% (05-26-25 @ 10:28) (95% - 100%)  Wt(kg): --  Daily     Daily       Physical Exam:  Gen: NAD; Patient resting comfortably  HEENT: EOMI, Normocephalic/ atraumatic  CV: RRR, normal S1 + S2, no m/r/g  Lungs:  Normal respiratory effort; clear to auscultation bilaterally  Abd: soft, non-tender; bowel sounds present  Ext: No edema; warm and well perfused    Telemetry: N/a    Laboratory Data:                        11.3   9.76  )-----------( 192      ( 26 May 2025 02:53 )             35.1     05-26    133[L]  |  94[L]  |  54[H]  ----------------------------<  128[H]  4.2   |  23  |  5.44[H]    Ca    8.3[L]      26 May 2025 02:53  Phos  5.3     05-26  Mg     2.20     05-26                Inpatient Medications:  MEDICATIONS  (STANDING):  atorvastatin 20 milliGRAM(s) Oral at bedtime  carvedilol 6.25 milliGRAM(s) Oral every 12 hours  cefTRIAXone   IVPB 1000 milliGRAM(s) IV Intermittent every 24 hours  chlorhexidine 2% Cloths 1 Application(s) Topical daily  cyanocobalamin 1000 MICROGram(s) Oral daily  dextrose 5%. 1000 milliLiter(s) (100 mL/Hr) IV Continuous <Continuous>  dextrose 5%. 1000 milliLiter(s) (50 mL/Hr) IV Continuous <Continuous>  dextrose 50% Injectable 25 Gram(s) IV Push once  dextrose 50% Injectable 12.5 Gram(s) IV Push once  dextrose 50% Injectable 25 Gram(s) IV Push once  gabapentin 300 milliGRAM(s) Oral daily  glucagon  Injectable 1 milliGRAM(s) IntraMuscular once  heparin   Injectable 5000 Unit(s) SubCutaneous every 12 hours  hydrALAZINE 25 milliGRAM(s) Oral three times a day  insulin glargine Injectable (LANTUS) 20 Unit(s) SubCutaneous at bedtime  insulin lispro (ADMELOG) corrective regimen sliding scale   SubCutaneous three times a day before meals  levothyroxine 75 MICROGram(s) Oral daily  losartan 25 milliGRAM(s) Oral daily      Assessment: 69 F w/pmhx of ESRD (HD T/Th/Sat), HTN, DM2 and hypothyroidism presents to the ED for evaluation of a productive cough and shortness of breath for 3-4 days    Plan of Care:    #Hypoxia   - acs ruled out  - EKG shows sinus rhythm  - BNP elevated however patient does not appear fluid overloaded on exam  - Likely pulmonary in origin  - Of note patient reported to have severe pulmonary HTN on Echo in 2022  - NST 2022 normal perfusion  - Recommend repeat TTE  - further recommendations to follow    #Esrd  - HD as per renal    #HTN  - BP improving  - Recommend increasing Losartan to 50mg daily if SBP >150mmHg    #DM  - iSS    #Fever  - Afebrile for 24 hours.   - ID following           Over 55 minutes spent on total encounter; more than 50% of the visit was spent counseling and/or coordinating care by the attending physician.      Xander Eaton DO [facility]  Cardiovascular Disease  [phone]
Cardiovascular Disease Progress Note  Date of service: 05-27-25 @ 09:00    Overnight events: No acute events overnight.  Patient is in no distress.   Otherwise review of systems negative    Objective Findings:  T(C): 36.4 (05-27-25 @ 06:15), Max: 36.9 (05-26-25 @ 11:59)  HR: 65 (05-27-25 @ 06:15) (65 - 70)  BP: 155/58 (05-27-25 @ 06:15) (122/55 - 160/69)  RR: 16 (05-27-25 @ 06:15) (16 - 19)  SpO2: 97% (05-27-25 @ 06:15) (97% - 100%)  Wt(kg): --  Daily     Daily       Physical Exam:  Gen: NAD; Patient resting comfortably  HEENT: EOMI, Normocephalic/ atraumatic  CV: RRR, normal S1 + S2, no m/r/g  Lungs:  Normal respiratory effort; clear to auscultation bilaterally  Abd: soft, non-tender; bowel sounds present  Ext: No edema; warm and well perfused    Telemetry: N/a    Laboratory Data:                        11.6   8.06  )-----------( 226      ( 27 May 2025 05:38 )             35.3     05-27    133[L]  |  96[L]  |  66[H]  ----------------------------<  73  4.6   |  20[L]  |  6.27[H]    Ca    8.4      27 May 2025 05:38  Phos  6.9     05-27  Mg     2.40     05-27                Inpatient Medications:  MEDICATIONS  (STANDING):  atorvastatin 20 milliGRAM(s) Oral at bedtime  carvedilol 6.25 milliGRAM(s) Oral every 12 hours  chlorhexidine 2% Cloths 1 Application(s) Topical daily  cyanocobalamin 1000 MICROGram(s) Oral daily  dextrose 5%. 1000 milliLiter(s) (100 mL/Hr) IV Continuous <Continuous>  dextrose 5%. 1000 milliLiter(s) (50 mL/Hr) IV Continuous <Continuous>  dextrose 50% Injectable 25 Gram(s) IV Push once  dextrose 50% Injectable 12.5 Gram(s) IV Push once  dextrose 50% Injectable 25 Gram(s) IV Push once  gabapentin 300 milliGRAM(s) Oral daily  glucagon  Injectable 1 milliGRAM(s) IntraMuscular once  heparin   Injectable 5000 Unit(s) SubCutaneous every 12 hours  hydrALAZINE 25 milliGRAM(s) Oral three times a day  insulin glargine Injectable (LANTUS) 20 Unit(s) SubCutaneous at bedtime  insulin lispro (ADMELOG) corrective regimen sliding scale   SubCutaneous three times a day before meals  levothyroxine 75 MICROGram(s) Oral daily  losartan 25 milliGRAM(s) Oral daily      Assessment: 69 F w/pmhx of ESRD (HD T/Th/Sat), HTN, DM2 and hypothyroidism presents to the ED for evaluation of a productive cough and shortness of breath for 3-4 days    Plan of Care:    #Hypoxia   - acs ruled out  - EKG shows sinus rhythm  - BNP elevated however patient does not appear fluid overloaded on exam  - Likely pulmonary in origin  - Of note patient reported to have severe pulmonary HTN on Echo in 2022  - NST 2022 normal perfusion  - Recommend repeat TTE. Order placed. Discharge planning pending results.     #Esrd  - HD as per renal    #HTN  - BP improving  - Recommend increasing Losartan to 50mg    #DM  - iSS        Over 55 minutes spent on total encounter; more than 50% of the visit was spent counseling and/or coordinating care by the attending physician.      Xander Eaton DO [facility]  Cardiovascular Disease  [phone]
Cardiovascular Disease Progress Note  Date of service: 25 @ 12:56    Overnight events: No acute events overnight.  Patient is in no distress. Febrile overnight.   Otherwise review of systems negative    Objective Findings:  T(C): 37 (25 @ 10:01), Max: 38.1 (25 @ 02:13)  HR: 68 (25 @ 10:01) (67 - 78)  BP: 137/50 (25 @ 10:01) (125/65 - 172/63)  RR: 18 (25 @ 10:01) (18 - 20)  SpO2: 98% (25 @ 10:01) (95% - 99%)  Wt(kg): --  Daily Height in cm: 154.94 (25 May 2025 05:10)    Daily Weight in k (24 May 2025 23:15)      Physical Exam:  Gen: NAD; Patient resting comfortably  HEENT: EOMI, Normocephalic/ atraumatic  CV: RRR, normal S1 + S2, no m/r/g  Lungs:  Normal respiratory effort; clear to auscultation bilaterally  Abd: soft, non-tender; bowel sounds present  Ext: No edema; warm and well perfused    Telemetry:  N/a     Laboratory Data:                        12.4   10.52 )-----------( 202      ( 25 May 2025 06:08 )             37.8         133[L]  |  95[L]  |  28[H]  ----------------------------<  171[H]  4.1   |  24  |  4.08[H]    Ca    8.5      25 May 2025 06:08  Phos  3.0     -  Mg     2.00                     Inpatient Medications:  MEDICATIONS  (STANDING):  atorvastatin 20 milliGRAM(s) Oral at bedtime  azithromycin  IVPB 500 milliGRAM(s) IV Intermittent every 24 hours  carvedilol 6.25 milliGRAM(s) Oral every 12 hours  cefTRIAXone   IVPB 1000 milliGRAM(s) IV Intermittent every 24 hours  chlorhexidine 2% Cloths 1 Application(s) Topical daily  cyanocobalamin 1000 MICROGram(s) Oral daily  dextrose 5%. 1000 milliLiter(s) (50 mL/Hr) IV Continuous <Continuous>  dextrose 5%. 1000 milliLiter(s) (100 mL/Hr) IV Continuous <Continuous>  dextrose 50% Injectable 25 Gram(s) IV Push once  dextrose 50% Injectable 25 Gram(s) IV Push once  dextrose 50% Injectable 12.5 Gram(s) IV Push once  gabapentin 300 milliGRAM(s) Oral daily  glucagon  Injectable 1 milliGRAM(s) IntraMuscular once  heparin   Injectable 5000 Unit(s) SubCutaneous every 12 hours  hydrALAZINE 25 milliGRAM(s) Oral three times a day  insulin glargine Injectable (LANTUS) 20 Unit(s) SubCutaneous at bedtime  insulin lispro (ADMELOG) corrective regimen sliding scale   SubCutaneous three times a day before meals  levothyroxine 75 MICROGram(s) Oral daily  losartan 25 milliGRAM(s) Oral daily      Assessment:  69 F w/pmhx of ESRD (HD T//Sat), HTN, DM2 and hypothyroidism presents to the ED for evaluation of a productive cough and shortness of breath for 3-4 days    Plan of Care:    #Hypoxia   - acs ruled out  - EKG shows sinus rhythm  - BNP elevated however patient does not appear fluid overloaded on exam  - Likely pulmonary in origin  - Of note patient reported to have severe pulmonary HTN on Echo in   - NST  normal perfusion  - Recommend repeat TTE  - further recommendations to follow    #Esrd  - HD as per renal    #HTN  - BP improving  - Recommend increasing Losartan to 50mg daily if SBP >150mmHg    #DM  - iSS    #Fever  - Noted overnight  - ID following             Over 55 minutes spent on total encounter; more than 50% of the visit was spent counseling and/or coordinating care by the attending physician.      Xander Eaton DO [facility]  Cardiovascular Disease  [phone]
[facility]  LEWIS Ridley Y. Patel, S. Shah, G. Casimir  [phone]  ([phone] - weekdays after 5pm and weekends)    Name: LA TRAN  Age/Gender: 69y Female  MRN: [id number]    Interval History:  Notes reviewed.   No concerning overnight events.  Afebrile x 24 hours  feels better  reports persistent cough  [employer]  ID # [id number]    Allergies: No Known Allergies      Objective:  Vitals:   T(F): 98.3 (05-26-25 @ 05:35), Max: 98.6 (05-25-25 @ 10:01)  HR: 72 (05-26-25 @ 05:35) (68 - 72)  BP: 149/69 (05-26-25 @ 05:35) (119/77 - 149/69)  RR: 18 (05-26-25 @ 05:35) (18 - 18)  SpO2: 98% (05-26-25 @ 05:35) (95% - 100%)  Physical Examination:  General: no acute distress  HEENT: anicteric  Cardio: S1, S2, normal rate  Resp: clear to auscultation anteriorly   Abd: soft, NT, ND  Ext: no LE edema  Skin: warm, dry    Laboratory Studies:  CBC:                       11.3   9.76  )-----------( 192      ( 26 May 2025 02:53 )             35.1     WBC Trend:  9.76 05-26-25 @ 02:53  10.52 05-25-25 @ 06:08  12.34 05-23-25 @ 11:30    CMP: 05-26    133[L]  |  94[L]  |  54[H]  ----------------------------<  128[H]  4.2   |  23  |  5.44[H]    Ca    8.3[L]      26 May 2025 02:53  Phos  5.3     05-26  Mg     2.20     05-26            Urinalysis Basic - ( 26 May 2025 02:53 )    Color: x / Appearance: x / SG: x / pH: x  Gluc: 128 mg/dL / Ketone: x  / Bili: x / Urobili: x   Blood: x / Protein: x / Nitrite: x   Leuk Esterase: x / RBC: x / WBC x   Sq Epi: x / Non Sq Epi: x / Bacteria: x      Microbiology: reviewed     Culture - Sputum (collected 05-24-25 @ 17:32)  Source: Sputum Sputum  Gram Stain (05-24-25 @ 21:45):    Few Squamous epithelial cells per low power field    Few polymorphonuclear leukocytes per low power field    Few Gram Positive Rods per oil power field    Few Gram Negative Diplococci per oil power field  Preliminary Report (05-25-25 @ 15:57):    Commensal amy consistent with body site        Radiology: reviewed     Medications:  acetaminophen     Tablet .. 650 milliGRAM(s) Oral every 6 hours PRN  albuterol/ipratropium for Nebulization 3 milliLiter(s) Nebulizer every 6 hours PRN  atorvastatin 20 milliGRAM(s) Oral at bedtime  carvedilol 6.25 milliGRAM(s) Oral every 12 hours  cefTRIAXone   IVPB 1000 milliGRAM(s) IV Intermittent every 24 hours  chlorhexidine 2% Cloths 1 Application(s) Topical daily  cyanocobalamin 1000 MICROGram(s) Oral daily  dextrose 5%. 1000 milliLiter(s) IV Continuous <Continuous>  dextrose 5%. 1000 milliLiter(s) IV Continuous <Continuous>  dextrose 50% Injectable 25 Gram(s) IV Push once  dextrose 50% Injectable 12.5 Gram(s) IV Push once  dextrose 50% Injectable 25 Gram(s) IV Push once  dextrose Oral Gel 15 Gram(s) Oral once PRN  gabapentin 300 milliGRAM(s) Oral daily  glucagon  Injectable 1 milliGRAM(s) IntraMuscular once  guaiFENesin Oral Liquid (Sugar-Free) 100 milliGRAM(s) Oral every 6 hours PRN  heparin   Injectable 5000 Unit(s) SubCutaneous every 12 hours  hydrALAZINE 25 milliGRAM(s) Oral three times a day  insulin glargine Injectable (LANTUS) 20 Unit(s) SubCutaneous at bedtime  insulin lispro (ADMELOG) corrective regimen sliding scale   SubCutaneous three times a day before meals  levothyroxine 75 MICROGram(s) Oral daily  losartan 25 milliGRAM(s) Oral daily  melatonin 3 milliGRAM(s) Oral at bedtime PRN  sodium chloride 0.9% Bolus. 100 milliLiter(s) IV Bolus every 5 minutes PRN    Antimicrobials:  cefTRIAXone   IVPB 1000 milliGRAM(s) IV Intermittent every 24 hours  
[facility]  LEWIS Ridley Y. Patel, S. Shah, G. Casimir  [phone]  ([phone] - weekdays after 5pm and weekends)    Name: LA TRAN  Age/Gender: 69y Female  MRN: [id number]    Interval History:  Notes reviewed.   No concerning overnight events.  febrile to 100.6 this AM  reports she feels fine aside from cough  [employer]  ID # [id number]    Allergies: No Known Allergies      Objective:  Vitals:   T(F): 98.4 (05-25-25 @ 05:10), Max: 100.6 (05-25-25 @ 02:13)  HR: 69 (05-25-25 @ 05:10) (67 - 78)  BP: 141/59 (05-25-25 @ 05:10) (125/65 - 172/63)  RR: 18 (05-25-25 @ 05:10) (18 - 20)  SpO2: 98% (05-25-25 @ 05:10) (95% - 99%)  Physical Examination:  General: no acute distress  HEENT: anicteric  Cardio: S1, S2, normal rate  Resp: rhonchi  Abd: soft, NT, ND  Ext: no LE edema  Skin: warm, dry    Laboratory Studies:  CBC:                       12.7   12.34 )-----------( 196      ( 23 May 2025 11:30 )             39.1     WBC Trend:  12.34 05-23-25 @ 11:30    CMP: 05-23    136  |  97[L]  |  36[H]  ----------------------------<  195[H]  3.7   |  25  |  5.09[H]    Ca    8.5      23 May 2025 11:30  Phos  3.0     05-23  Mg     2.10     05-23    TPro  7.9  /  Alb  3.6  /  TBili  1.0  /  DBili  x   /  AST  34[H]  /  ALT  18  /  AlkPhos  119  05-23      LIVER FUNCTIONS - ( 23 May 2025 11:30 )  Alb: 3.6 g/dL / Pro: 7.9 g/dL / ALK PHOS: 119 U/L / ALT: 18 U/L / AST: 34 U/L / GGT: x             Urinalysis Basic - ( 23 May 2025 11:30 )    Color: x / Appearance: x / SG: x / pH: x  Gluc: 195 mg/dL / Ketone: x  / Bili: x / Urobili: x   Blood: x / Protein: x / Nitrite: x   Leuk Esterase: x / RBC: x / WBC x   Sq Epi: x / Non Sq Epi: x / Bacteria: x      Microbiology: reviewed     Culture - Sputum (collected 05-24-25 @ 17:32)  Source: Sputum Sputum  Gram Stain (05-24-25 @ 21:45):    Few Squamous epithelial cells per low power field    Few polymorphonuclear leukocytes per low power field    Few Gram Positive Rods per oil power field    Few Gram Negative Diplococci per oil power field        Radiology: reviewed     Medications:  acetaminophen     Tablet .. 650 milliGRAM(s) Oral every 6 hours PRN  albuterol/ipratropium for Nebulization 3 milliLiter(s) Nebulizer every 6 hours PRN  atorvastatin 20 milliGRAM(s) Oral at bedtime  azithromycin  IVPB 500 milliGRAM(s) IV Intermittent every 24 hours  carvedilol 6.25 milliGRAM(s) Oral every 12 hours  cefTRIAXone   IVPB 1000 milliGRAM(s) IV Intermittent every 24 hours  chlorhexidine 2% Cloths 1 Application(s) Topical daily  cyanocobalamin 1000 MICROGram(s) Oral daily  dextrose 5%. 1000 milliLiter(s) IV Continuous <Continuous>  dextrose 5%. 1000 milliLiter(s) IV Continuous <Continuous>  dextrose 50% Injectable 25 Gram(s) IV Push once  dextrose 50% Injectable 12.5 Gram(s) IV Push once  dextrose 50% Injectable 25 Gram(s) IV Push once  dextrose Oral Gel 15 Gram(s) Oral once PRN  gabapentin 300 milliGRAM(s) Oral daily  glucagon  Injectable 1 milliGRAM(s) IntraMuscular once  guaiFENesin Oral Liquid (Sugar-Free) 100 milliGRAM(s) Oral every 6 hours PRN  heparin   Injectable 5000 Unit(s) SubCutaneous every 12 hours  hydrALAZINE 25 milliGRAM(s) Oral three times a day  insulin glargine Injectable (LANTUS) 20 Unit(s) SubCutaneous at bedtime  insulin lispro (ADMELOG) corrective regimen sliding scale   SubCutaneous three times a day before meals  levothyroxine 75 MICROGram(s) Oral daily  losartan 25 milliGRAM(s) Oral daily  melatonin 3 milliGRAM(s) Oral at bedtime PRN  sodium chloride 0.9% Bolus. 100 milliLiter(s) IV Bolus every 5 minutes PRN    Antimicrobials:  azithromycin  IVPB 500 milliGRAM(s) IV Intermittent every 24 hours  cefTRIAXone   IVPB 1000 milliGRAM(s) IV Intermittent every 24 hours  
Cardiovascular Disease Progress Note  Date of service: 25 @ 08:21    Overnight events: No acute events overnight.  Patient is in no distress.   Otherwise review of systems negative    Objective Findings:  T(C): 37.1 (25 @ 06:35), Max: 37.7 (25 @ 17:58)  HR: 66 (25 @ 06:35) (63 - 72)  BP: 150/63 (25 @ 06:35) (138/54 - 171/77)  RR: 16 (25 @ 06:35) (16 - 18)  SpO2: 99% (25 @ 06:35) (96% - 100%)  Wt(kg): --  Daily     Daily Weight in k.8 (27 May 2025 14:41)      Physical Exam:  Gen: NAD; Patient resting comfortably  HEENT: EOMI, Normocephalic/ atraumatic  CV: RRR, normal S1 + S2, no m/r/g  Lungs:  Normal respiratory effort; clear to auscultation bilaterally  Abd: soft, non-tender; bowel sounds present  Ext: No edema; warm and well perfused    Telemetry: N/a     Laboratory Data:                        11.9   8.47  )-----------( 211      ( 28 May 2025 05:30 )             36.3         135  |  95[L]  |  x   ----------------------------<  x   4.3   |  x   |  x     Ca    8.4      27 May 2025 05:38  Phos  6.9       Mg     2.30                     Inpatient Medications:  MEDICATIONS  (STANDING):  atorvastatin 20 milliGRAM(s) Oral at bedtime  carvedilol 6.25 milliGRAM(s) Oral every 12 hours  cefTRIAXone   IVPB 1000 milliGRAM(s) IV Intermittent every 24 hours  chlorhexidine 2% Cloths 1 Application(s) Topical daily  cyanocobalamin 1000 MICROGram(s) Oral daily  dextrose 5%. 1000 milliLiter(s) (100 mL/Hr) IV Continuous <Continuous>  dextrose 5%. 1000 milliLiter(s) (50 mL/Hr) IV Continuous <Continuous>  dextrose 50% Injectable 25 Gram(s) IV Push once  dextrose 50% Injectable 12.5 Gram(s) IV Push once  dextrose 50% Injectable 25 Gram(s) IV Push once  gabapentin 300 milliGRAM(s) Oral daily  glucagon  Injectable 1 milliGRAM(s) IntraMuscular once  heparin   Injectable 5000 Unit(s) SubCutaneous every 12 hours  hydrALAZINE 25 milliGRAM(s) Oral three times a day  insulin glargine Injectable (LANTUS) 20 Unit(s) SubCutaneous at bedtime  insulin lispro (ADMELOG) corrective regimen sliding scale   SubCutaneous three times a day before meals  levothyroxine 75 MICROGram(s) Oral daily  losartan 50 milliGRAM(s) Oral daily      Assessment: 69 F w/pmhx of ESRD (HD T//Sat), HTN, DM2 and hypothyroidism presents to the ED for evaluation of a productive cough and shortness of breath for 3-4 days    Plan of Care:    #Hypoxia   - acs ruled out  - EKG shows sinus rhythm  - BNP elevated however patient does not appear fluid overloaded on exam  - Likely pulmonary in origin  - Of note patient reported to have severe pulmonary HTN on Echo in   - NST  normal perfusion  - TTE shows normal LV systolic function with mid AR  - No further cardiac testing planned at this time.     #Esrd  - HD as per renal    #HTN  - BP improving with recent therapeutic adjustment (Losartan increased to 50mg )  - Continue on Losartan 50mg daily upon discharge      #DM  - iSS      #ACP (advance care planning)-  Advanced care planning was discussed with the patient. Discharge planning as per primary team.  Risks, benefits and alternatives of medical treatment and procedures were discussed in detail and all questions were answered. 30 additional minutes spent addressing advance care plans.          Over 55 minutes spent on total encounter; more than 50% of the visit was spent counseling and/or coordinating care by the attending physician.      Xander Eaton,  [facility]  Cardiovascular Disease  [phone]
LA TRAN  69y  Patient is a 69y old  Female who presents with a chief complaint of cough and shortness of breath (25 May 2025 12:56)    HPI:  ESRD on HD. Last HD was yesterday.    HEALTH ISSUES - PROBLEM Dx:  Acute hypoxic respiratory failure    ESRD on dialysis    HTN (hypertension)    HLD (hyperlipidemia)    Hypothyroidism    DM2 (diabetes mellitus, type 2)    Preventive measure          MEDICATIONS  (STANDING):  atorvastatin 20 milliGRAM(s) Oral at bedtime  azithromycin  IVPB 500 milliGRAM(s) IV Intermittent every 24 hours  carvedilol 6.25 milliGRAM(s) Oral every 12 hours  cefTRIAXone   IVPB 1000 milliGRAM(s) IV Intermittent every 24 hours  chlorhexidine 2% Cloths 1 Application(s) Topical daily  cyanocobalamin 1000 MICROGram(s) Oral daily  dextrose 5%. 1000 milliLiter(s) (100 mL/Hr) IV Continuous <Continuous>  dextrose 5%. 1000 milliLiter(s) (50 mL/Hr) IV Continuous <Continuous>  dextrose 50% Injectable 25 Gram(s) IV Push once  dextrose 50% Injectable 12.5 Gram(s) IV Push once  dextrose 50% Injectable 25 Gram(s) IV Push once  gabapentin 300 milliGRAM(s) Oral daily  glucagon  Injectable 1 milliGRAM(s) IntraMuscular once  heparin   Injectable 5000 Unit(s) SubCutaneous every 12 hours  hydrALAZINE 25 milliGRAM(s) Oral three times a day  insulin glargine Injectable (LANTUS) 20 Unit(s) SubCutaneous at bedtime  insulin lispro (ADMELOG) corrective regimen sliding scale   SubCutaneous three times a day before meals  levothyroxine 75 MICROGram(s) Oral daily  losartan 25 milliGRAM(s) Oral daily    MEDICATIONS  (PRN):  acetaminophen     Tablet .. 650 milliGRAM(s) Oral every 6 hours PRN Temp greater or equal to 38C (100.4F)  albuterol/ipratropium for Nebulization 3 milliLiter(s) Nebulizer every 6 hours PRN Shortness of Breath  dextrose Oral Gel 15 Gram(s) Oral once PRN Blood Glucose LESS THAN 70 milliGRAM(s)/deciliter  guaiFENesin Oral Liquid (Sugar-Free) 100 milliGRAM(s) Oral every 6 hours PRN Cough  melatonin 3 milliGRAM(s) Oral at bedtime PRN Insomnia  sodium chloride 0.9% Bolus. 100 milliLiter(s) IV Bolus every 5 minutes PRN SBP LESS THAN or EQUAL to 80 mmHg    Vital Signs Last 24 Hrs  T(C): 36.8 (25 May 2025 13:50), Max: 38.1 (25 May 2025 02:13)  T(F): 98.2 (25 May 2025 13:50), Max: 100.6 (25 May 2025 02:13)  HR: 68 (25 May 2025 13:50) (67 - 76)  BP: 119/77 (25 May 2025 13:50) (119/77 - 172/63)  BP(mean): --  RR: 18 (25 May 2025 13:50) (18 - 19)  SpO2: 100% (25 May 2025 13:50) (95% - 100%)    Parameters below as of 25 May 2025 13:50  Patient On (Oxygen Delivery Method): room air      Daily Height in cm: 154.94 (25 May 2025 05:10)    Daily Weight in k (24 May 2025 23:15)    PHYSICAL EXAM:  Constitutional: She appears comfortable and not distressed. Not diaphoretic.    Neck:  The thyroid is normal. Trachea is midline.     Breasts: Normal examination.    Respiratory: The lungs are clear to auscultation. No dullness and expansion is normal.    Cardiovascular: S1 and S2 are normal. No mummurs, rubs or gallops are present.    Gastrointestinal: The abdomen is soft. No tenderness is present. No masses are present. Bowel sounds are normal.    Genitourinary: The bladder is not distended. No CVA tenderness is present.    Extremities: No edema is noted. No deformities are present.    Neurological: Cognition is normal. Tone, power and sensation are normal. Gait is steady.    Skin: No leasions are seen  or palpated.    Lymph Nodes: No lymphadenopathy is present.    Psychiatric: Mood is appropriate. No hallucinations or flight of ideas are noted.                              12.4   10.52 )-----------(       ( 25 May 2025 06:08 )             37.8     05-25    133[L]  |  95[L]  |  28[H]  ----------------------------<  171[H]  4.1   |  24  |  4.08[H]    Ca    8.5      25 May 2025 06:08  Phos  3.0     05-25  Mg     2.00     05-25      Urinalysis Basic - ( 25 May 2025 06:08 )    Color: x / Appearance: x / SG: x / pH: x  Gluc: 171 mg/dL / Ketone: x  / Bili: x / Urobili: x   Blood: x / Protein: x / Nitrite: x   Leuk Esterase: x / RBC: x / WBC x   Sq Epi: x / Non Sq Epi: x / Bacteria: x    
[facility] [place] [facility]   MD ANA KAPADIA MD ANGELA WONG, PA    TEL:  OFFICE: [phone]  From 5pm-7am Answering Service [phone]    -- RENAL FOLLOW UP NOTE ---Date of Service 05-28-25 @ 12:01    Patient is a 69y old  Female who presents with a chief complaint of cough and shortness of breath (28 May 2025 09:44)      Patient seen and examined at bedside. No chest pain/sob    VITALS:  T(F): 98.3 (05-28-25 @ 10:12), Max: 99.8 (05-27-25 @ 17:58)  HR: 66 (05-28-25 @ 10:12)  BP: 127/67 (05-28-25 @ 10:12)  RR: 18 (05-28-25 @ 10:12)  SpO2: 99% (05-28-25 @ 10:12)  Wt(kg): --    05-27 @ 07:01  -  05-28 @ 07:00  --------------------------------------------------------  IN: 1000 mL / OUT: 2800 mL / NET: -1800 mL    05-28 @ 07:01  -  05-28 @ 12:01  --------------------------------------------------------  IN: 150 mL / OUT: 0 mL / NET: 150 mL          PHYSICAL EXAM:  General: NAD  Neck: No JVD  Respiratory: CTAB, no wheezes, rales or rhonchi  Cardiovascular: S1, S2, RRR  Gastrointestinal: BS+, soft, NT/ND  Extremities: No peripheral edema    Hospital Medications:   MEDICATIONS  (STANDING):  atorvastatin 20 milliGRAM(s) Oral at bedtime  carvedilol 6.25 milliGRAM(s) Oral every 12 hours  cefTRIAXone   IVPB 1000 milliGRAM(s) IV Intermittent every 24 hours  chlorhexidine 2% Cloths 1 Application(s) Topical daily  cyanocobalamin 1000 MICROGram(s) Oral daily  dextrose 5%. 1000 milliLiter(s) (100 mL/Hr) IV Continuous <Continuous>  dextrose 5%. 1000 milliLiter(s) (50 mL/Hr) IV Continuous <Continuous>  dextrose 50% Injectable 25 Gram(s) IV Push once  dextrose 50% Injectable 12.5 Gram(s) IV Push once  dextrose 50% Injectable 25 Gram(s) IV Push once  gabapentin 300 milliGRAM(s) Oral daily  glucagon  Injectable 1 milliGRAM(s) IntraMuscular once  heparin   Injectable 5000 Unit(s) SubCutaneous every 12 hours  hydrALAZINE 25 milliGRAM(s) Oral three times a day  insulin glargine Injectable (LANTUS) 20 Unit(s) SubCutaneous at bedtime  insulin lispro (ADMELOG) corrective regimen sliding scale   SubCutaneous three times a day before meals  levothyroxine 75 MICROGram(s) Oral daily  losartan 50 milliGRAM(s) Oral daily      LABS:  05-28    135  |  95[L]  |  40[H]  ----------------------------<  103[H]  4.3   |  25  |  4.38[H]    Ca    8.3[L]      28 May 2025 05:30  Phos  5.4     05-28  Mg     2.30     05-28      Creatinine Trend: 4.38 <--, 6.27 <--, 5.44 <--, 4.08 <--, 5.09 <--    Phosphorus: 5.4 mg/dL (05-28 @ 05:30)                              11.9   8.47  )-----------( 211      ( 28 May 2025 05:30 )             36.3     Urine Studies:  Urinalysis - [05-28-25 @ 05:30]      Color  / Appearance  / SG  / pH       Gluc 103 / Ketone   / Bili  / Urobili        Blood  / Protein  / Leuk Est  / Nitrite       RBC  / WBC  / Hyaline  / Gran  / Sq Epi  / Non Sq Epi  / Bacteria         HBsAb <3.3      [05-24-25 @ 09:45]  HBsAg Reactive      [05-24-25 @ 09:45]  HBcAb Reactive      [05-24-25 @ 09:45]  HCV 0.17, Nonreact      [05-24-25 @ 09:45]      RADIOLOGY & ADDITIONAL STUDIES:  
Patient is a 69y old  Female who presents with a chief complaint of cough and shortness of breath (27 May 2025 11:05)    Date of servie : 05-27-25 @ 13:23  INTERVAL HPI/OVERNIGHT EVENTS:  T(C): 37.1 (05-27-25 @ 11:16), Max: 37.1 (05-27-25 @ 11:16)  HR: 70 (05-27-25 @ 11:16) (65 - 70)  BP: 167/69 (05-27-25 @ 11:16) (138/54 - 167/69)  RR: 18 (05-27-25 @ 11:16) (16 - 19)  SpO2: 96% (05-27-25 @ 10:15) (96% - 97%)  Wt(kg): --  I&O's Summary    26 May 2025 07:01  -  27 May 2025 07:00  --------------------------------------------------------  IN: 1070 mL / OUT: 0 mL / NET: 1070 mL    27 May 2025 07:01  -  27 May 2025 13:23  --------------------------------------------------------  IN: 250 mL / OUT: 0 mL / NET: 250 mL        LABS:                        11.6   8.06  )-----------( 226      ( 27 May 2025 05:38 )             35.3     05-27    133[L]  |  96[L]  |  66[H]  ----------------------------<  73  4.6   |  20[L]  |  6.27[H]    Ca    8.4      27 May 2025 05:38  Phos  6.9     05-27  Mg     2.40     05-27        Urinalysis Basic - ( 27 May 2025 05:38 )    Color: x / Appearance: x / SG: x / pH: x  Gluc: 73 mg/dL / Ketone: x  / Bili: x / Urobili: x   Blood: x / Protein: x / Nitrite: x   Leuk Esterase: x / RBC: x / WBC x   Sq Epi: x / Non Sq Epi: x / Bacteria: x      CAPILLARY BLOOD GLUCOSE      POCT Blood Glucose.: 128 mg/dL (27 May 2025 12:36)  POCT Blood Glucose.: 220 mg/dL (27 May 2025 08:58)  POCT Blood Glucose.: 223 mg/dL (26 May 2025 21:08)  POCT Blood Glucose.: 175 mg/dL (26 May 2025 17:42)        Urinalysis Basic - ( 27 May 2025 05:38 )    Color: x / Appearance: x / SG: x / pH: x  Gluc: 73 mg/dL / Ketone: x  / Bili: x / Urobili: x   Blood: x / Protein: x / Nitrite: x   Leuk Esterase: x / RBC: x / WBC x   Sq Epi: x / Non Sq Epi: x / Bacteria: x        MEDICATIONS  (STANDING):  atorvastatin 20 milliGRAM(s) Oral at bedtime  carvedilol 6.25 milliGRAM(s) Oral every 12 hours  chlorhexidine 2% Cloths 1 Application(s) Topical daily  cyanocobalamin 1000 MICROGram(s) Oral daily  dextrose 5%. 1000 milliLiter(s) (50 mL/Hr) IV Continuous <Continuous>  dextrose 5%. 1000 milliLiter(s) (100 mL/Hr) IV Continuous <Continuous>  dextrose 50% Injectable 25 Gram(s) IV Push once  dextrose 50% Injectable 12.5 Gram(s) IV Push once  dextrose 50% Injectable 25 Gram(s) IV Push once  gabapentin 300 milliGRAM(s) Oral daily  glucagon  Injectable 1 milliGRAM(s) IntraMuscular once  heparin   Injectable 5000 Unit(s) SubCutaneous every 12 hours  hydrALAZINE 25 milliGRAM(s) Oral three times a day  insulin glargine Injectable (LANTUS) 20 Unit(s) SubCutaneous at bedtime  insulin lispro (ADMELOG) corrective regimen sliding scale   SubCutaneous three times a day before meals  levothyroxine 75 MICROGram(s) Oral daily  losartan 25 milliGRAM(s) Oral daily    MEDICATIONS  (PRN):  acetaminophen     Tablet .. 650 milliGRAM(s) Oral every 6 hours PRN Temp greater or equal to 38C (100.4F)  albuterol/ipratropium for Nebulization 3 milliLiter(s) Nebulizer every 6 hours PRN Shortness of Breath  dextrose Oral Gel 15 Gram(s) Oral once PRN Blood Glucose LESS THAN 70 milliGRAM(s)/deciliter  guaiFENesin Oral Liquid (Sugar-Free) 100 milliGRAM(s) Oral every 6 hours PRN Cough  melatonin 3 milliGRAM(s) Oral at bedtime PRN Insomnia  sodium chloride 0.9% Bolus. 100 milliLiter(s) IV Bolus every 5 minutes PRN SBP LESS THAN or EQUAL to 80 mmHg          PHYSICAL EXAM:  GENERAL: NAD, well-groomed, well-developed  HEAD:  Atraumatic, Normocephalic  CHEST/LUNG: Clear to percussion bilaterally; No rales, rhonchi, wheezing, or rubs  HEART: Regular rate and rhythm; No murmurs, rubs, or gallops  ABDOMEN: Soft, Nontender, Nondistended; Bowel sounds present  EXTREMITIES:  2+ Peripheral Pulses, No clubbing, cyanosis, or edema  LYMPH: No lymphadenopathy noted  SKIN: No rashes or lesions    Care Discussed with Consultants/Other Providers [ ] YES  [ ] NO
[facility]   MD ANA KAPADIA MD ANGELA WONG, PA    TEL:  OFFICE: [phone]  From 5pm-7am Answering Service [phone]    -- RENAL FOLLOW UP NOTE ---Date of Service 05-27-25 @ 11:05    Patient is a 69y old  Female who presents with a chief complaint of cough and shortness of breath (27 May 2025 09:14)      Patient seen and examined at bedside. No chest pain/sob    VITALS:  T(F): 98.2 (05-27-25 @ 10:15), Max: 98.5 (05-26-25 @ 21:05)  HR: 69 (05-27-25 @ 10:15)  BP: 138/54 (05-27-25 @ 10:15)  RR: 18 (05-27-25 @ 10:15)  SpO2: 96% (05-27-25 @ 10:15)  Wt(kg): --    05-26 @ 07:01  -  05-27 @ 07:00  --------------------------------------------------------  IN: 1070 mL / OUT: 0 mL / NET: 1070 mL          PHYSICAL EXAM:  General: NAD  Neck: No JVD  Respiratory: CTAB, no wheezes, rales or rhonchi  Cardiovascular: S1, S2, RRR  Gastrointestinal: BS+, soft, NT/ND  Extremities: No peripheral edema    Hospital Medications:   MEDICATIONS  (STANDING):  atorvastatin 20 milliGRAM(s) Oral at bedtime  carvedilol 6.25 milliGRAM(s) Oral every 12 hours  chlorhexidine 2% Cloths 1 Application(s) Topical daily  cyanocobalamin 1000 MICROGram(s) Oral daily  dextrose 5%. 1000 milliLiter(s) (50 mL/Hr) IV Continuous <Continuous>  dextrose 5%. 1000 milliLiter(s) (100 mL/Hr) IV Continuous <Continuous>  dextrose 50% Injectable 25 Gram(s) IV Push once  dextrose 50% Injectable 12.5 Gram(s) IV Push once  dextrose 50% Injectable 25 Gram(s) IV Push once  gabapentin 300 milliGRAM(s) Oral daily  glucagon  Injectable 1 milliGRAM(s) IntraMuscular once  heparin   Injectable 5000 Unit(s) SubCutaneous every 12 hours  hydrALAZINE 25 milliGRAM(s) Oral three times a day  insulin glargine Injectable (LANTUS) 20 Unit(s) SubCutaneous at bedtime  insulin lispro (ADMELOG) corrective regimen sliding scale   SubCutaneous three times a day before meals  levothyroxine 75 MICROGram(s) Oral daily  losartan 25 milliGRAM(s) Oral daily      LABS:  05-27    133[L]  |  96[L]  |  66[H]  ----------------------------<  73  4.6   |  20[L]  |  6.27[H]    Ca    8.4      27 May 2025 05:38  Phos  6.9     05-27  Mg     2.40     05-27      Creatinine Trend: 6.27 <--, 5.44 <--, 4.08 <--, 5.09 <--    Phosphorus: 6.9 mg/dL (05-27 @ 05:38)                              11.6   8.06  )-----------( 226      ( 27 May 2025 05:38 )             35.3     Urine Studies:  Urinalysis - [05-27-25 @ 05:38]      Color  / Appearance  / SG  / pH       Gluc 73 / Ketone   / Bili  / Urobili        Blood  / Protein  / Leuk Est  / Nitrite       RBC  / WBC  / Hyaline  / Gran  / Sq Epi  / Non Sq Epi  / Bacteria         HBsAb <3.3      [05-24-25 @ 09:45]  HBsAg Reactive      [05-24-25 @ 09:45]  HBcAb Reactive      [05-24-25 @ 09:45]  HCV 0.17, Nonreact      [05-24-25 @ 09:45]      RADIOLOGY & ADDITIONAL STUDIES:  
Cardiovascular Disease Progress Note  Date of service: 05-24-25 @ 09:57    Overnight events: No acute events overnight.  Patient is in no distress.   Otherwise review of systems negative    Objective Findings:  T(C): 36.7 (05-24-25 @ 05:50), Max: 38.1 (05-23-25 @ 17:15)  HR: 87 (05-24-25 @ 05:50) (66 - 87)  BP: 155/74 (05-24-25 @ 05:50) (144/64 - 173/78)  RR: 18 (05-24-25 @ 05:50) (18 - 24)  SpO2: 99% (05-24-25 @ 05:50) (95% - 100%)  Wt(kg): --  Daily     Daily       Physical Exam:  Gen: NAD; Patient resting comfortably  HEENT: EOMI, Normocephalic/ atraumatic  CV: RRR, normal S1 + S2, no m/r/g  Lungs:  Normal respiratory effort; clear to auscultation bilaterally  Abd: soft, non-tender; bowel sounds present  Ext: No edema; warm and well perfused    Telemetry: Sinus     Laboratory Data:                        12.7   12.34 )-----------( 196      ( 23 May 2025 11:30 )             39.1     05-23    136  |  97[L]  |  36[H]  ----------------------------<  195[H]  3.7   |  25  |  5.09[H]    Ca    8.5      23 May 2025 11:30  Phos  3.0     05-23  Mg     2.10     05-23    TPro  7.9  /  Alb  3.6  /  TBili  1.0  /  DBili  x   /  AST  34[H]  /  ALT  18  /  AlkPhos  119  05-23              Inpatient Medications:  MEDICATIONS  (STANDING):  atorvastatin 20 milliGRAM(s) Oral at bedtime  azithromycin  IVPB 500 milliGRAM(s) IV Intermittent every 24 hours  carvedilol 6.25 milliGRAM(s) Oral every 12 hours  cefTRIAXone   IVPB 1000 milliGRAM(s) IV Intermittent every 24 hours  chlorhexidine 2% Cloths 1 Application(s) Topical daily  cyanocobalamin 1000 MICROGram(s) Oral daily  dextrose 5%. 1000 milliLiter(s) (100 mL/Hr) IV Continuous <Continuous>  dextrose 5%. 1000 milliLiter(s) (50 mL/Hr) IV Continuous <Continuous>  dextrose 50% Injectable 25 Gram(s) IV Push once  dextrose 50% Injectable 12.5 Gram(s) IV Push once  dextrose 50% Injectable 25 Gram(s) IV Push once  gabapentin 300 milliGRAM(s) Oral daily  glucagon  Injectable 1 milliGRAM(s) IntraMuscular once  heparin   Injectable 5000 Unit(s) SubCutaneous every 12 hours  hydrALAZINE 25 milliGRAM(s) Oral three times a day  insulin glargine Injectable (LANTUS) 20 Unit(s) SubCutaneous at bedtime  insulin lispro (ADMELOG) corrective regimen sliding scale   SubCutaneous three times a day before meals  levothyroxine 75 MICROGram(s) Oral daily  losartan 25 milliGRAM(s) Oral daily      Assessment: 69 F w/pmhx of ESRD (HD T/Th/Sat), HTN, DM2 and hypothyroidism presents to the ED for evaluation of a productive cough and shortness of breath for 3-4 days    Plan of Care:    #Hypoxia   - acs ruled out  - EKG shows sinus rhythm  - BNP elevated however patient does not appear fluid overloaded on exam  - Likely pulmonary in origin  - Of note patient reported to have severe pulmonary HTN on Echo in 2022  - NST 2022 normal perfusion  - Recommend repeat TTE  - further recommendations to follow    #Esrd  - HD as per renal    #HTN  - BP elevated  - Recommend increasing Losartan to 50mg daily    #DM  - iSS      #ACP (advance care planning)-  Advanced care planning was discussed with the patient.  Risks, benefits and alternatives of medical treatment and procedures were discussed in detail and all questions were answered. 30 additional minutes spent addressing advance care plans.      Over 55 minutes spent on total encounter; more than 50% of the visit was spent counseling and/or coordinating care by the attending physician.      Xander Eaton,  [facility]  Cardiovascular Disease  [phone]
[facility]  LEWIS Ridley Y. Patel, S. Shah, G. Casimir  [phone]  ([phone] - weekdays after 5pm and weekends)    Name: LA TRAN  Age/Gender: 69y Female  MRN: [id number]    Interval History:  Notes reviewed.   No concerning overnight events.  Afebrile.   reports cough is much better now    Allergies: No Known Allergies      Objective:  Vitals:   T(F): 97.6 (05-27-25 @ 06:15), Max: 98.5 (05-26-25 @ 21:05)  HR: 65 (05-27-25 @ 06:15) (65 - 70)  BP: 155/58 (05-27-25 @ 06:15) (122/55 - 160/69)  RR: 16 (05-27-25 @ 06:15) (16 - 19)  SpO2: 97% (05-27-25 @ 06:15) (97% - 100%)  Physical Examination:  General: no acute distress  HEENT: anicteric  Cardio: S1, S2, normal rate  Resp: clear to auscultation anteriorly   Abd: soft, NT, ND  Ext: no LE edema  Skin: warm, dry    Laboratory Studies:  CBC:                       11.6   8.06  )-----------( 226      ( 27 May 2025 05:38 )             35.3     WBC Trend:  8.06 05-27-25 @ 05:38  9.76 05-26-25 @ 02:53  10.52 05-25-25 @ 06:08  12.34 05-23-25 @ 11:30    CMP: 05-27    133[L]  |  96[L]  |  66[H]  ----------------------------<  73  4.6   |  20[L]  |  6.27[H]    Ca    8.4      27 May 2025 05:38  Phos  6.9     05-27  Mg     2.40     05-27            Urinalysis Basic - ( 27 May 2025 05:38 )    Color: x / Appearance: x / SG: x / pH: x  Gluc: 73 mg/dL / Ketone: x  / Bili: x / Urobili: x   Blood: x / Protein: x / Nitrite: x   Leuk Esterase: x / RBC: x / WBC x   Sq Epi: x / Non Sq Epi: x / Bacteria: x      Microbiology: reviewed     Culture - Blood (collected 05-25-25 @ 07:20)  Source: Blood Blood-Peripheral  Preliminary Report (05-26-25 @ 13:01):    No growth at 24 hours    Culture - Blood (collected 05-25-25 @ 07:02)  Source: Blood Blood-Peripheral  Preliminary Report (05-26-25 @ 13:01):    No growth at 24 hours    Culture - Sputum (collected 05-24-25 @ 17:32)  Source: Sputum Sputum  Gram Stain (05-24-25 @ 21:45):    Few Squamous epithelial cells per low power field    Few polymorphonuclear leukocytes per low power field    Few Gram Positive Rods per oil power field    Few Gram Negative Diplococci per oil power field  Final Report (05-26-25 @ 08:09):    Commensal amy consistent with body site        Radiology: reviewed     Medications:  acetaminophen     Tablet .. 650 milliGRAM(s) Oral every 6 hours PRN  albuterol/ipratropium for Nebulization 3 milliLiter(s) Nebulizer every 6 hours PRN  atorvastatin 20 milliGRAM(s) Oral at bedtime  carvedilol 6.25 milliGRAM(s) Oral every 12 hours  chlorhexidine 2% Cloths 1 Application(s) Topical daily  cyanocobalamin 1000 MICROGram(s) Oral daily  dextrose 5%. 1000 milliLiter(s) IV Continuous <Continuous>  dextrose 5%. 1000 milliLiter(s) IV Continuous <Continuous>  dextrose 50% Injectable 25 Gram(s) IV Push once  dextrose 50% Injectable 12.5 Gram(s) IV Push once  dextrose 50% Injectable 25 Gram(s) IV Push once  dextrose Oral Gel 15 Gram(s) Oral once PRN  gabapentin 300 milliGRAM(s) Oral daily  glucagon  Injectable 1 milliGRAM(s) IntraMuscular once  guaiFENesin Oral Liquid (Sugar-Free) 100 milliGRAM(s) Oral every 6 hours PRN  heparin   Injectable 5000 Unit(s) SubCutaneous every 12 hours  hydrALAZINE 25 milliGRAM(s) Oral three times a day  insulin glargine Injectable (LANTUS) 20 Unit(s) SubCutaneous at bedtime  insulin lispro (ADMELOG) corrective regimen sliding scale   SubCutaneous three times a day before meals  levothyroxine 75 MICROGram(s) Oral daily  losartan 25 milliGRAM(s) Oral daily  melatonin 3 milliGRAM(s) Oral at bedtime PRN  sodium chloride 0.9% Bolus. 100 milliLiter(s) IV Bolus every 5 minutes PRN    Antimicrobials:  
[facility]  LEWIS Ridley Y. Patel, S. Shah, G. Casimir  [phone]  ([phone] - weekdays after 5pm and weekends)    Name: LA TRAN  Age/Gender: 69y Female  MRN: [id number]    Interval History:  Notes reviewed.   No concerning overnight events.  Afebrile.   feels good today    Allergies: No Known Allergies      Objective:  Vitals:   T(F): 98.7 (05-28-25 @ 06:35), Max: 99.8 (05-27-25 @ 17:58)  HR: 66 (05-28-25 @ 06:35) (63 - 72)  BP: 150/63 (05-28-25 @ 06:35) (138/54 - 171/77)  RR: 16 (05-28-25 @ 06:35) (16 - 18)  SpO2: 99% (05-28-25 @ 06:35) (96% - 100%)  Physical Examination:  General: no acute distress  HEENT: anicteric  Cardio: S1, S2, normal rate  Resp: clear to auscultation anteriorly   Abd: soft, NT, ND  Ext: no LE edema  Skin: warm, dry    Laboratory Studies:  CBC:                       11.9   8.47  )-----------( 211      ( 28 May 2025 05:30 )             36.3     WBC Trend:  8.47 05-28-25 @ 05:30  8.06 05-27-25 @ 05:38  9.76 05-26-25 @ 02:53  10.52 05-25-25 @ 06:08  12.34 05-23-25 @ 11:30    CMP: 05-28    135  |  95[L]  |  40[H]  ----------------------------<  103[H]  4.3   |  25  |  4.38[H]    Ca    8.3[L]      28 May 2025 05:30  Phos  5.4     05-28  Mg     2.30     05-28            Urinalysis Basic - ( 28 May 2025 05:30 )    Color: x / Appearance: x / SG: x / pH: x  Gluc: 103 mg/dL / Ketone: x  / Bili: x / Urobili: x   Blood: x / Protein: x / Nitrite: x   Leuk Esterase: x / RBC: x / WBC x   Sq Epi: x / Non Sq Epi: x / Bacteria: x      Microbiology: reviewed     Culture - Blood (collected 05-25-25 @ 07:20)  Source: Blood Blood-Peripheral  Preliminary Report (05-27-25 @ 13:01):    No growth at 48 Hours    Culture - Blood (collected 05-25-25 @ 07:02)  Source: Blood Blood-Peripheral  Preliminary Report (05-27-25 @ 13:01):    No growth at 48 Hours    Culture - Sputum (collected 05-24-25 @ 17:32)  Source: Sputum Sputum  Gram Stain (05-24-25 @ 21:45):    Few Squamous epithelial cells per low power field    Few polymorphonuclear leukocytes per low power field    Few Gram Positive Rods per oil power field    Few Gram Negative Diplococci per oil power field  Final Report (05-26-25 @ 08:09):    Commensal amy consistent with body site        Radiology: reviewed     Medications:  acetaminophen     Tablet .. 650 milliGRAM(s) Oral every 6 hours PRN  albuterol/ipratropium for Nebulization 3 milliLiter(s) Nebulizer every 6 hours PRN  atorvastatin 20 milliGRAM(s) Oral at bedtime  carvedilol 6.25 milliGRAM(s) Oral every 12 hours  cefTRIAXone   IVPB 1000 milliGRAM(s) IV Intermittent every 24 hours  chlorhexidine 2% Cloths 1 Application(s) Topical daily  cyanocobalamin 1000 MICROGram(s) Oral daily  dextrose 5%. 1000 milliLiter(s) IV Continuous <Continuous>  dextrose 5%. 1000 milliLiter(s) IV Continuous <Continuous>  dextrose 50% Injectable 25 Gram(s) IV Push once  dextrose 50% Injectable 12.5 Gram(s) IV Push once  dextrose 50% Injectable 25 Gram(s) IV Push once  dextrose Oral Gel 15 Gram(s) Oral once PRN  gabapentin 300 milliGRAM(s) Oral daily  glucagon  Injectable 1 milliGRAM(s) IntraMuscular once  guaiFENesin Oral Liquid (Sugar-Free) 100 milliGRAM(s) Oral every 6 hours PRN  heparin   Injectable 5000 Unit(s) SubCutaneous every 12 hours  hydrALAZINE 25 milliGRAM(s) Oral three times a day  insulin glargine Injectable (LANTUS) 20 Unit(s) SubCutaneous at bedtime  insulin lispro (ADMELOG) corrective regimen sliding scale   SubCutaneous three times a day before meals  levothyroxine 75 MICROGram(s) Oral daily  losartan 50 milliGRAM(s) Oral daily  melatonin 3 milliGRAM(s) Oral at bedtime PRN  sodium chloride 0.9% Bolus. 100 milliLiter(s) IV Bolus every 5 minutes PRN    Antimicrobials:  cefTRIAXone   IVPB 1000 milliGRAM(s) IV Intermittent every 24 hours  
Patient is a 69y old  Female who presents with a chief complaint of cough and shortness of breath (24 May 2025 09:57)    Date of servie : 05-24-25 @ 13:39  INTERVAL HPI/OVERNIGHT EVENTS:  T(C): 37.2 (05-24-25 @ 09:50), Max: 38.1 (05-23-25 @ 17:15)  HR: 72 (05-24-25 @ 09:50) (66 - 87)  BP: 141/65 (05-24-25 @ 09:50) (141/65 - 173/78)  RR: 20 (05-24-25 @ 09:50) (18 - 20)  SpO2: 98% (05-24-25 @ 09:50) (95% - 100%)  Wt(kg): --  I&O's Summary    24 May 2025 07:01  -  24 May 2025 13:39  --------------------------------------------------------  IN: 200 mL / OUT: 0 mL / NET: 200 mL        LABS:                        12.7   12.34 )-----------( 196      ( 23 May 2025 11:30 )             39.1     05-23    136  |  97[L]  |  36[H]  ----------------------------<  195[H]  3.7   |  25  |  5.09[H]    Ca    8.5      23 May 2025 11:30  Phos  3.0     05-23  Mg     2.10     05-23    TPro  7.9  /  Alb  3.6  /  TBili  1.0  /  DBili  x   /  AST  34[H]  /  ALT  18  /  AlkPhos  119  05-23      Urinalysis Basic - ( 23 May 2025 11:30 )    Color: x / Appearance: x / SG: x / pH: x  Gluc: 195 mg/dL / Ketone: x  / Bili: x / Urobili: x   Blood: x / Protein: x / Nitrite: x   Leuk Esterase: x / RBC: x / WBC x   Sq Epi: x / Non Sq Epi: x / Bacteria: x      CAPILLARY BLOOD GLUCOSE      POCT Blood Glucose.: 184 mg/dL (24 May 2025 12:16)  POCT Blood Glucose.: 193 mg/dL (24 May 2025 08:48)  POCT Blood Glucose.: 207 mg/dL (23 May 2025 22:25)  POCT Blood Glucose.: 196 mg/dL (23 May 2025 18:25)  POCT Blood Glucose.: 263 mg/dL (23 May 2025 16:39)  POCT Blood Glucose.: 129 mg/dL (23 May 2025 13:47)        Urinalysis Basic - ( 23 May 2025 11:30 )    Color: x / Appearance: x / SG: x / pH: x  Gluc: 195 mg/dL / Ketone: x  / Bili: x / Urobili: x   Blood: x / Protein: x / Nitrite: x   Leuk Esterase: x / RBC: x / WBC x   Sq Epi: x / Non Sq Epi: x / Bacteria: x        MEDICATIONS  (STANDING):  atorvastatin 20 milliGRAM(s) Oral at bedtime  azithromycin  IVPB 500 milliGRAM(s) IV Intermittent every 24 hours  carvedilol 6.25 milliGRAM(s) Oral every 12 hours  cefTRIAXone   IVPB 1000 milliGRAM(s) IV Intermittent every 24 hours  chlorhexidine 2% Cloths 1 Application(s) Topical daily  cyanocobalamin 1000 MICROGram(s) Oral daily  dextrose 5%. 1000 milliLiter(s) (100 mL/Hr) IV Continuous <Continuous>  dextrose 5%. 1000 milliLiter(s) (50 mL/Hr) IV Continuous <Continuous>  dextrose 50% Injectable 25 Gram(s) IV Push once  dextrose 50% Injectable 12.5 Gram(s) IV Push once  dextrose 50% Injectable 25 Gram(s) IV Push once  gabapentin 300 milliGRAM(s) Oral daily  glucagon  Injectable 1 milliGRAM(s) IntraMuscular once  heparin   Injectable 5000 Unit(s) SubCutaneous every 12 hours  hydrALAZINE 25 milliGRAM(s) Oral three times a day  insulin glargine Injectable (LANTUS) 20 Unit(s) SubCutaneous at bedtime  insulin lispro (ADMELOG) corrective regimen sliding scale   SubCutaneous three times a day before meals  levothyroxine 75 MICROGram(s) Oral daily  losartan 25 milliGRAM(s) Oral daily    MEDICATIONS  (PRN):  acetaminophen     Tablet .. 650 milliGRAM(s) Oral every 6 hours PRN Temp greater or equal to 38C (100.4F)  albuterol/ipratropium for Nebulization 3 milliLiter(s) Nebulizer every 6 hours PRN Shortness of Breath  dextrose Oral Gel 15 Gram(s) Oral once PRN Blood Glucose LESS THAN 70 milliGRAM(s)/deciliter  melatonin 3 milliGRAM(s) Oral at bedtime PRN Insomnia  sodium chloride 0.9% Bolus. 100 milliLiter(s) IV Bolus every 5 minutes PRN SBP LESS THAN or EQUAL to 80 mmHg          PHYSICAL EXAM:  GENERAL: NAD, well-groomed, well-developed  HEAD:  Atraumatic, Normocephalic  CHEST/LUNG: Clear to percussion bilaterally; No rales, rhonchi, wheezing, or rubs  HEART: Regular rate and rhythm; No murmurs, rubs, or gallops  ABDOMEN: Soft, Nontender, Nondistended; Bowel sounds present  EXTREMITIES:  2+ Peripheral Pulses, No clubbing, cyanosis, or edema  LYMPH: No lymphadenopathy noted  SKIN: No rashes or lesions    Care Discussed with Consultants/Other Providers [ ] YES  [ ] NO
Patient is a 69y old  Female who presents with a chief complaint of cough and shortness of breath (26 May 2025 06:48)      SUBJECTIVE / OVERNIGHT EVENTS: no events or complaints - afebrile     ROS:  all neg unless mentioned in hpi     MEDICATIONS  (STANDING):  atorvastatin 20 milliGRAM(s) Oral at bedtime  carvedilol 6.25 milliGRAM(s) Oral every 12 hours  cefTRIAXone   IVPB 1000 milliGRAM(s) IV Intermittent every 24 hours  chlorhexidine 2% Cloths 1 Application(s) Topical daily  cyanocobalamin 1000 MICROGram(s) Oral daily  dextrose 5%. 1000 milliLiter(s) (100 mL/Hr) IV Continuous <Continuous>  dextrose 5%. 1000 milliLiter(s) (50 mL/Hr) IV Continuous <Continuous>  dextrose 50% Injectable 25 Gram(s) IV Push once  dextrose 50% Injectable 12.5 Gram(s) IV Push once  dextrose 50% Injectable 25 Gram(s) IV Push once  gabapentin 300 milliGRAM(s) Oral daily  glucagon  Injectable 1 milliGRAM(s) IntraMuscular once  heparin   Injectable 5000 Unit(s) SubCutaneous every 12 hours  hydrALAZINE 25 milliGRAM(s) Oral three times a day  insulin glargine Injectable (LANTUS) 20 Unit(s) SubCutaneous at bedtime  insulin lispro (ADMELOG) corrective regimen sliding scale   SubCutaneous three times a day before meals  levothyroxine 75 MICROGram(s) Oral daily  losartan 25 milliGRAM(s) Oral daily    MEDICATIONS  (PRN):  acetaminophen     Tablet .. 650 milliGRAM(s) Oral every 6 hours PRN Temp greater or equal to 38C (100.4F)  albuterol/ipratropium for Nebulization 3 milliLiter(s) Nebulizer every 6 hours PRN Shortness of Breath  dextrose Oral Gel 15 Gram(s) Oral once PRN Blood Glucose LESS THAN 70 milliGRAM(s)/deciliter  guaiFENesin Oral Liquid (Sugar-Free) 100 milliGRAM(s) Oral every 6 hours PRN Cough  melatonin 3 milliGRAM(s) Oral at bedtime PRN Insomnia  sodium chloride 0.9% Bolus. 100 milliLiter(s) IV Bolus every 5 minutes PRN SBP LESS THAN or EQUAL to 80 mmHg      T(C): 36.8 (05-26-25 @ 05:35)  HR: 72 (05-26-25 @ 05:35)  BP: 149/69 (05-26-25 @ 05:35)  RR: 18 (05-26-25 @ 05:35)  SpO2: 98% (05-26-25 @ 05:35)  CAPILLARY BLOOD GLUCOSE      POCT Blood Glucose.: 151 mg/dL (25 May 2025 21:14)  POCT Blood Glucose.: 223 mg/dL (25 May 2025 17:26)  POCT Blood Glucose.: 293 mg/dL (25 May 2025 12:23)  POCT Blood Glucose.: 134 mg/dL (25 May 2025 08:49)    I&O's Summary    25 May 2025 07:01  -  26 May 2025 07:00  --------------------------------------------------------  IN: 530 mL / OUT: 0 mL / NET: 530 mL        PHYSICAL EXAM:  GENERAL: NAD  NECK: Supple, No JVD  CHEST/LUNG: Clear to auscultation bilaterally  HEART: Regular rate and rhythm; No murmurs, rubs, or gallops, No Edema  ABDOMEN: Soft, Nontender, Nondistended; Bowel sounds present  EXTREMITIES:  2+ Peripheral Pulses, No clubbing, cyanosis      LABS:                        11.3   9.76  )-----------( 192      ( 26 May 2025 02:53 )             35.1     05-26    133[L]  |  94[L]  |  54[H]  ----------------------------<  128[H]  4.2   |  23  |  5.44[H]    Ca    8.3[L]      26 May 2025 02:53  Phos  5.3     05-26  Mg     2.20     05-26            Urinalysis Basic - ( 26 May 2025 02:53 )    Color: x / Appearance: x / SG: x / pH: x  Gluc: 128 mg/dL / Ketone: x  / Bili: x / Urobili: x   Blood: x / Protein: x / Nitrite: x   Leuk Esterase: x / RBC: x / WBC x   Sq Epi: x / Non Sq Epi: x / Bacteria: x            RADIOLOGY & ADDITIONAL TESTS:    Imaging Personally Reviewed:    Consultant(s) Notes Reviewed:      Care Discussed with Consultants/Other Providers:  
Patient is a 69y old  Female who presents with a chief complaint of cough and shortness of breath (25 May 2025 06:49)      SUBJECTIVE / OVERNIGHT EVENTS: febrile 100.6 - sats ok, still w cough     ROS:  all neg unless mentioned in hpi     MEDICATIONS  (STANDING):  atorvastatin 20 milliGRAM(s) Oral at bedtime  azithromycin  IVPB 500 milliGRAM(s) IV Intermittent every 24 hours  carvedilol 6.25 milliGRAM(s) Oral every 12 hours  cefTRIAXone   IVPB 1000 milliGRAM(s) IV Intermittent every 24 hours  chlorhexidine 2% Cloths 1 Application(s) Topical daily  cyanocobalamin 1000 MICROGram(s) Oral daily  dextrose 5%. 1000 milliLiter(s) (100 mL/Hr) IV Continuous <Continuous>  dextrose 5%. 1000 milliLiter(s) (50 mL/Hr) IV Continuous <Continuous>  dextrose 50% Injectable 25 Gram(s) IV Push once  dextrose 50% Injectable 12.5 Gram(s) IV Push once  dextrose 50% Injectable 25 Gram(s) IV Push once  gabapentin 300 milliGRAM(s) Oral daily  glucagon  Injectable 1 milliGRAM(s) IntraMuscular once  heparin   Injectable 5000 Unit(s) SubCutaneous every 12 hours  hydrALAZINE 25 milliGRAM(s) Oral three times a day  insulin glargine Injectable (LANTUS) 20 Unit(s) SubCutaneous at bedtime  insulin lispro (ADMELOG) corrective regimen sliding scale   SubCutaneous three times a day before meals  levothyroxine 75 MICROGram(s) Oral daily  losartan 25 milliGRAM(s) Oral daily    MEDICATIONS  (PRN):  acetaminophen     Tablet .. 650 milliGRAM(s) Oral every 6 hours PRN Temp greater or equal to 38C (100.4F)  albuterol/ipratropium for Nebulization 3 milliLiter(s) Nebulizer every 6 hours PRN Shortness of Breath  dextrose Oral Gel 15 Gram(s) Oral once PRN Blood Glucose LESS THAN 70 milliGRAM(s)/deciliter  guaiFENesin Oral Liquid (Sugar-Free) 100 milliGRAM(s) Oral every 6 hours PRN Cough  melatonin 3 milliGRAM(s) Oral at bedtime PRN Insomnia  sodium chloride 0.9% Bolus. 100 milliLiter(s) IV Bolus every 5 minutes PRN SBP LESS THAN or EQUAL to 80 mmHg      T(C): 36.9 (05-25-25 @ 05:10)  HR: 69 (05-25-25 @ 05:10)  BP: 141/59 (05-25-25 @ 05:10)  RR: 18 (05-25-25 @ 05:10)  SpO2: 98% (05-25-25 @ 05:10)  CAPILLARY BLOOD GLUCOSE      POCT Blood Glucose.: 134 mg/dL (24 May 2025 23:48)  POCT Blood Glucose.: 204 mg/dL (24 May 2025 18:09)  POCT Blood Glucose.: 184 mg/dL (24 May 2025 12:16)  POCT Blood Glucose.: 193 mg/dL (24 May 2025 08:48)    I&O's Summary    24 May 2025 07:01  -  25 May 2025 07:00  --------------------------------------------------------  IN: 1980 mL / OUT: 3300 mL / NET: -1320 mL        PHYSICAL EXAM:  GENERAL: NAD  NECK: Supple, No JVD  CHEST/LUNG: basilar rhonchi  HEART: Regular rate and rhythm; No murmurs, rubs, or gallops, No Edema  ABDOMEN: Soft, Nontender, Nondistended; Bowel sounds present  EXTREMITIES:  2+ Peripheral Pulses, No clubbing, cyanosis      LABS:                        12.4   10.52 )-----------( 202      ( 25 May 2025 06:08 )             37.8     05-25    133[L]  |  95[L]  |  28[H]  ----------------------------<  171[H]  4.1   |  24  |  4.08[H]    Ca    8.5      25 May 2025 06:08  Phos  3.0     05-25  Mg     2.00     05-25    TPro  7.9  /  Alb  3.6  /  TBili  1.0  /  DBili  x   /  AST  34[H]  /  ALT  18  /  AlkPhos  119  05-23          Urinalysis Basic - ( 25 May 2025 06:08 )    Color: x / Appearance: x / SG: x / pH: x  Gluc: 171 mg/dL / Ketone: x  / Bili: x / Urobili: x   Blood: x / Protein: x / Nitrite: x   Leuk Esterase: x / RBC: x / WBC x   Sq Epi: x / Non Sq Epi: x / Bacteria: x            RADIOLOGY & ADDITIONAL TESTS:    Imaging Personally Reviewed:    Consultant(s) Notes Reviewed:      Care Discussed with Consultants/Other Providers:  
LA TRAN  69y  Patient is a 69y old  Female who presents with a chief complaint of cough and shortness of breath (26 May 2025 10:56)    HPI:  ESRD on HD, last HD on Saturday. No new complaints at this time.      HEALTH ISSUES - PROBLEM Dx:  Acute hypoxic respiratory failure    ESRD on dialysis    HTN (hypertension)    HLD (hyperlipidemia)    Hypothyroidism    DM2 (diabetes mellitus, type 2)    Preventive measure          MEDICATIONS  (STANDING):  atorvastatin 20 milliGRAM(s) Oral at bedtime  carvedilol 6.25 milliGRAM(s) Oral every 12 hours  cefTRIAXone   IVPB 1000 milliGRAM(s) IV Intermittent every 24 hours  chlorhexidine 2% Cloths 1 Application(s) Topical daily  cyanocobalamin 1000 MICROGram(s) Oral daily  dextrose 5%. 1000 milliLiter(s) (100 mL/Hr) IV Continuous <Continuous>  dextrose 5%. 1000 milliLiter(s) (50 mL/Hr) IV Continuous <Continuous>  dextrose 50% Injectable 25 Gram(s) IV Push once  dextrose 50% Injectable 12.5 Gram(s) IV Push once  dextrose 50% Injectable 25 Gram(s) IV Push once  gabapentin 300 milliGRAM(s) Oral daily  glucagon  Injectable 1 milliGRAM(s) IntraMuscular once  heparin   Injectable 5000 Unit(s) SubCutaneous every 12 hours  hydrALAZINE 25 milliGRAM(s) Oral three times a day  insulin glargine Injectable (LANTUS) 20 Unit(s) SubCutaneous at bedtime  insulin lispro (ADMELOG) corrective regimen sliding scale   SubCutaneous three times a day before meals  levothyroxine 75 MICROGram(s) Oral daily  losartan 25 milliGRAM(s) Oral daily    MEDICATIONS  (PRN):  acetaminophen     Tablet .. 650 milliGRAM(s) Oral every 6 hours PRN Temp greater or equal to 38C (100.4F)  albuterol/ipratropium for Nebulization 3 milliLiter(s) Nebulizer every 6 hours PRN Shortness of Breath  dextrose Oral Gel 15 Gram(s) Oral once PRN Blood Glucose LESS THAN 70 milliGRAM(s)/deciliter  guaiFENesin Oral Liquid (Sugar-Free) 100 milliGRAM(s) Oral every 6 hours PRN Cough  melatonin 3 milliGRAM(s) Oral at bedtime PRN Insomnia  sodium chloride 0.9% Bolus. 100 milliLiter(s) IV Bolus every 5 minutes PRN SBP LESS THAN or EQUAL to 80 mmHg    Vital Signs Last 24 Hrs  T(C): 36.9 (26 May 2025 11:59), Max: 37 (26 May 2025 01:58)  T(F): 98.4 (26 May 2025 11:59), Max: 98.6 (26 May 2025 01:58)  HR: 70 (26 May 2025 11:59) (65 - 72)  BP: 160/69 (26 May 2025 11:59) (122/55 - 160/69)  BP(mean): --  RR: 18 (26 May 2025 11:59) (18 - 18)  SpO2: 100% (26 May 2025 11:59) (95% - 100%)    Parameters below as of 26 May 2025 11:59  Patient On (Oxygen Delivery Method): room air      Daily     Daily     PHYSICAL EXAM:  Constitutional:  She appears comfortable and not distressed. Not diaphoretic.    Neck:  The thyroid is normal. Trachea is midline.     Breasts: Normal examination.    Respiratory: The lungs are clear to auscultation. No dullness and expansion is normal.    Cardiovascular: S1 and S2 are normal. No mummurs, rubs or gallops are present.    Gastrointestinal: The abdomen is soft. No tenderness is present. No masses are present. Bowel sounds are normal.    Genitourinary: The bladder is not distended. No CVA tenderness is present.    Extremities: No edema is noted. No deformities are present.    Neurological: Cognition is normal. Tone, power and sensation are normal. Gait is steady.    Skin: No leasions are seen  or palpated.    Lymph Nodes: No lymphadenopathy is present.    Psychiatric: Mood is appropriate. No hallucinations or flight of ideas are noted.                              11.3   9.76  )-----------( 192      ( 26 May 2025 02:53 )             35.1     05-26    133[L]  |  94[L]  |  54[H]  ----------------------------<  128[H]  4.2   |  23  |  5.44[H]    Ca    8.3[L]      26 May 2025 02:53  Phos  5.3     05-26  Mg     2.20     05-26      
LA TRAN  69y  Patient is a 69y old  Female who presents with a chief complaint of cough and shortness of breath (24 May 2025 13:38)    HPI:  ESRD on HD, admitted for Respiratory difficulty.    HEALTH ISSUES - PROBLEM Dx:  Acute hypoxic respiratory failure    ESRD on dialysis    HTN (hypertension)    HLD (hyperlipidemia)    Hypothyroidism    DM2 (diabetes mellitus, type 2)    Preventive measure          MEDICATIONS  (STANDING):  atorvastatin 20 milliGRAM(s) Oral at bedtime  azithromycin  IVPB 500 milliGRAM(s) IV Intermittent every 24 hours  carvedilol 6.25 milliGRAM(s) Oral every 12 hours  cefTRIAXone   IVPB 1000 milliGRAM(s) IV Intermittent every 24 hours  chlorhexidine 2% Cloths 1 Application(s) Topical daily  cyanocobalamin 1000 MICROGram(s) Oral daily  dextrose 5%. 1000 milliLiter(s) (100 mL/Hr) IV Continuous <Continuous>  dextrose 5%. 1000 milliLiter(s) (50 mL/Hr) IV Continuous <Continuous>  dextrose 50% Injectable 25 Gram(s) IV Push once  dextrose 50% Injectable 12.5 Gram(s) IV Push once  dextrose 50% Injectable 25 Gram(s) IV Push once  gabapentin 300 milliGRAM(s) Oral daily  glucagon  Injectable 1 milliGRAM(s) IntraMuscular once  heparin   Injectable 5000 Unit(s) SubCutaneous every 12 hours  hydrALAZINE 25 milliGRAM(s) Oral three times a day  insulin glargine Injectable (LANTUS) 20 Unit(s) SubCutaneous at bedtime  insulin lispro (ADMELOG) corrective regimen sliding scale   SubCutaneous three times a day before meals  levothyroxine 75 MICROGram(s) Oral daily  losartan 25 milliGRAM(s) Oral daily    MEDICATIONS  (PRN):  acetaminophen     Tablet .. 650 milliGRAM(s) Oral every 6 hours PRN Temp greater or equal to 38C (100.4F)  albuterol/ipratropium for Nebulization 3 milliLiter(s) Nebulizer every 6 hours PRN Shortness of Breath  dextrose Oral Gel 15 Gram(s) Oral once PRN Blood Glucose LESS THAN 70 milliGRAM(s)/deciliter  melatonin 3 milliGRAM(s) Oral at bedtime PRN Insomnia  sodium chloride 0.9% Bolus. 100 milliLiter(s) IV Bolus every 5 minutes PRN SBP LESS THAN or EQUAL to 80 mmHg    Vital Signs Last 24 Hrs  T(C): 36.3 (24 May 2025 13:05), Max: 38.1 (23 May 2025 17:15)  T(F): 97.3 (24 May 2025 13:05), Max: 100.5 (23 May 2025 17:15)  HR: 78 (24 May 2025 13:05) (66 - 87)  BP: 156/74 (24 May 2025 13:05) (141/65 - 173/78)  BP(mean): --  RR: 20 (24 May 2025 13:05) (18 - 20)  SpO2: 99% (24 May 2025 13:05) (95% - 100%)    Parameters below as of 24 May 2025 13:05  Patient On (Oxygen Delivery Method): room air      Daily     Daily     PHYSICAL EXAM:  Constitutional:   appears comfortable and not distressed. Not diaphoretic.    Neck:  The thyroid is normal. Trachea is midline.     Breasts: Normal examination.    Respiratory: The lungs are clear to auscultation. No dullness and expansion is normal.    Cardiovascular: S1 and S2 are normal. No mummurs, rubs or gallops are present.    Gastrointestinal: The abdomen is soft. No tenderness is present. No masses are present. Bowel sounds are normal.    Genitourinary: The bladder is not distended. No CVA tenderness is present.    Extremities: No edema is noted. No deformities are present.    Neurological: Cognition is normal. Tone, power and sensation are normal. Gait is steady.    Skin: No leasions are seen  or palpated.    Lymph Nodes: No lymphadenopathy is present.    Psychiatric: Mood is appropriate. No hallucinations or flight of ideas are noted.                              12.7   12.34 )-----------( 196      ( 23 May 2025 11:30 )             39.1     05-23    136  |  97[L]  |  36[H]  ----------------------------<  195[H]  3.7   |  25  |  5.09[H]    Ca    8.5      23 May 2025 11:30  Phos  3.0     05-23  Mg     2.10     05-23    TPro  7.9  /  Alb  3.6  /  TBili  1.0  /  DBili  x   /  AST  34[H]  /  ALT  18  /  AlkPhos  119  05-23

## 2025-05-30 ENCOUNTER — APPOINTMENT (OUTPATIENT)
Dept: PULMONOLOGY | Facility: CLINIC | Age: 70
End: 2025-05-30
Payer: MEDICARE

## 2025-05-30 DIAGNOSIS — J96.01 ACUTE RESPIRATORY FAILURE WITH HYPOXIA: ICD-10-CM

## 2025-05-30 DIAGNOSIS — J45.909 UNSPECIFIED ASTHMA, UNCOMPLICATED: ICD-10-CM

## 2025-05-30 DIAGNOSIS — J18.9 PNEUMONIA, UNSPECIFIED ORGANISM: ICD-10-CM

## 2025-05-30 LAB
CULTURE RESULTS: SIGNIFICANT CHANGE UP
CULTURE RESULTS: SIGNIFICANT CHANGE UP
SPECIMEN SOURCE: SIGNIFICANT CHANGE UP
SPECIMEN SOURCE: SIGNIFICANT CHANGE UP

## 2025-05-30 PROCEDURE — 99496 TRANSJ CARE MGMT HIGH F2F 7D: CPT | Mod: 2W

## (undated) DEVICE — SYR LUER LOK 10CC

## (undated) DEVICE — VENODYNE/SCD SLEEVE CALF MEDIUM

## (undated) DEVICE — WARMING BLANKET LOWER ADULT

## (undated) DEVICE — SOL BAG NS 0.9% 1000ML

## (undated) DEVICE — SUT VICRYL 3-0 27" SH UNDYED

## (undated) DEVICE — CLAMP BULLDOG MIDI 45 DEGREE (GREEN) DISP

## (undated) DEVICE — SUT PROLENE 6-0 24" BV-1

## (undated) DEVICE — SUT SILK 4-0 17-18"

## (undated) DEVICE — DRSG CURITY GAUZE SPONGE 4 X 4" 12-PLY

## (undated) DEVICE — DRAPE HAND 77" X 146"

## (undated) DEVICE — BAG DECANTER DISP

## (undated) DEVICE — SUT MONOCRYL 4-0 27" PS-2 UNDYED

## (undated) DEVICE — PACK AV FISTULA

## (undated) DEVICE — GEL AQUSNC PACKET 20GR

## (undated) DEVICE — SYNOVIS VASCULAR PROBE 1.5MM 15CM

## (undated) DEVICE — SUT PROLENE 7-0 24" BV-1

## (undated) DEVICE — NDL HYPO SAFE 25G X 5/8" (ORANGE)

## (undated) DEVICE — DRAPE TOWEL BLUE 17" X 24"

## (undated) DEVICE — PREP CHLORAPREP HI-LITE ORANGE 26ML

## (undated) DEVICE — DURABLE MEDICAL EQUIPMENT: Type: DURABLE MEDICAL EQUIPMENT

## (undated) DEVICE — ELCTR GROUNDING PAD ADULT COVIDIEN

## (undated) DEVICE — DRAPE 3/4 SHEET 52X76"

## (undated) DEVICE — SOL IRR BAG NS 0.9% 1000ML

## (undated) DEVICE — DRSG TEGADERM 2.5X3"

## (undated) DEVICE — SUT SILK 2-0 18" TIES

## (undated) DEVICE — VESSEL LOOP MINI-BLUE 0.075" X 16"

## (undated) DEVICE — SUT SILK 3-0 18" TIES

## (undated) DEVICE — POSITIONER STRAP ARMBOARD VELCRO TS-30